# Patient Record
Sex: FEMALE | Race: WHITE | NOT HISPANIC OR LATINO | Employment: OTHER | URBAN - NONMETROPOLITAN AREA
[De-identification: names, ages, dates, MRNs, and addresses within clinical notes are randomized per-mention and may not be internally consistent; named-entity substitution may affect disease eponyms.]

---

## 2017-01-12 ENCOUNTER — OFFICE VISIT (OUTPATIENT)
Dept: CARDIOLOGY | Facility: CLINIC | Age: 77
End: 2017-01-12

## 2017-01-12 VITALS
WEIGHT: 137 LBS | HEIGHT: 66 IN | HEART RATE: 72 BPM | BODY MASS INDEX: 22.02 KG/M2 | SYSTOLIC BLOOD PRESSURE: 130 MMHG | DIASTOLIC BLOOD PRESSURE: 70 MMHG

## 2017-01-12 DIAGNOSIS — I25.10 ATHEROSCLEROSIS OF NATIVE CORONARY ARTERY OF NATIVE HEART WITHOUT ANGINA PECTORIS: ICD-10-CM

## 2017-01-12 DIAGNOSIS — K74.3 PRIMARY BILIARY CIRRHOSIS (HCC): ICD-10-CM

## 2017-01-12 DIAGNOSIS — I73.9 PERIPHERAL VASCULAR DISEASE, UNSPECIFIED (HCC): Primary | ICD-10-CM

## 2017-01-12 DIAGNOSIS — E78.2 MIXED HYPERLIPIDEMIA: ICD-10-CM

## 2017-01-12 DIAGNOSIS — I10 ESSENTIAL HYPERTENSION: ICD-10-CM

## 2017-01-12 PROCEDURE — 99213 OFFICE O/P EST LOW 20 MIN: CPT | Performed by: INTERNAL MEDICINE

## 2017-01-12 PROCEDURE — 93000 ELECTROCARDIOGRAM COMPLETE: CPT | Performed by: INTERNAL MEDICINE

## 2017-01-12 RX ORDER — TRAMADOL HYDROCHLORIDE 50 MG/1
50 TABLET ORAL EVERY 8 HOURS PRN
COMMUNITY

## 2017-01-12 NOTE — MR AVS SNAPSHOT
Rosaura Salgado   1/12/2017 3:00 PM   Office Visit    Dept Phone:  [phone]   Encounter #:  [id number]    Provider:  Eileen Canseco MD   Department:  [facility] CARDIOLOGY                Your Full Care Plan              Today's Medication Changes          These changes are accurate as of: 1/12/17  3:43 PM.  If you have any questions, ask your nurse or doctor.               Stop taking medication(s)listed here:     TYLENOL/CODEINE #4 300-60 MG per tablet   Generic drug:  acetaminophen-codeine   Stopped by:  Eileen Canseco MD                      Your Updated Medication List          This list is accurate as of: 1/12/17  3:43 PM.  Always use your most recent med list.                aspirin 81 MG EC tablet       clopidogrel 75 MG tablet   Commonly known as:  PLAVIX       famotidine 20 MG tablet   Commonly known as:  PEPCID       tiZANidine 4 MG tablet   Commonly known as:  ZANAFLEX       traMADol 50 MG tablet   Commonly known as:  ULTRAM               You Were Diagnosed With        Codes Comments    Peripheral vascular disease, unspecified    -  Primary ICD-10-CM: I73.9  ICD-9-CM: 443.9     Atherosclerosis of native coronary artery of native heart without angina pectoris     ICD-10-CM: I25.10  ICD-9-CM: 414.01     Mixed hyperlipidemia     ICD-10-CM: E78.2  ICD-9-CM: 272.2     Essential hypertension     ICD-10-CM: I10  ICD-9-CM: 401.9     Primary biliary cirrhosis     ICD-10-CM: K74.3  ICD-9-CM: 571.6       Instructions     None    Patient Instructions History      Upcoming Appointments     Visit Type Date Time Department    OFFICE VISIT 1/12/2017  3:00 PM [facility]    OFFICE VISIT 7/13/2017  2:15 PM [facility]     [facility] [employer] allows you to send messages to your doctor, view your test results, renew your prescriptions, schedule appointments, and more. To sign up, go to [employer] and  "click on the Sign Up Now link in the New User? box. Enter your [employer] Activation Code exactly as it appears below along with the last four digits of your Social Security Number and your Date of Birth () to complete the sign-up process. If you do not sign up before the expiration date, you must request a new code.    [employer] Activation Code: [email or web address]  Expires: 2017  3:43 PM    If you have questions, you can email [email or web address] or call [phone] to talk to our [employer] staff. Remember, [employer] is NOT to be used for urgent needs. For medical emergencies, dial 911.               Other Info from Your Visit           Your Appointments     2017  2:15 PM CDT   Office Visit with Eileen Canseco MD   [facility] (--)    [address]  [place] [address] [phone]           Arrive 15 minutes prior to appointment.              Allergies     Penicillins        Vital Signs     Blood Pressure Pulse Height Weight Body Mass Index Smoking Status    130/70 72 66\" (167.6 cm) 137 lb (62.1 kg) 22.11 kg/m2 Current Some Day Smoker      Problems and Diagnoses Noted     Heart disease due to blocked artery    High blood pressure    Mixed hyperlipidemia    Decreased circulation    Primary biliary cholangitis        "

## 2017-01-13 NOTE — PROGRESS NOTES
Rosaura Salgado  76 y.o. female    01/12/2017  1. Peripheral vascular disease, unspecified    2. Atherosclerosis of native coronary artery of native heart without angina pectoris    3. Mixed hyperlipidemia    4. Essential hypertension    5. Primary biliary cirrhosis        History of Present Illness    Mrs. Salgado is here for follow-up of her above stated problems.  From a cardiac standpoint is done well and denied any chest pain, shortness of breath, palpitation or dizziness.  I understand that she was diagnosed to have primary biliary cirrhosis and was recommended education for this by gastroenterology.  No abdominal pain melena or hematemesis was noted.  No signs of congestive heart failure was noted.  Blood pressure was the normal range.  Her peripheral vascular disease is stable.        SUBJECTIVE    Allergies   Allergen Reactions   • Penicillins          Past Medical History   Diagnosis Date   • Coronary atherosclerosis of native coronary artery    • Fibromyalgia    • GERD (gastroesophageal reflux disease)    • Hyperlipidemia    • Hypertension    • Peripheral vascular disease, unspecified    • Pneumonia    • Pulmonary embolus          History reviewed. No pertinent past surgical history.      No family history on file.      Social History     Social History   • Marital status:      Spouse name: N/A   • Number of children: N/A   • Years of education: N/A     Occupational History   • Not on file.     Social History Main Topics   • Smoking status: Current Some Day Smoker   • Smokeless tobacco: Never Used   • Alcohol use No   • Drug use: No   • Sexual activity: Defer     Other Topics Concern   • Not on file     Social History Narrative         Current Outpatient Prescriptions   Medication Sig Dispense Refill   • aspirin 81 MG EC tablet Take 81 mg by mouth Daily.     • clopidogrel (PLAVIX) 75 MG tablet Take 75 mg by mouth Daily.     • famotidine (PEPCID) 20 MG tablet Take 20 mg by mouth 2 (Two) Times a  "Day.     • tiZANidine (ZANAFLEX) 4 MG tablet Take 4 mg by mouth At Night As Needed for muscle spasms.     • traMADol (ULTRAM) 50 MG tablet Take 50 mg by mouth Every 8 (Eight) Hours As Needed for moderate pain (4-6).       No current facility-administered medications for this visit.          OBJECTIVE    Visit Vitals   • /70   • Pulse 72   • Ht 66\" (167.6 cm)   • Wt 137 lb (62.1 kg)   • BMI 22.11 kg/m2           Review of Systems     Constitutional:  Denies recent weight loss, weight gain, fever or chills, no change in exercise tolerance     HENT:  Denies any hearing loss, epistaxis, hoarseness, or difficulty speaking.     Eyes: Wears eyeglasses or contact lenses     Respiratory:  Denies dyspnea with exertion,no cough, wheezing, or hemoptysis.     Cardiovascular: Negative for palpations, chest pain, orthopnea, PND, peripheral edema, syncope, or claudication.     Gastrointestinal:  Denies change in bowel habits, dyspepsia, ulcer disease, hematochezia, or melena.     Endocrine: Negative for cold intolerance, heat intolerance, polydipsia, polyphagia and polyuria. Denies any history of weight change, heat/cold intolerance, polydipsia, polyuria     Genitourinary: Negative.      Musculoskeletal: Denies any history of arthritic symptoms or back problems     Skin:  Denies any change in hair or nails, rashes, or skin lesions.     Allergic/Immunologic: Negative.  Negative for environmental allergies, food allergies and immunocompromised state.     Neurological:  Denies any history of recurrent headaches, strokes, TIA, or seizure disorder.     Hematological: Denies any food allergies, seasonal allergies, bleeding disorders, or lymphadenopathy.     Psychiatric/Behavioral: Denies any history of depression, substance abuse, or change in cognitive function.         Physical Exam     Constitutional: Cooperative, alert and oriented, well-developed, well-nourished, in no acute distress.     HENT:   Head: Normocephalic, normal " hair patterns, no masses or tenderness.  Ears, Nose, and Throat: No gross abnormalities. No pallor or cyanosis. Dentition good.   Eyes: EOMS intact, PERRL, conjunctivae and lids unremarkable. Fundoscopic exam and visual fields not performed.   Neck: No palpable masses or adenopathy, no thyromegaly, no JVD, carotid pulses are full and equal bilaterally and without  Bruits.     Cardiovascular: Regular rhythm, S1 and S2 normal, no S3 or S4. Apical impulse not displaced. No murmurs, gallops, or rubs detected.     Pulmonary/Chest: Chest: normal symmetry, no tenderness to palpation, normal respiratory excursion, no intercostal retraction, no use of accessory muscles.            Pulmonary: Normal breath sounds. No rales or ronchi.    Abdominal: Abdomen soft, bowel sounds normoactive, no masses, no hepatosplenomegaly, non-tender, no bruits.     Musculoskeletal: No deformities, clubbing, cyanosis, erythema, or edema observed. There are no spinal abnormalities noted. Normal muscle strength and tone. Pulses full and equal in all extremities, no bruits auscultated.     Neurological: No gross motor or sensory deficits noted, affect appropriate, oriented to time, person, place.     Skin: Warm and dry to the touch, no apparent skin lesions or masses noted.     Psychiatric: She has a normal mood and affect. Her behavior is normal. Judgment and thought content normal.           ECG 12 Lead  Date/Time: 1/14/2017 11:26 AM  Performed by: VIVIAN LOPEZ  Authorized by: VIVIAN LOPEZ   Comparison: not compared with previous ECG   Rhythm: sinus rhythm  Rate: normal  QRS axis: normal  Comments: EKG showed sinus rhythm heart rate of 74 bpm.  Nonspecific T-wave changes were noted.  QRS duration was 72 ms and QTc interval 450 ms EKG was performed on 1/12/2017              No results found for: WBC, HGB, HCT, MCV, PLT  No results found for: GLUCOSE, BUN, CREATININE, EGFRIFNONA, EGFRIFAFRI, BCR, CO2, CALCIUM,  PROTENTOTREF, ALBUMIN, LABIL2, AST, ALT  No results found for: CHOL  No results found for: TRIG  No results found for: HDL  No results found for: LDLCALC  No results found for: LDL  No results found for: HDLLDLRATIO  No components found for: CHOLHDL  No results found for: HGBA1C  No results found for: TSH, O1KCJFG, F5MFVQM, THYROIDAB        ASSESSMENT AND PLAN  Mrs. Salgado is stable as regards to her heart with no evidence of angina, arrhythmia or congestive heart failure.  Antiplatelet therapy with aspirin and Plavix has been continued.  I've advised her to get in touch with her gastroenterologist since she is not taking medications for primary biliary cirrhosis.    Diagnoses and all orders for this visit:    Peripheral vascular disease, unspecified    Atherosclerosis of native coronary artery of native heart without angina pectoris    Mixed hyperlipidemia    Essential hypertension    Primary biliary cirrhosis        Eileen Canseco MD  1/13/2017  2:23 PM

## 2017-09-21 ENCOUNTER — OFFICE VISIT (OUTPATIENT)
Dept: CARDIOLOGY | Facility: CLINIC | Age: 77
End: 2017-09-21

## 2017-09-21 VITALS
HEART RATE: 70 BPM | HEIGHT: 66 IN | DIASTOLIC BLOOD PRESSURE: 70 MMHG | SYSTOLIC BLOOD PRESSURE: 126 MMHG | WEIGHT: 126 LBS | BODY MASS INDEX: 20.25 KG/M2

## 2017-09-21 DIAGNOSIS — I73.9 PERIPHERAL VASCULAR DISEASE, UNSPECIFIED (HCC): Primary | ICD-10-CM

## 2017-09-21 DIAGNOSIS — I10 ESSENTIAL HYPERTENSION: ICD-10-CM

## 2017-09-21 DIAGNOSIS — E78.2 MIXED HYPERLIPIDEMIA: ICD-10-CM

## 2017-09-21 DIAGNOSIS — I73.9 PERIPHERAL VASCULAR DISEASE, UNSPECIFIED (HCC): ICD-10-CM

## 2017-09-21 DIAGNOSIS — I25.10 ATHEROSCLEROSIS OF NATIVE CORONARY ARTERY OF NATIVE HEART WITHOUT ANGINA PECTORIS: Primary | ICD-10-CM

## 2017-09-21 PROCEDURE — 99213 OFFICE O/P EST LOW 20 MIN: CPT | Performed by: INTERNAL MEDICINE

## 2017-09-21 RX ORDER — URSODIOL 300 MG/1
300 CAPSULE ORAL DAILY
COMMUNITY
End: 2019-01-28

## 2017-09-21 NOTE — PROGRESS NOTES
Rosaura Salgado  76 y.o. female    09/21/2017  1. Atherosclerosis of native coronary artery of native heart without angina pectoris    2. Essential hypertension    3. Mixed hyperlipidemia    4. Peripheral vascular disease, unspecified        History of Present Illness    Ms. Salgado is here for follow-up of her above stated problems.  She denied any chest pain, shortness of breath, palpitation, dizziness or syncope.  She's been compliant with her medications.  Blood pressure was in the normal range.  She follows up with Dr. Kerr on a regular basis.  Note that she has not followed up with vascular surgery and I believe that she would benefit from this.  Arrangements will be made.        SUBJECTIVE    Allergies   Allergen Reactions   • Penicillins          Past Medical History:   Diagnosis Date   • Coronary atherosclerosis of native coronary artery    • Fibromyalgia    • GERD (gastroesophageal reflux disease)    • Hyperlipidemia    • Hypertension    • Peripheral vascular disease, unspecified    • Pneumonia    • Pulmonary embolus          History reviewed. No pertinent surgical history.      Family History   Problem Relation Age of Onset   • No Known Problems Mother    • No Known Problems Father          Social History     Social History   • Marital status:      Spouse name: N/A   • Number of children: N/A   • Years of education: N/A     Occupational History   • Not on file.     Social History Main Topics   • Smoking status: Current Some Day Smoker   • Smokeless tobacco: Never Used   • Alcohol use No   • Drug use: No   • Sexual activity: Defer     Other Topics Concern   • Not on file     Social History Narrative         Current Outpatient Prescriptions   Medication Sig Dispense Refill   • aspirin 81 MG EC tablet Take 81 mg by mouth Daily.     • clopidogrel (PLAVIX) 75 MG tablet Take 75 mg by mouth Daily.     • famotidine (PEPCID) 20 MG tablet Take 20 mg by mouth 2 (Two) Times a Day.     • tiZANidine  "(ZANAFLEX) 4 MG tablet Take 4 mg by mouth At Night As Needed for muscle spasms.     • traMADol (ULTRAM) 50 MG tablet Take 50 mg by mouth Every 8 (Eight) Hours As Needed for moderate pain (4-6).     • ursodiol (ACTIGALL) 300 MG capsule Take 300 mg by mouth Daily.       No current facility-administered medications for this visit.          OBJECTIVE    /70  Pulse 70  Ht 66\" (167.6 cm)  Wt 126 lb (57.2 kg)  BMI 20.34 kg/m2        Review of Systems     Constitutional:  Denies recent weight loss, weight gain, fever or chills     HENT:  Denies any hearing loss, epistaxis, hoarseness, or difficulty speaking.     Eyes: Wears eyeglasses or contact lenses     Respiratory:  Denies dyspnea with exertion,no cough, wheezing, or hemoptysis.     Cardiovascular: Negative for palpations, chest pain, orthopnea, PND    Gastrointestinal:  Denies change in bowel habits, dyspepsia, ulcer disease, hematochezia, or melena.     Endocrine: Negative for cold intolerance, heat intolerance, polydipsia, polyphagia and polyuria.    Genitourinary: Negative.      Musculoskeletal: Denies any history of arthritic symptoms or back problems     Skin:  Denies any change in hair or nails, rashes, or skin lesions.     Physical Exam     Constitutional: Cooperative, alert and oriented,  in no acute distress.     HENT:   Head: Normocephalic, normal hair patterns, no masses or tenderness.  Ears, Nose, and Throat: No gross abnormalities. No pallor or cyanosis.   Eyes: EOMS intact, PERRL, conjunctivae and lids unremarkable. Fundoscopic exam and visual fields not performed.   Neck: No palpable masses or adenopathy, no thyromegaly, no JVD, bilateral carotid endarterectomy scar.  Faint bruit noted on both sides.  Cardiovascular: Regular rhythm, S1 and S2 normal, no S3 or S4.No murmurs, gallops, or rubs detected.     Pulmonary/Chest: Chest: normal symmetry, no tenderness to palpation, normal respiratory excursion, no intercostal retraction, no use of " accessory muscles.            Pulmonary: Normal breath sounds. No rales or ronchi.    Abdominal: Abdomen soft, bowel sounds normoactive, no masses, no hepatosplenomegaly, non-tender, no bruits.     Musculoskeletal: No deformities, clubbing, cyanosis, erythema, or edema observed.     Neurological: No gross motor or sensory deficits noted, affect appropriate, oriented to time, person, place.     Skin: Warm and dry to the touch, no apparent skin lesions or masses noted.     Psychiatric: She has a normal mood and affect. Her behavior is normal. Judgment and thought content normal.         Procedures      No results found for: WBC, HGB, HCT, MCV, PLT  No results found for: GLUCOSE, BUN, CREATININE, EGFRIFNONA, EGFRIFAFRI, BCR, CO2, CALCIUM, PROTENTOTREF, ALBUMIN, LABIL2, AST, ALT  No results found for: CHOL  No results found for: TRIG  No results found for: HDL  No results found for: LDLCALC  No results found for: LDL  No results found for: HDLLDLRATIO  No components found for: CHOLHDL  No results found for: HGBA1C  No results found for: TSH, E0NIBVN, V7DDHAP, THYROIDAB        ASSESSMENT AND PLAN    Ms. Salgado is stable from the cardiac standpoint with no evidence of angina, arrhythmia or congestive heart failure.  Her present antiplatelet therapy with aspirin and Plavix, has been continued.  Last cardiac catheterization showed no significant coronary artery disease (2010).  Diagnoses and all orders for this visit:    Atherosclerosis of native coronary artery of native heart without angina pectoris    Essential hypertension    Mixed hyperlipidemia    Peripheral vascular disease, unspecified        Eileen Canseco MD  9/21/2017  2:11 PM

## 2017-12-14 ENCOUNTER — OFFICE VISIT (OUTPATIENT)
Dept: CARDIAC SURGERY | Facility: CLINIC | Age: 77
End: 2017-12-14

## 2017-12-14 VITALS
HEIGHT: 66 IN | BODY MASS INDEX: 19.77 KG/M2 | SYSTOLIC BLOOD PRESSURE: 122 MMHG | OXYGEN SATURATION: 98 % | WEIGHT: 123 LBS | TEMPERATURE: 97.6 F | HEART RATE: 87 BPM | DIASTOLIC BLOOD PRESSURE: 68 MMHG

## 2017-12-14 DIAGNOSIS — I65.23 CAROTID STENOSIS, ASYMPTOMATIC, BILATERAL: ICD-10-CM

## 2017-12-14 DIAGNOSIS — I25.10 ATHEROSCLEROSIS OF NATIVE CORONARY ARTERY OF NATIVE HEART WITHOUT ANGINA PECTORIS: ICD-10-CM

## 2017-12-14 DIAGNOSIS — I70.219 ATHEROSCLEROSIS OF ARTERY OF EXTREMITY WITH INTERMITTENT CLAUDICATION (HCC): Primary | ICD-10-CM

## 2017-12-14 DIAGNOSIS — E78.2 MIXED HYPERLIPIDEMIA: ICD-10-CM

## 2017-12-14 DIAGNOSIS — M79.7 FIBROMYALGIA: ICD-10-CM

## 2017-12-14 DIAGNOSIS — I10 ESSENTIAL HYPERTENSION: ICD-10-CM

## 2017-12-14 PROCEDURE — 99204 OFFICE O/P NEW MOD 45 MIN: CPT | Performed by: THORACIC SURGERY (CARDIOTHORACIC VASCULAR SURGERY)

## 2017-12-14 RX ORDER — HYDROCODONE BITARTRATE AND ACETAMINOPHEN 5; 325 MG/1; MG/1
1 TABLET ORAL EVERY 6 HOURS PRN
COMMUNITY
End: 2023-03-31 | Stop reason: HOSPADM

## 2017-12-16 LAB
BH CV XLRA MEAS LEFT DIST CCA EDV: 15 CM/SEC
BH CV XLRA MEAS LEFT DIST CCA PSV: 74 CM/SEC
BH CV XLRA MEAS LEFT DIST ICA EDV: 17 CM/SEC
BH CV XLRA MEAS LEFT DIST ICA PSV: 110 CM/SEC
BH CV XLRA MEAS LEFT MID ICA EDV: 28 CM/SEC
BH CV XLRA MEAS LEFT MID ICA PSV: 118 CM/SEC
BH CV XLRA MEAS LEFT PROX CCA EDV: 20 CM/SEC
BH CV XLRA MEAS LEFT PROX CCA PSV: 186 CM/SEC
BH CV XLRA MEAS LEFT PROX ECA EDV: 10 CM/SEC
BH CV XLRA MEAS LEFT PROX ECA PSV: 119 CM/SEC
BH CV XLRA MEAS LEFT PROX ICA EDV: 20 CM/SEC
BH CV XLRA MEAS LEFT PROX ICA PSV: 84 CM/SEC
BH CV XLRA MEAS LEFT VERTEBRAL A EDV: 13 CM/SEC
BH CV XLRA MEAS LEFT VERTEBRAL A PSV: 83 CM/SEC
BH CV XLRA MEAS RIGHT DIST CCA EDV: 9 CM/SEC
BH CV XLRA MEAS RIGHT DIST CCA PSV: 69 CM/SEC
BH CV XLRA MEAS RIGHT DIST ICA EDV: 25 CM/SEC
BH CV XLRA MEAS RIGHT DIST ICA PSV: 125 CM/SEC
BH CV XLRA MEAS RIGHT MID ICA EDV: 21 CM/SEC
BH CV XLRA MEAS RIGHT MID ICA PSV: 105 CM/SEC
BH CV XLRA MEAS RIGHT PROX CCA EDV: 15 CM/SEC
BH CV XLRA MEAS RIGHT PROX CCA PSV: 139 CM/SEC
BH CV XLRA MEAS RIGHT PROX ECA EDV: 8 CM/SEC
BH CV XLRA MEAS RIGHT PROX ECA PSV: 181 CM/SEC
BH CV XLRA MEAS RIGHT PROX ICA EDV: 11 CM/SEC
BH CV XLRA MEAS RIGHT PROX ICA PSV: 66 CM/SEC
BH CV XLRA MEAS RIGHT VERTEBRAL A EDV: 6 CM/SEC
BH CV XLRA MEAS RIGHT VERTEBRAL A PSV: 46 CM/SEC

## 2017-12-26 PROBLEM — I65.23 CAROTID STENOSIS, ASYMPTOMATIC, BILATERAL: Status: ACTIVE | Noted: 2017-12-26

## 2017-12-26 PROBLEM — I70.219 ATHEROSCLEROSIS OF ARTERY OF EXTREMITY WITH INTERMITTENT CLAUDICATION: Status: ACTIVE | Noted: 2017-12-26

## 2017-12-26 NOTE — PROGRESS NOTES
12/14/2017    Rosaura Salgado  1940    Chief Complaint:  LEFT leg pain    HPI:      PCP:  Vitaliy Kerr MD  Cardiology:  Dr Canseco  Orthopedic Surgery:  Dr Shea  Pain Management:  Dr Ellis    77 y.o. female with CAD(stable, increased risk cardiovascular events), hyperlipidemia(stable, increased risk cardiovascular events), HTN(stable, increased risk stroke), carotid stenosis(stable, increased risk stroke), PVD(new, increased risk cardiovascular events).  smokes 1 PPD.  Moderate LEFT leg pain, weakness x >6months.  MRI back scheduled 12/15/2017.  No swelling.  No TIA stroke amaurosis.  No MI claudication. No other associated signs, symptoms or modifying factors.    11/2015 Echocardiogram:  EF 60%, LV 37mm  11/2015 Carotid Duplex([facility]):  TARI 0-49% antegrade, ECA >70% (229cm/s).  LICA 0-49% antegrade vert.  12/2015 ROSE MARY([facility]):  RIGHT 1.0 LEFT 1.1.  Doppler and VPR waveforms normal.  1/2017 ECG:  NSR 74, QTc 450  12/14/2017 Carotid Duplex:  TARI 0-49% antegrade.  LICA 0-49% antegrade.    The following portions of the patient's history were reviewed and updated as appropriate: allergies, current medications, past family history, past medical history, past social history, past surgical history and problem list.  Recent images independently reviewed.  Available laboratory values reviewed.    PMH:  Past Medical History:   Diagnosis Date   • Allergic rhinitis    • Coronary atherosclerosis of native coronary artery    • Depression    • Fibromyalgia    • GERD (gastroesophageal reflux disease)    • Hyperlipidemia    • Hypertension    • On anticoagulant therapy    • Peptic ulcer    • Peripheral vascular disease, unspecified    • Pneumonia    • Pulmonary embolus        ALLERGIES:  Allergies   Allergen Reactions   • Penicillins          MEDICATIONS:    Current Outpatient Prescriptions:   •  aspirin 81 MG EC tablet, Take 81 mg by mouth Daily., Disp: , Rfl:   •  HYDROcodone-acetaminophen (NORCO) 5-325 MG per tablet, Take  1 tablet by mouth Every 6 (Six) Hours As Needed., Disp: , Rfl:   •  tiZANidine (ZANAFLEX) 4 MG tablet, Take 4 mg by mouth At Night As Needed for muscle spasms., Disp: , Rfl:   •  traMADol (ULTRAM) 50 MG tablet, Take 50 mg by mouth Every 8 (Eight) Hours As Needed for moderate pain (4-6)., Disp: , Rfl:   •  ursodiol (ACTIGALL) 300 MG capsule, Take 300 mg by mouth Daily., Disp: , Rfl:     Review of Systems   Review of Systems   Constitution: Positive for malaise/fatigue and weight loss. Negative for night sweats.   HENT: Negative for hearing loss, hoarse voice and stridor.    Eyes: Positive for blurred vision. Negative for vision loss in left eye, vision loss in right eye and visual disturbance.   Cardiovascular: Negative for chest pain, leg swelling and palpitations.   Respiratory: Positive for sleep disturbances due to breathing. Negative for cough, hemoptysis and shortness of breath.    Hematologic/Lymphatic: Negative for adenopathy and bleeding problem. Does not bruise/bleed easily.   Skin: Negative for color change, poor wound healing and rash.   Musculoskeletal: Positive for arthritis, back pain, muscle cramps, muscle weakness, myalgias and neck pain.   Gastrointestinal: Negative for abdominal pain, dysphagia and heartburn.   Neurological: Negative for dizziness, numbness and seizures.   Psychiatric/Behavioral: Negative for altered mental status, depression and memory loss. The patient is not nervous/anxious.        Physical Exam   Physical Exam   Constitutional: She is oriented to person, place, and time. She is active and cooperative. She does not appear ill. No distress.   HENT:   Head: Atraumatic.   Right Ear: Hearing normal.   Left Ear: Hearing normal.   Nose: No nasal deformity. No epistaxis.   Mouth/Throat: She does not have dentures. Normal dentition.   Eyes: Conjunctivae and lids are normal. Right pupil is round and reactive. Left pupil is round and reactive.   Neck: No JVD present. Carotid bruit is not  present. No tracheal deviation present. No thyroid mass and no thyromegaly present.   Cardiovascular: Normal rate and regular rhythm.    No murmur heard.  Pulses:       Carotid pulses are 2+ on the right side with bruit, and 2+ on the left side.       Radial pulses are 2+ on the right side, and 2+ on the left side.        Dorsalis pedis pulses are 2+ on the right side, and 2+ on the left side.        Posterior tibial pulses are 2+ on the right side, and 2+ on the left side.   Triphasic doppler DP PT   Pulmonary/Chest: Effort normal and breath sounds normal.   Abdominal: Soft. She exhibits no distension and no mass. There is no splenomegaly or hepatomegaly. There is no tenderness.   Musculoskeletal: She exhibits no deformity.   Gait normal.    Lymphadenopathy:     She has no cervical adenopathy.        Right: No supraclavicular adenopathy present.        Left: No supraclavicular adenopathy present.   Neurological: She is alert and oriented to person, place, and time. She has normal strength.   Skin: Skin is warm and dry. No cyanosis or erythema. No pallor.   No venous staining   Psychiatric: She has a normal mood and affect. Her speech is normal. Judgment and thought content normal.       ASSESSMENT:  Rosaura was seen today for peripheral vascular disease.    Diagnoses and all orders for this visit:    Atherosclerosis of artery of extremity with intermittent claudication    Carotid stenosis, asymptomatic, bilateral  -     Duplex Carotid Ultrasound CAR    Atherosclerosis of native coronary artery of native heart without angina pectoris    Essential hypertension    Mixed hyperlipidemia    Fibromyalgia    PLAN:  Detailed discussion with Rosaura Salgado regarding situation and options.  Normal arterial perfusion bilateral lower extremities.  LEFT lower extremity symptoms likely due to neuropathy, radiculopathy.  Multiple risk factors with severe comorbidities.  No intervention indicated at this time.  Will follow with  interval imaging.  Risks, benefits discussed.  Understands and wishes to proceed with plan.    Carotid duplex today    Return as needed.  Recommended regular physical activity, progressive walking program.  Continue current medications as directed.  Will Obtain relevant old records.    Thank you for the opportunity to participate in this patient's care.    Copy to primary care provider.    EMR Dragon/Transcription disclaimer:   Much of this encounter note is an electronic transcription/translation of spoken language to printed text. The electronic translation of spoken language may permit erroneous, or at times, nonsensical words or phrases to be inadvertently transcribed; Although I have reviewed the note for such errors, some may still exist.

## 2018-12-13 ENCOUNTER — HOSPITAL ENCOUNTER (EMERGENCY)
Facility: HOSPITAL | Age: 78
Discharge: HOME | End: 2018-12-13
Attending: FAMILY MEDICINE | Admitting: FAMILY MEDICINE

## 2018-12-13 ENCOUNTER — APPOINTMENT (OUTPATIENT)
Dept: CT IMAGING | Facility: HOSPITAL | Age: 78
End: 2018-12-13

## 2018-12-13 VITALS
HEART RATE: 84 BPM | WEIGHT: 111.5 LBS | OXYGEN SATURATION: 99 % | HEIGHT: 63 IN | TEMPERATURE: 99 F | DIASTOLIC BLOOD PRESSURE: 63 MMHG | BODY MASS INDEX: 19.76 KG/M2 | RESPIRATION RATE: 20 BRPM | SYSTOLIC BLOOD PRESSURE: 138 MMHG

## 2018-12-13 DIAGNOSIS — K80.21 CALCULUS OF GALLBLADDER WITH BILIARY OBSTRUCTION BUT WITHOUT CHOLECYSTITIS: ICD-10-CM

## 2018-12-13 DIAGNOSIS — K62.5 RECTAL BLEEDING: Primary | ICD-10-CM

## 2018-12-13 LAB
ALBUMIN SERPL-MCNC: 3.7 G/DL (ref 3.4–4.8)
ALBUMIN/GLOB SERPL: 0.9 G/DL (ref 1.1–1.8)
ALP SERPL-CCNC: 1062 U/L (ref 38–126)
ALT SERPL W P-5'-P-CCNC: 59 U/L (ref 9–52)
ANION GAP SERPL CALCULATED.3IONS-SCNC: 9 MMOL/L (ref 5–15)
APTT PPP: 29.7 SECONDS (ref 20–40.3)
AST SERPL-CCNC: 81 U/L (ref 14–36)
BASOPHILS # BLD AUTO: 0.02 10*3/MM3 (ref 0–0.2)
BASOPHILS NFR BLD AUTO: 0.2 % (ref 0–2)
BILIRUB SERPL-MCNC: 2.9 MG/DL (ref 0.2–1.3)
BILIRUB UR QL STRIP: ABNORMAL
BUN BLD-MCNC: 21 MG/DL (ref 7–21)
BUN/CREAT SERPL: 32.3 (ref 7–25)
CALCIUM SPEC-SCNC: 8.6 MG/DL (ref 8.4–10.2)
CHLORIDE SERPL-SCNC: 103 MMOL/L (ref 95–110)
CK SERPL-CCNC: 62 U/L (ref 30–135)
CLARITY UR: ABNORMAL
CO2 SERPL-SCNC: 25 MMOL/L (ref 22–31)
COLOR UR: ABNORMAL
CREAT BLD-MCNC: 0.65 MG/DL (ref 0.5–1)
CRP SERPL-MCNC: 3.1 MG/DL (ref 0–1)
D-LACTATE SERPL-SCNC: 1.8 MMOL/L (ref 0.5–2)
DEPRECATED RDW RBC AUTO: 55 FL (ref 36.4–46.3)
EOSINOPHIL # BLD AUTO: 0.18 10*3/MM3 (ref 0–0.7)
EOSINOPHIL NFR BLD AUTO: 2 % (ref 0–7)
ERYTHROCYTE [DISTWIDTH] IN BLOOD BY AUTOMATED COUNT: 16 % (ref 11.5–14.5)
GFR SERPL CREATININE-BSD FRML MDRD: 88 ML/MIN/1.73 (ref 39–90)
GLOBULIN UR ELPH-MCNC: 4.2 GM/DL (ref 2.3–3.5)
GLUCOSE BLD-MCNC: 103 MG/DL (ref 60–100)
GLUCOSE UR STRIP-MCNC: NEGATIVE MG/DL
HCT VFR BLD AUTO: 34.4 % (ref 35–45)
HGB BLD-MCNC: 11.7 G/DL (ref 12–15.5)
HGB UR QL STRIP.AUTO: NEGATIVE
HOLD SPECIMEN: NORMAL
IMM GRANULOCYTES # BLD: 0.03 10*3/MM3 (ref 0–0.02)
IMM GRANULOCYTES NFR BLD: 0.3 % (ref 0–0.5)
INR PPP: 0.99 (ref 0.8–1.2)
KETONES UR QL STRIP: NEGATIVE
LEUKOCYTE ESTERASE UR QL STRIP.AUTO: NEGATIVE
LIPASE SERPL-CCNC: 52 U/L (ref 23–300)
LYMPHOCYTES # BLD AUTO: 2.32 10*3/MM3 (ref 0.6–4.2)
LYMPHOCYTES NFR BLD AUTO: 25.1 % (ref 10–50)
MCH RBC QN AUTO: 31.9 PG (ref 26.5–34)
MCHC RBC AUTO-ENTMCNC: 34 G/DL (ref 31.4–36)
MCV RBC AUTO: 93.7 FL (ref 80–98)
MONOCYTES # BLD AUTO: 0.4 10*3/MM3 (ref 0–0.9)
MONOCYTES NFR BLD AUTO: 4.3 % (ref 0–12)
NEUTROPHILS # BLD AUTO: 6.28 10*3/MM3 (ref 2–8.6)
NEUTROPHILS NFR BLD AUTO: 68.1 % (ref 37–80)
NITRITE UR QL STRIP: NEGATIVE
PH UR STRIP.AUTO: 6 [PH] (ref 5–9)
PLATELET # BLD AUTO: 156 10*3/MM3 (ref 150–450)
PMV BLD AUTO: 10.2 FL (ref 8–12)
POTASSIUM BLD-SCNC: 3.5 MMOL/L (ref 3.5–5.1)
PROT SERPL-MCNC: 7.9 G/DL (ref 6.3–8.6)
PROT UR QL STRIP: NEGATIVE
PROTHROMBIN TIME: 12.9 SECONDS (ref 11.1–15.3)
RBC # BLD AUTO: 3.67 10*6/MM3 (ref 3.77–5.16)
SODIUM BLD-SCNC: 137 MMOL/L (ref 137–145)
SP GR UR STRIP: 1.02 (ref 1–1.03)
TROPONIN I SERPL-MCNC: <0.012 NG/ML
UROBILINOGEN UR QL STRIP: ABNORMAL
WBC NRBC COR # BLD: 9.23 10*3/MM3 (ref 3.2–9.8)
WHOLE BLOOD HOLD SPECIMEN: NORMAL

## 2018-12-13 PROCEDURE — 87040 BLOOD CULTURE FOR BACTERIA: CPT | Performed by: PHYSICIAN ASSISTANT

## 2018-12-13 PROCEDURE — 96366 THER/PROPH/DIAG IV INF ADDON: CPT

## 2018-12-13 PROCEDURE — 82550 ASSAY OF CK (CPK): CPT | Performed by: PHYSICIAN ASSISTANT

## 2018-12-13 PROCEDURE — 83605 ASSAY OF LACTIC ACID: CPT | Performed by: PHYSICIAN ASSISTANT

## 2018-12-13 PROCEDURE — 99284 EMERGENCY DEPT VISIT MOD MDM: CPT

## 2018-12-13 PROCEDURE — 25010000002 IOPAMIDOL 61 % SOLUTION: Performed by: FAMILY MEDICINE

## 2018-12-13 PROCEDURE — 80053 COMPREHEN METABOLIC PANEL: CPT | Performed by: PHYSICIAN ASSISTANT

## 2018-12-13 PROCEDURE — 36415 COLL VENOUS BLD VENIPUNCTURE: CPT | Performed by: PHYSICIAN ASSISTANT

## 2018-12-13 PROCEDURE — 85610 PROTHROMBIN TIME: CPT | Performed by: PHYSICIAN ASSISTANT

## 2018-12-13 PROCEDURE — 74177 CT ABD & PELVIS W/CONTRAST: CPT

## 2018-12-13 PROCEDURE — 96361 HYDRATE IV INFUSION ADD-ON: CPT

## 2018-12-13 PROCEDURE — 84484 ASSAY OF TROPONIN QUANT: CPT | Performed by: PHYSICIAN ASSISTANT

## 2018-12-13 PROCEDURE — 81003 URINALYSIS AUTO W/O SCOPE: CPT | Performed by: PHYSICIAN ASSISTANT

## 2018-12-13 PROCEDURE — 85025 COMPLETE CBC W/AUTO DIFF WBC: CPT | Performed by: PHYSICIAN ASSISTANT

## 2018-12-13 PROCEDURE — 93010 ELECTROCARDIOGRAM REPORT: CPT | Performed by: INTERNAL MEDICINE

## 2018-12-13 PROCEDURE — 85730 THROMBOPLASTIN TIME PARTIAL: CPT | Performed by: PHYSICIAN ASSISTANT

## 2018-12-13 PROCEDURE — 93005 ELECTROCARDIOGRAM TRACING: CPT | Performed by: PHYSICIAN ASSISTANT

## 2018-12-13 PROCEDURE — 96376 TX/PRO/DX INJ SAME DRUG ADON: CPT

## 2018-12-13 PROCEDURE — 83690 ASSAY OF LIPASE: CPT | Performed by: PHYSICIAN ASSISTANT

## 2018-12-13 PROCEDURE — 96365 THER/PROPH/DIAG IV INF INIT: CPT

## 2018-12-13 PROCEDURE — 86140 C-REACTIVE PROTEIN: CPT | Performed by: PHYSICIAN ASSISTANT

## 2018-12-13 RX ORDER — MAGNESIUM GLUCONATE 30 MG(550)
595 TABLET ORAL DAILY
COMMUNITY

## 2018-12-13 RX ORDER — SODIUM CHLORIDE 0.9 % (FLUSH) 0.9 %
10 SYRINGE (ML) INJECTION AS NEEDED
Status: DISCONTINUED | OUTPATIENT
Start: 2018-12-13 | End: 2018-12-14 | Stop reason: HOSPADM

## 2018-12-13 RX ORDER — PANTOPRAZOLE SODIUM 40 MG/10ML
80 INJECTION, POWDER, LYOPHILIZED, FOR SOLUTION INTRAVENOUS ONCE
Status: COMPLETED | OUTPATIENT
Start: 2018-12-13 | End: 2018-12-13

## 2018-12-13 RX ORDER — PANTOPRAZOLE SODIUM 40 MG/1
40 TABLET, DELAYED RELEASE ORAL DAILY
Qty: 30 TABLET | Refills: 0 | Status: SHIPPED | OUTPATIENT
Start: 2018-12-13

## 2018-12-13 RX ADMIN — IOPAMIDOL 70 ML: 612 INJECTION, SOLUTION INTRAVENOUS at 21:49

## 2018-12-13 RX ADMIN — SODIUM CHLORIDE 8 MG/HR: 900 INJECTION INTRAVENOUS at 19:33

## 2018-12-13 RX ADMIN — PANTOPRAZOLE SODIUM 80 MG: 40 INJECTION, POWDER, FOR SOLUTION INTRAVENOUS at 18:58

## 2018-12-13 RX ADMIN — SODIUM CHLORIDE 1000 ML: 9 INJECTION, SOLUTION INTRAVENOUS at 18:58

## 2018-12-14 NOTE — ED NOTES
Ok to discharge pt even though protonix IVPB was not done infusing, per Yamileth Smith, RN  12/13/18 [id number]       Yamileth Joseph, RN  12/13/18 1606

## 2018-12-14 NOTE — ED PROVIDER NOTES
Subjective   78-year-old female presents to ER with complaints of blacks stool.  Patient minute the symptoms started yesterday.  Patient denies being on any type of iron supplementation.  Patient is denying any abdominal pain.  Patient informed primary care provider of the black stools.  Primary care provider performed an occult stool on her which was positive.  Patient does have positive history of a peptic ulcer disease.  Patient also has positive history of cirrhosis.  Patient denies any nausea or vomiting.            Review of Systems   Constitutional: Negative.  Negative for fever.   HENT: Negative.    Respiratory: Negative.    Cardiovascular: Negative.  Negative for chest pain.   Gastrointestinal: Positive for blood in stool. Negative for abdominal pain.   Endocrine: Negative.    Genitourinary: Negative.  Negative for dysuria.   Skin: Negative.    Neurological: Negative.    Psychiatric/Behavioral: Negative.    All other systems reviewed and are negative.      Past Medical History:   Diagnosis Date   • Allergic rhinitis    • Coronary atherosclerosis of native coronary artery    • Depression    • Fibromyalgia    • GERD (gastroesophageal reflux disease)    • Hyperlipidemia    • Hypertension    • On anticoagulant therapy    • Peptic ulcer    • Peripheral vascular disease, unspecified (CMS/HCC)    • Pneumonia    • Pulmonary embolus (CMS/HCC)        Allergies   Allergen Reactions   • Penicillins        Past Surgical History:   Procedure Laterality Date   • KNEE SURGERY         Family History   Problem Relation Age of Onset   • COPD Mother    • Other Father         Dad  from gunshot wound.   • Cancer Other    • Thyroid disease Other        Social History     Socioeconomic History   • Marital status:      Spouse name: Not on file   • Number of children: Not on file   • Years of education: Not on file   • Highest education level: Not on file   Tobacco Use   • Smoking status: Former Smoker   • Smokeless  "tobacco: Never Used   Substance and Sexual Activity   • Alcohol use: No   • Drug use: No   • Sexual activity: Defer       /82   Pulse 74   Temp 99 °F (37.2 °C) (Oral)   Resp 18   Ht 160 cm (63\")   Wt 50.6 kg (111 lb 8 oz)   SpO2 99%   BMI 19.75 kg/m²     Objective   Physical Exam   Constitutional: She is oriented to person, place, and time. She appears well-developed and well-nourished. No distress.   HENT:   Head: Normocephalic and atraumatic.   Right Ear: External ear normal.   Left Ear: External ear normal.   Nose: Nose normal.   Eyes: Conjunctivae and EOM are normal. Pupils are equal, round, and reactive to light. Scleral icterus is present.   Neck: Normal range of motion. Neck supple. No JVD present. No tracheal deviation present.   Cardiovascular: Normal rate, regular rhythm and normal heart sounds.   No murmur heard.  Pulmonary/Chest: Effort normal and breath sounds normal. No respiratory distress. She has no wheezes.   Abdominal: Soft. Bowel sounds are normal. There is no tenderness.   Musculoskeletal: Normal range of motion. She exhibits no edema or deformity.   Neurological: She is alert and oriented to person, place, and time. No cranial nerve deficit.   Skin: Skin is warm and dry. No rash noted. She is not diaphoretic. No erythema. No pallor.   Psychiatric: She has a normal mood and affect. Her behavior is normal. Thought content normal.   Nursing note and vitals reviewed.      Procedures           ED Course  ED Course as of Dec 13 2305   Thu Dec 13, 2018   2108 EKG interpreted by Dr. Ho: Normal sinus rhythm.  [RB]      ED Course User Index  [RB] Yasmany Magallanes PA      Labs Reviewed   COMPREHENSIVE METABOLIC PANEL - Abnormal; Notable for the following components:       Result Value    Glucose 103 (*)     ALT (SGPT) 59 (*)     AST (SGOT) 81 (*)     Alkaline Phosphatase 1,062 (*)     Total Bilirubin 2.9 (*)     Globulin 4.2 (*)     A/G Ratio 0.9 (*)     BUN/Creatinine Ratio 32.3 (*)     All " other components within normal limits    Narrative:     The MDRD GFR formula is only valid for adults with stable renal function between ages 18 and 70.   URINALYSIS W/ CULTURE IF INDICATED - Abnormal; Notable for the following components:    Appearance, UA Cloudy (*)     Bilirubin, UA Small (1+) (*)     All other components within normal limits    Narrative:     Urine microscopic not indicated.   C-REACTIVE PROTEIN - Abnormal; Notable for the following components:    C-Reactive Protein 3.10 (*)     All other components within normal limits   CBC WITH AUTO DIFFERENTIAL - Abnormal; Notable for the following components:    RBC 3.67 (*)     Hemoglobin 11.7 (*)     Hematocrit 34.4 (*)     RDW 16.0 (*)     RDW-SD 55.0 (*)     Immature Grans, Absolute 0.03 (*)     All other components within normal limits   LIPASE - Normal   LACTIC ACID, PLASMA - Normal   CK - Normal   TROPONIN (IN-HOUSE) - Normal   PROTIME-INR - Normal    Narrative:     Therapeutic range for most indications is 2.0-3.0 INR,  or 2.5-3.5 for mechanical heart valves.   APTT - Normal    Narrative:     The recommended Heparin therapeutic range is 68-97 seconds.   BLOOD CULTURE   BLOOD CULTURE   RAINBOW DRAW    Narrative:     The following orders were created for panel order [place] Draw.  Procedure                               Abnormality         Status                     ---------                               -----------         ------                     Light Blue Top[[id number]]                                   Final result               Green Top (Gel)[[id number]]                                  Final result               Lavender Top[[id number]]                                     Final result               Gold Top - SST[[id number]]                                   Final result                 Please view results for these tests on the individual orders.   POCT OCCULT BLOOD STOOL   CBC AND DIFFERENTIAL    Narrative:     The following orders were  created for panel order CBC & Differential.  Procedure                               Abnormality         Status                     ---------                               -----------         ------                     CBC Auto Differential[[id number]]        Abnormal            Final result                 Please view results for these tests on the individual orders.   LIGHT BLUE TOP   GREEN TOP   LAVENDER TOP   GOLD TOP - SST   EXTRA TUBES    Narrative:     The following orders were created for panel order Extra Tubes.  Procedure                               Abnormality         Status                     ---------                               -----------         ------                     Light Blue Top[[id number]]                                   Final result               Lavender Top[[id number]]                                     Final result               Gold Top - SST[[id number]]                                   Final result                 Please view results for these tests on the individual orders.   LIGHT BLUE TOP   LAVENDER TOP   GOLD TOP - SST       CT Abdomen Pelvis With Contrast   Final Result   1. Splenomegaly.   2. Mild scarring versus atelectasis in left lower lobe.   3. Small hiatal hernia.   4. Small hepatic cyst.   5. Cholelithiasis.   6. Scattered diverticula in the colon without inflammation or   acute abnormalities.   7. Degenerative changes in lumbar spine with multiple compression   deformities involving T12, L1, L2, and L4 of uncertain age.         Electronically signed by:  Guanaco Wetzel MD  12/13/2018 10:06 PM   CST Workstation: DOT      spoke to patient to follow up with Dr. Carmona in 1 to 2 days. Come back if worse.            MDM      Final diagnoses:   Rectal bleeding   Calculus of gallbladder with biliary obstruction but without cholecystitis            Marvin Ramirez MD  12/13/18 [id number]

## 2018-12-18 LAB
BACTERIA SPEC AEROBE CULT: NORMAL
BACTERIA SPEC AEROBE CULT: NORMAL

## 2019-01-11 ENCOUNTER — LAB (OUTPATIENT)
Dept: LAB | Facility: HOSPITAL | Age: 79
End: 2019-01-11
Attending: INTERNAL MEDICINE

## 2019-01-11 ENCOUNTER — TRANSCRIBE ORDERS (OUTPATIENT)
Dept: LAB | Facility: HOSPITAL | Age: 79
End: 2019-01-11

## 2019-01-11 DIAGNOSIS — K80.20 CALCULUS OF CYSTIC DUCT: Primary | ICD-10-CM

## 2019-01-11 DIAGNOSIS — K80.20 CALCULUS OF CYSTIC DUCT: ICD-10-CM

## 2019-01-11 LAB
ALBUMIN SERPL-MCNC: 3.8 G/DL (ref 3.4–4.8)
ALBUMIN/GLOB SERPL: 0.8 G/DL (ref 1.1–1.8)
ALP SERPL-CCNC: 1245 U/L (ref 38–126)
ALT SERPL W P-5'-P-CCNC: 59 U/L (ref 9–52)
AMMONIA BLD-SCNC: <9 UMOL/L (ref 9–30)
ANION GAP SERPL CALCULATED.3IONS-SCNC: 11 MMOL/L (ref 5–15)
AST SERPL-CCNC: 124 U/L (ref 14–36)
BASOPHILS # BLD AUTO: 0.01 10*3/MM3 (ref 0–0.2)
BASOPHILS NFR BLD AUTO: 0.2 % (ref 0–2)
BILIRUB SERPL-MCNC: 3.5 MG/DL (ref 0.2–1.3)
BUN BLD-MCNC: 9 MG/DL (ref 7–21)
BUN/CREAT SERPL: 13.4 (ref 7–25)
CALCIUM SPEC-SCNC: 9 MG/DL (ref 8.4–10.2)
CHLORIDE SERPL-SCNC: 99 MMOL/L (ref 95–110)
CO2 SERPL-SCNC: 27 MMOL/L (ref 22–31)
CREAT BLD-MCNC: 0.67 MG/DL (ref 0.5–1)
DEPRECATED RDW RBC AUTO: 52.5 FL (ref 36.4–46.3)
EOSINOPHIL # BLD AUTO: 0.11 10*3/MM3 (ref 0–0.7)
EOSINOPHIL NFR BLD AUTO: 2 % (ref 0–7)
ERYTHROCYTE [DISTWIDTH] IN BLOOD BY AUTOMATED COUNT: 15.4 % (ref 11.5–14.5)
GFR SERPL CREATININE-BSD FRML MDRD: 85 ML/MIN/1.73 (ref 39–90)
GLOBULIN UR ELPH-MCNC: 4.7 GM/DL (ref 2.3–3.5)
GLUCOSE BLD-MCNC: 129 MG/DL (ref 60–100)
HCT VFR BLD AUTO: 34.5 % (ref 35–45)
HGB BLD-MCNC: 11.7 G/DL (ref 12–15.5)
IMM GRANULOCYTES # BLD AUTO: 0.01 10*3/MM3 (ref 0–0.02)
IMM GRANULOCYTES NFR BLD AUTO: 0.2 % (ref 0–0.5)
INR PPP: 0.93 (ref 0.8–1.2)
LYMPHOCYTES # BLD AUTO: 1.03 10*3/MM3 (ref 0.6–4.2)
LYMPHOCYTES NFR BLD AUTO: 18.4 % (ref 10–50)
MCH RBC QN AUTO: 31.6 PG (ref 26.5–34)
MCHC RBC AUTO-ENTMCNC: 33.9 G/DL (ref 31.4–36)
MCV RBC AUTO: 93.2 FL (ref 80–98)
MONOCYTES # BLD AUTO: 0.38 10*3/MM3 (ref 0–0.9)
MONOCYTES NFR BLD AUTO: 6.8 % (ref 0–12)
NEUTROPHILS # BLD AUTO: 4.06 10*3/MM3 (ref 2–8.6)
NEUTROPHILS NFR BLD AUTO: 72.4 % (ref 37–80)
PLATELET # BLD AUTO: 136 10*3/MM3 (ref 150–450)
PMV BLD AUTO: 11.5 FL (ref 8–12)
POTASSIUM BLD-SCNC: 4.2 MMOL/L (ref 3.5–5.1)
PROT SERPL-MCNC: 8.5 G/DL (ref 6.3–8.6)
PROTHROMBIN TIME: 12.3 SECONDS (ref 11.1–15.3)
RBC # BLD AUTO: 3.7 10*6/MM3 (ref 3.77–5.16)
SODIUM BLD-SCNC: 137 MMOL/L (ref 137–145)
WBC NRBC COR # BLD: 5.6 10*3/MM3 (ref 3.2–9.8)

## 2019-01-11 PROCEDURE — 82140 ASSAY OF AMMONIA: CPT

## 2019-01-11 PROCEDURE — 80053 COMPREHEN METABOLIC PANEL: CPT

## 2019-01-11 PROCEDURE — 85610 PROTHROMBIN TIME: CPT

## 2019-01-11 PROCEDURE — 85025 COMPLETE CBC W/AUTO DIFF WBC: CPT

## 2019-01-11 PROCEDURE — 82105 ALPHA-FETOPROTEIN SERUM: CPT

## 2019-01-11 PROCEDURE — 36415 COLL VENOUS BLD VENIPUNCTURE: CPT

## 2019-01-12 LAB — AFP-TM SERPL-MCNC: 5.4 NG/ML (ref 0–8.3)

## 2019-01-23 ENCOUNTER — HOSPITAL ENCOUNTER (OUTPATIENT)
Dept: MRI IMAGING | Facility: HOSPITAL | Age: 79
Discharge: HOME | End: 2019-01-23
Admitting: INTERNAL MEDICINE

## 2019-01-23 DIAGNOSIS — K74.3 PRIMARY BILIARY CIRRHOSIS (HCC): ICD-10-CM

## 2019-01-23 DIAGNOSIS — R74.8 ELEVATED LIVER ENZYMES: ICD-10-CM

## 2019-01-23 PROCEDURE — A9576 INJ PROHANCE MULTIPACK: HCPCS | Performed by: INTERNAL MEDICINE

## 2019-01-23 PROCEDURE — 25010000002 GADOTERIDOL PER 1 ML: Performed by: INTERNAL MEDICINE

## 2019-01-23 PROCEDURE — 74183 MRI ABD W/O CNTR FLWD CNTR: CPT

## 2019-01-23 RX ADMIN — GADOTERIDOL 11 ML: 279.3 INJECTION, SOLUTION INTRAVENOUS at 08:35

## 2019-02-01 ENCOUNTER — ANESTHESIA EVENT (OUTPATIENT)
Dept: GASTROENTEROLOGY | Facility: HOSPITAL | Age: 79
End: 2019-02-01

## 2019-02-01 ENCOUNTER — HOSPITAL ENCOUNTER (OUTPATIENT)
Facility: HOSPITAL | Age: 79
Setting detail: HOSPITAL OUTPATIENT SURGERY
Discharge: HOME | End: 2019-02-01
Attending: INTERNAL MEDICINE | Admitting: INTERNAL MEDICINE

## 2019-02-01 ENCOUNTER — ANESTHESIA (OUTPATIENT)
Dept: GASTROENTEROLOGY | Facility: HOSPITAL | Age: 79
End: 2019-02-01

## 2019-02-01 VITALS
OXYGEN SATURATION: 97 % | HEIGHT: 65 IN | BODY MASS INDEX: 18.29 KG/M2 | TEMPERATURE: 97.5 F | SYSTOLIC BLOOD PRESSURE: 140 MMHG | HEART RATE: 74 BPM | RESPIRATION RATE: 18 BRPM | WEIGHT: 109.79 LBS | DIASTOLIC BLOOD PRESSURE: 56 MMHG

## 2019-02-01 DIAGNOSIS — K92.2 UPPER GASTROINTESTINAL HEMORRHAGE: ICD-10-CM

## 2019-02-01 PROCEDURE — 25010000002 PROPOFOL 10 MG/ML EMULSION: Performed by: NURSE ANESTHETIST, CERTIFIED REGISTERED

## 2019-02-01 PROCEDURE — 25010000002 MIDAZOLAM PER 1 MG: Performed by: NURSE ANESTHETIST, CERTIFIED REGISTERED

## 2019-02-01 PROCEDURE — 88305 TISSUE EXAM BY PATHOLOGIST: CPT | Performed by: INTERNAL MEDICINE

## 2019-02-01 PROCEDURE — 25010000002 FENTANYL CITRATE (PF) 100 MCG/2ML SOLUTION: Performed by: NURSE ANESTHETIST, CERTIFIED REGISTERED

## 2019-02-01 PROCEDURE — 88305 TISSUE EXAM BY PATHOLOGIST: CPT | Performed by: PATHOLOGY

## 2019-02-01 RX ORDER — PROPOFOL 10 MG/ML
VIAL (ML) INTRAVENOUS AS NEEDED
Status: DISCONTINUED | OUTPATIENT
Start: 2019-02-01 | End: 2019-02-01 | Stop reason: SURG

## 2019-02-01 RX ORDER — DEXTROSE AND SODIUM CHLORIDE 5; .45 G/100ML; G/100ML
20 INJECTION, SOLUTION INTRAVENOUS CONTINUOUS
Status: DISCONTINUED | OUTPATIENT
Start: 2019-02-01 | End: 2019-02-01 | Stop reason: HOSPADM

## 2019-02-01 RX ORDER — MIDAZOLAM HYDROCHLORIDE 1 MG/ML
INJECTION INTRAMUSCULAR; INTRAVENOUS AS NEEDED
Status: DISCONTINUED | OUTPATIENT
Start: 2019-02-01 | End: 2019-02-01 | Stop reason: SURG

## 2019-02-01 RX ORDER — FENTANYL CITRATE 50 UG/ML
INJECTION, SOLUTION INTRAMUSCULAR; INTRAVENOUS AS NEEDED
Status: DISCONTINUED | OUTPATIENT
Start: 2019-02-01 | End: 2019-02-01 | Stop reason: SURG

## 2019-02-01 RX ORDER — LIDOCAINE HYDROCHLORIDE 10 MG/ML
INJECTION, SOLUTION INFILTRATION; PERINEURAL AS NEEDED
Status: DISCONTINUED | OUTPATIENT
Start: 2019-02-01 | End: 2019-02-01 | Stop reason: SURG

## 2019-02-01 RX ADMIN — MIDAZOLAM HYDROCHLORIDE 1 MG: 2 INJECTION, SOLUTION INTRAMUSCULAR; INTRAVENOUS at 14:25

## 2019-02-01 RX ADMIN — LIDOCAINE HYDROCHLORIDE 60 MG: 10 INJECTION, SOLUTION INFILTRATION; PERINEURAL at 14:24

## 2019-02-01 RX ADMIN — PROPOFOL 20 MG: 10 INJECTION, EMULSION INTRAVENOUS at 14:40

## 2019-02-01 RX ADMIN — PROPOFOL 20 MG: 10 INJECTION, EMULSION INTRAVENOUS at 14:48

## 2019-02-01 RX ADMIN — PROPOFOL 20 MG: 10 INJECTION, EMULSION INTRAVENOUS at 14:37

## 2019-02-01 RX ADMIN — PROPOFOL 20 MG: 10 INJECTION, EMULSION INTRAVENOUS at 14:31

## 2019-02-01 RX ADMIN — PROPOFOL 20 MG: 10 INJECTION, EMULSION INTRAVENOUS at 14:52

## 2019-02-01 RX ADMIN — PROPOFOL 60 MG: 10 INJECTION, EMULSION INTRAVENOUS at 14:25

## 2019-02-01 RX ADMIN — PROPOFOL 20 MG: 10 INJECTION, EMULSION INTRAVENOUS at 14:50

## 2019-02-01 RX ADMIN — FENTANYL CITRATE 25 MCG: 50 INJECTION, SOLUTION INTRAMUSCULAR; INTRAVENOUS at 14:30

## 2019-02-01 RX ADMIN — PROPOFOL 20 MG: 10 INJECTION, EMULSION INTRAVENOUS at 14:44

## 2019-02-01 RX ADMIN — PROPOFOL 20 MG: 10 INJECTION, EMULSION INTRAVENOUS at 14:28

## 2019-02-01 RX ADMIN — DEXTROSE AND SODIUM CHLORIDE 20 ML/HR: 5; 450 INJECTION, SOLUTION INTRAVENOUS at 14:15

## 2019-02-01 RX ADMIN — PROPOFOL 20 MG: 10 INJECTION, EMULSION INTRAVENOUS at 14:26

## 2019-02-01 RX ADMIN — PROPOFOL 20 MG: 10 INJECTION, EMULSION INTRAVENOUS at 14:55

## 2019-02-01 RX ADMIN — PROPOFOL 20 MG: 10 INJECTION, EMULSION INTRAVENOUS at 14:35

## 2019-02-01 NOTE — H&P
Daryn Hanna DO,[facility]  Gastroenterology  Hepatology  Endoscopy  Board Certified in Internal Medicine and gastroenterology  [address], [address]  [place]. [address]  - [phone]   F [phone]     GASTROENTEROLOGY HISTORY AND PHYSICAL  NOTE   DARYN HANNA DO.         SUBJECTIVE:   2019    Name: Rosaura Salgado  DOD: 1940        Chief Complaint:       Subjective : Gastrointestinal bleeding with a personal history of primary biliary cirrhosis    Patient is 78 y.o. female presents with desire for elective EGD and colonoscopy.      ROS/HISTORY/ CURRENT MEDICATIONS/OBJECTIVE/VS/PE:   Review of Systems:  All systems unremarkable unless specified below.  Constitutional   HENT  Eyes   Respiratory    Cardiovascular  Gastrointestinal   Endocrine  Genitourinary    Musculoskeletal   Skin  Allergic/Immunologic    Neurological    Hematological  Psychiatric/Behavioral    History:     Past Medical History:   Diagnosis Date   • Allergic rhinitis    • Coronary atherosclerosis of native coronary artery    • Depression    • Fibromyalgia    • GERD (gastroesophageal reflux disease)    • Hyperlipidemia    • Hypertension    • On anticoagulant therapy    • Peptic ulcer    • Peripheral vascular disease, unspecified (CMS/HCC)    • Pneumonia    • Pulmonary embolus (CMS/HCC)      Past Surgical History:   Procedure Laterality Date   • CAROTID ENDARTERECTOMY Left    • KNEE SURGERY       Family History   Problem Relation Age of Onset   • COPD Mother    • Other Father         Dad  from gunshot wound.   • Cancer Other    • Thyroid disease Other      Social History     Tobacco Use   • Smoking status: Former Smoker   • Smokeless tobacco: Never Used   Substance Use Topics   • Alcohol use: No   • Drug use: No     Medications Prior to Admission   Medication Sig Dispense Refill Last Dose   • Cholecalciferol (VITAMIN D PO) Take 1,000 Units by mouth Daily.   2019 at Unknown time   • Multiple  Vitamins-Minerals (MULTIVITAL PO) Take 1 tablet by mouth Daily.   1/31/2019 at Unknown time   • potassium gluconate 595 (99 K) MG tablet tablet Take 595 mg by mouth Daily.   1/31/2019 at Unknown time   • traMADol (ULTRAM) 50 MG tablet Take 50 mg by mouth Every 8 (Eight) Hours As Needed for moderate pain (4-6).   2/1/2019 at Unknown time   • aspirin 81 MG EC tablet Take 81 mg by mouth Daily.   1/29/2019   • HYDROcodone-acetaminophen (NORCO) 5-325 MG per tablet Take 1 tablet by mouth Every 6 (Six) Hours As Needed for Moderate Pain .   More than a month at Unknown time   • pantoprazole (PROTONIX) 40 MG EC tablet Take 1 tablet by mouth Daily. 30 tablet 0 More than a month at Unknown time   • tiZANidine (ZANAFLEX) 4 MG tablet Take 4 mg by mouth At Night As Needed for muscle spasms.   1/30/2019     Allergies:  Penicillins    I have reviewed the patients medical history, surgical history and family history in the available medical record system.     Current Medications:     No current facility-administered medications for this encounter.        Objective     Physical Exam:   Temp:  [97.5 °F (36.4 °C)] 97.5 °F (36.4 °C)  Heart Rate:  [85] 85  Resp:  [18] 18  BP: (149)/(68) 149/68    Physical Exam:  General Appearance:    Alert, cooperative, in no acute distress   Head:    Normocephalic, without obvious abnormality, atraumatic   Eyes:            Lids and lashes normal, conjunctivae and sclerae normal, no   icterus, no pallor, corneas clear, PERRLA   Ears:    Ears appear intact with no abnormalities noted   Throat:   No oral lesions, no thrush, oral mucosa moist   Neck:   No adenopathy, supple, trachea midline, no thyromegaly, no     carotid bruit, no JVD   Back:     No kyphosis present, no scoliosis present, no skin lesions,       erythema or scars, no tenderness to percussion or                   palpation,   range of motion normal   Lungs:     Clear to auscultation,respirations regular, even and                   unlabored     Heart:    Regular rhythm and normal rate, normal S1 and S2, no            murmur, no gallop, no rub, no click   Breast Exam:    Deferred   Abdomen:     Normal bowel sounds, no masses, no organomegaly, soft        non-tender, non-distended, no guarding, no rebound                 tenderness   Genitalia:    Deferred   Extremities:   Moves all extremities well, no edema, no cyanosis, no              redness   Pulses:   Pulses palpable and equal bilaterally   Skin:   No bleeding, bruising or rash   Lymph nodes:   No palpable adenopathy   Neurologic:   Cranial nerves 2 - 12 grossly intact, sensation intact, DTR        present and equal bilaterally      Results Review:     Lab Results   Component Value Date    WBC 5.60 01/11/2019    WBC 9.23 12/13/2018    HGB 11.7 (L) 01/11/2019    HGB 11.7 (L) 12/13/2018    HCT 34.5 (L) 01/11/2019    HCT 34.4 (L) 12/13/2018     (L) 01/11/2019     12/13/2018             No results found for: LIPASE  Lab Results   Component Value Date    INR 0.93 01/11/2019    INR 0.99 12/13/2018          Radiology Review:  Imaging Results (last 72 hours)     ** No results found for the last 72 hours. **           I reviewed the patient's new clinical results.  I reviewed the patient's new imaging results and agree with the interpretation.     ASSESSMENT/PLAN:   ASSESSMENT:   1.  Gastrointestinal bleeding  2.  Primary biliary cirrhosis, worsening    PLAN:   1.  Esophagogastroduodenoscopy  2.  Colonoscopy    Risk and benefits associated with the procedure are reviewed with the patient.  The patient wishes to proceed      Maurice Carmona DO  02/01/19  2:12 PM

## 2019-02-01 NOTE — ANESTHESIA POSTPROCEDURE EVALUATION
Patient: Rosaura Salgado    Procedure Summary     Date:  02/01/19 Room / Location:  [facility] ENDOSCOPY 2 / [facility] ENDOSCOPY    Anesthesia Start:  1422 Anesthesia Stop:      Procedures:       ESOPHAGOGASTRODUODENOSCOPY (N/A )      COLONOSCOPY (N/A ) Diagnosis:       Upper gastrointestinal hemorrhage      (Upper gastrointestinal hemorrhage [K92.2])    Surgeon:  Maurice Carmona DO Provider:  Tamiko Hong CRNA    Anesthesia Type:  MAC ASA Status:  3          Anesthesia Type: MAC  Last vitals  BP   149/68 (02/01/19 1402)   Temp   97.5 °F (36.4 °C) (02/01/19 1402)   Pulse   85 (02/01/19 1402)   Resp   18 (02/01/19 1402)     SpO2   97 % (02/01/19 1402)     Post Anesthesia Care and Evaluation    Patient location during evaluation: bedside  Patient participation: complete - patient participated  Level of consciousness: awake  Pain score: 0  Pain management: adequate  Airway patency: patent  Anesthetic complications: No anesthetic complications  PONV Status: none  Cardiovascular status: acceptable and hemodynamically stable  Respiratory status: acceptable and spontaneous ventilation  Hydration status: acceptable

## 2019-02-01 NOTE — ANESTHESIA PREPROCEDURE EVALUATION
Anesthesia Evaluation                  Airway   Mallampati: II  TM distance: >3 FB  Neck ROM: limited  No difficulty expected  Dental    (+) upper dentures    Pulmonary     breath sounds clear to auscultation  Cardiovascular   Exercise tolerance: good (4-7 METS)    Rhythm: regular  Rate: normal    (+) hypertension, CAD, PVD, hyperlipidemia,  carotid artery disease carotid bilateral      Neuro/Psych  (+) psychiatric history Anxiety,     GI/Hepatic/Renal/Endo    (+)  GERD well controlled, PUD,      Musculoskeletal     Abdominal    Substance History      OB/GYN          Other                      Anesthesia Plan    ASA 3     MAC     Anesthetic plan, all risks, benefits, and alternatives have been provided, discussed and informed consent has been obtained with: patient.    Plan discussed with CRNA.

## 2019-02-04 LAB
LAB AP CASE REPORT: NORMAL
PATH REPORT.FINAL DX SPEC: NORMAL
PATH REPORT.GROSS SPEC: NORMAL

## 2019-08-02 ENCOUNTER — LAB (OUTPATIENT)
Dept: LAB | Facility: HOSPITAL | Age: 79
End: 2019-08-02

## 2019-08-02 DIAGNOSIS — K80.20 CALCULUS OF CYSTIC DUCT: ICD-10-CM

## 2019-08-02 DIAGNOSIS — K74.3 PRIMARY BILIARY CIRRHOSIS (HCC): Primary | ICD-10-CM

## 2019-08-02 LAB
ALBUMIN SERPL-MCNC: 3.8 G/DL (ref 3.5–5.2)
ALBUMIN/GLOB SERPL: 0.9 G/DL
ALP SERPL-CCNC: 465 U/L (ref 39–117)
ALPHA-FETOPROTEIN: 4.73 NG/ML (ref 0–8.3)
ALT SERPL W P-5'-P-CCNC: 44 U/L (ref 1–33)
AMMONIA BLD-SCNC: 24 UMOL/L (ref 11–51)
ANION GAP SERPL CALCULATED.3IONS-SCNC: 12.3 MMOL/L (ref 5–15)
AST SERPL-CCNC: 49 U/L (ref 1–32)
BASOPHILS # BLD MANUAL: 0.04 10*3/MM3 (ref 0–0.2)
BASOPHILS NFR BLD AUTO: 1 % (ref 0–1.5)
BILIRUB SERPL-MCNC: 1 MG/DL (ref 0.2–1.2)
BUN BLD-MCNC: 11 MG/DL (ref 8–23)
BUN/CREAT SERPL: 12.5 (ref 7–25)
CALCIUM SPEC-SCNC: 9.2 MG/DL (ref 8.6–10.5)
CHLORIDE SERPL-SCNC: 100 MMOL/L (ref 98–107)
CO2 SERPL-SCNC: 24.7 MMOL/L (ref 22–29)
CREAT BLD-MCNC: 0.88 MG/DL (ref 0.57–1)
DEPRECATED RDW RBC AUTO: 52 FL (ref 37–54)
EOSINOPHIL # BLD MANUAL: 0.11 10*3/MM3 (ref 0–0.4)
EOSINOPHIL NFR BLD MANUAL: 3.1 % (ref 0.3–6.2)
ERYTHROCYTE [DISTWIDTH] IN BLOOD BY AUTOMATED COUNT: 14.7 % (ref 12.3–15.4)
GFR SERPL CREATININE-BSD FRML MDRD: 62 ML/MIN/1.73
GLOBULIN UR ELPH-MCNC: 4.2 GM/DL
GLUCOSE BLD-MCNC: 99 MG/DL (ref 65–99)
HCT VFR BLD AUTO: 37.7 % (ref 34–46.6)
HGB BLD-MCNC: 12.3 G/DL (ref 12–15.9)
INR PPP: 0.94 (ref 0.8–1.2)
LYMPHOCYTES # BLD MANUAL: 1.06 10*3/MM3 (ref 0.7–3.1)
LYMPHOCYTES NFR BLD MANUAL: 29.6 % (ref 19.6–45.3)
LYMPHOCYTES NFR BLD MANUAL: 8.2 % (ref 5–12)
MCH RBC QN AUTO: 30.8 PG (ref 26.6–33)
MCHC RBC AUTO-ENTMCNC: 32.6 G/DL (ref 31.5–35.7)
MCV RBC AUTO: 94.3 FL (ref 79–97)
MONOCYTES # BLD AUTO: 0.29 10*3/MM3 (ref 0.1–0.9)
NEUTROPHILS # BLD AUTO: 2.08 10*3/MM3 (ref 1.7–7)
NEUTROPHILS NFR BLD MANUAL: 58.2 % (ref 42.7–76)
PLAT MORPH BLD: NORMAL
PLATELET # BLD AUTO: 66 10*3/MM3 (ref 140–450)
PMV BLD AUTO: 12.2 FL (ref 6–12)
POTASSIUM BLD-SCNC: 3.8 MMOL/L (ref 3.5–5.2)
PROT SERPL-MCNC: 8 G/DL (ref 6–8.5)
PROTHROMBIN TIME: 12.4 SECONDS (ref 11.1–15.3)
RBC # BLD AUTO: 4 10*6/MM3 (ref 3.77–5.28)
RBC MORPH BLD: NORMAL
SODIUM BLD-SCNC: 137 MMOL/L (ref 136–145)
WBC MORPH BLD: NORMAL
WBC NRBC COR # BLD: 3.58 10*3/MM3 (ref 3.4–10.8)

## 2019-08-02 PROCEDURE — 85025 COMPLETE CBC W/AUTO DIFF WBC: CPT

## 2019-08-02 PROCEDURE — 82140 ASSAY OF AMMONIA: CPT

## 2019-08-02 PROCEDURE — 85007 BL SMEAR W/DIFF WBC COUNT: CPT

## 2019-08-02 PROCEDURE — 82105 ALPHA-FETOPROTEIN SERUM: CPT

## 2019-08-02 PROCEDURE — 85610 PROTHROMBIN TIME: CPT

## 2019-08-02 PROCEDURE — 80053 COMPREHEN METABOLIC PANEL: CPT

## 2019-08-02 PROCEDURE — 36415 COLL VENOUS BLD VENIPUNCTURE: CPT

## 2023-03-27 ENCOUNTER — HOSPITAL ENCOUNTER (INPATIENT)
Facility: HOSPITAL | Age: 83
LOS: 3 days | Discharge: HOME HEALTH | DRG: 522 | End: 2023-03-31
Attending: EMERGENCY MEDICINE | Admitting: FAMILY MEDICINE
Payer: MEDICARE

## 2023-03-27 ENCOUNTER — APPOINTMENT (OUTPATIENT)
Dept: GENERAL RADIOLOGY | Facility: HOSPITAL | Age: 83
DRG: 522 | End: 2023-03-27
Payer: MEDICARE

## 2023-03-27 ENCOUNTER — APPOINTMENT (OUTPATIENT)
Dept: CT IMAGING | Facility: HOSPITAL | Age: 83
DRG: 522 | End: 2023-03-27
Payer: MEDICARE

## 2023-03-27 DIAGNOSIS — S72.001A CLOSED FRACTURE OF NECK OF RIGHT FEMUR, INITIAL ENCOUNTER: Primary | ICD-10-CM

## 2023-03-27 DIAGNOSIS — Z74.09 IMPAIRED FUNCTIONAL MOBILITY, BALANCE, GAIT, AND ENDURANCE: ICD-10-CM

## 2023-03-27 DIAGNOSIS — S72.001A: ICD-10-CM

## 2023-03-27 DIAGNOSIS — R00.1 BRADYCARDIA: ICD-10-CM

## 2023-03-27 DIAGNOSIS — Z74.09 IMPAIRED MOBILITY AND ADLS: ICD-10-CM

## 2023-03-27 DIAGNOSIS — K74.3 PRIMARY BILIARY CHOLANGITIS: ICD-10-CM

## 2023-03-27 DIAGNOSIS — Z78.9 IMPAIRED MOBILITY AND ADLS: ICD-10-CM

## 2023-03-27 DIAGNOSIS — D69.6 THROMBOCYTOPENIA: ICD-10-CM

## 2023-03-27 DIAGNOSIS — D72.819 LEUKOPENIA, UNSPECIFIED TYPE: ICD-10-CM

## 2023-03-27 DIAGNOSIS — I10 ESSENTIAL HYPERTENSION: ICD-10-CM

## 2023-03-27 DIAGNOSIS — I65.23 CAROTID STENOSIS, ASYMPTOMATIC, BILATERAL: ICD-10-CM

## 2023-03-27 DIAGNOSIS — I25.10 ATHEROSCLEROSIS OF NATIVE CORONARY ARTERY OF NATIVE HEART WITHOUT ANGINA PECTORIS: ICD-10-CM

## 2023-03-27 DIAGNOSIS — E87.6 HYPOKALEMIA: ICD-10-CM

## 2023-03-27 LAB
ALBUMIN SERPL-MCNC: 2.9 G/DL (ref 3.5–5.2)
ALBUMIN/GLOB SERPL: 0.9 G/DL
ALP SERPL-CCNC: 385 U/L (ref 39–117)
ALT SERPL W P-5'-P-CCNC: 30 U/L (ref 1–33)
AMMONIA BLD-SCNC: 21 UMOL/L (ref 11–51)
ANION GAP SERPL CALCULATED.3IONS-SCNC: 9 MMOL/L (ref 5–15)
ANISOCYTOSIS BLD QL: NORMAL
APTT PPP: 32.3 SECONDS (ref 20–40.3)
AST SERPL-CCNC: 46 U/L (ref 1–32)
BASOPHILS # BLD AUTO: 0.01 10*3/MM3 (ref 0–0.2)
BASOPHILS NFR BLD AUTO: 0.4 % (ref 0–1.5)
BILIRUB SERPL-MCNC: 1 MG/DL (ref 0–1.2)
BUN SERPL-MCNC: 11 MG/DL (ref 8–23)
BUN/CREAT SERPL: 10 (ref 7–25)
CALCIUM SPEC-SCNC: 8 MG/DL (ref 8.6–10.5)
CHLORIDE SERPL-SCNC: 107 MMOL/L (ref 98–107)
CO2 SERPL-SCNC: 23 MMOL/L (ref 22–29)
CREAT SERPL-MCNC: 1.1 MG/DL (ref 0.57–1)
DEPRECATED RDW RBC AUTO: 56.9 FL (ref 37–54)
EGFRCR SERPLBLD CKD-EPI 2021: 50.6 ML/MIN/1.73
EOSINOPHIL # BLD AUTO: 0.07 10*3/MM3 (ref 0–0.4)
EOSINOPHIL NFR BLD AUTO: 2.9 % (ref 0.3–6.2)
ERYTHROCYTE [DISTWIDTH] IN BLOOD BY AUTOMATED COUNT: 17.2 % (ref 12.3–15.4)
GLOBULIN UR ELPH-MCNC: 3.1 GM/DL
GLUCOSE SERPL-MCNC: 126 MG/DL (ref 65–99)
HCT VFR BLD AUTO: 30.3 % (ref 34–46.6)
HGB BLD-MCNC: 10 G/DL (ref 12–15.9)
HOLD SPECIMEN: NORMAL
HYPOCHROMIA BLD QL: NORMAL
IMM GRANULOCYTES # BLD AUTO: 0 10*3/MM3 (ref 0–0.05)
IMM GRANULOCYTES NFR BLD AUTO: 0 % (ref 0–0.5)
INR PPP: 1.23 (ref 0.8–1.2)
LYMPHOCYTES # BLD AUTO: 0.84 10*3/MM3 (ref 0.7–3.1)
LYMPHOCYTES NFR BLD AUTO: 35 % (ref 19.6–45.3)
MAGNESIUM SERPL-MCNC: 1.7 MG/DL (ref 1.6–2.4)
MCH RBC QN AUTO: 29.9 PG (ref 26.6–33)
MCHC RBC AUTO-ENTMCNC: 33 G/DL (ref 31.5–35.7)
MCV RBC AUTO: 90.7 FL (ref 79–97)
MONOCYTES # BLD AUTO: 0.17 10*3/MM3 (ref 0.1–0.9)
MONOCYTES NFR BLD AUTO: 7.1 % (ref 5–12)
NEUTROPHILS NFR BLD AUTO: 1.31 10*3/MM3 (ref 1.7–7)
NEUTROPHILS NFR BLD AUTO: 54.6 % (ref 42.7–76)
NRBC BLD AUTO-RTO: 0 /100 WBC (ref 0–0.2)
NT-PROBNP SERPL-MCNC: 1793 PG/ML (ref 0–1800)
OVALOCYTES BLD QL SMEAR: NORMAL
PLATELET # BLD AUTO: 40 10*3/MM3 (ref 140–450)
PMV BLD AUTO: 12.4 FL (ref 6–12)
POTASSIUM SERPL-SCNC: 2.8 MMOL/L (ref 3.5–5.2)
POTASSIUM SERPL-SCNC: 3.4 MMOL/L (ref 3.5–5.2)
PROT SERPL-MCNC: 6 G/DL (ref 6–8.5)
PROTHROMBIN TIME: 15.4 SECONDS (ref 11.1–15.3)
QT INTERVAL: 500 MS
QTC INTERVAL: 452 MS
RBC # BLD AUTO: 3.34 10*6/MM3 (ref 3.77–5.28)
SMALL PLATELETS BLD QL SMEAR: NORMAL
SODIUM SERPL-SCNC: 139 MMOL/L (ref 136–145)
TROPONIN T SERPL HS-MCNC: 23 NG/L
WBC MORPH BLD: NORMAL
WBC NRBC COR # BLD: 2.4 10*3/MM3 (ref 3.4–10.8)
WHOLE BLOOD HOLD COAG: NORMAL
WHOLE BLOOD HOLD COAG: NORMAL
WHOLE BLOOD HOLD SPECIMEN: NORMAL

## 2023-03-27 PROCEDURE — 25010000002 ONDANSETRON PER 1 MG: Performed by: EMERGENCY MEDICINE

## 2023-03-27 PROCEDURE — 84132 ASSAY OF SERUM POTASSIUM: CPT | Performed by: FAMILY MEDICINE

## 2023-03-27 PROCEDURE — 82140 ASSAY OF AMMONIA: CPT | Performed by: EMERGENCY MEDICINE

## 2023-03-27 PROCEDURE — 99285 EMERGENCY DEPT VISIT HI MDM: CPT

## 2023-03-27 PROCEDURE — 25010000002 ONDANSETRON PER 1 MG: Performed by: FAMILY MEDICINE

## 2023-03-27 PROCEDURE — 73030 X-RAY EXAM OF SHOULDER: CPT

## 2023-03-27 PROCEDURE — 73552 X-RAY EXAM OF FEMUR 2/>: CPT

## 2023-03-27 PROCEDURE — 83880 ASSAY OF NATRIURETIC PEPTIDE: CPT | Performed by: EMERGENCY MEDICINE

## 2023-03-27 PROCEDURE — 83735 ASSAY OF MAGNESIUM: CPT | Performed by: EMERGENCY MEDICINE

## 2023-03-27 PROCEDURE — 25010000002 HYDRALAZINE PER 20 MG: Performed by: PHYSICIAN ASSISTANT

## 2023-03-27 PROCEDURE — 85730 THROMBOPLASTIN TIME PARTIAL: CPT | Performed by: EMERGENCY MEDICINE

## 2023-03-27 PROCEDURE — 85025 COMPLETE CBC W/AUTO DIFF WBC: CPT | Performed by: EMERGENCY MEDICINE

## 2023-03-27 PROCEDURE — 80053 COMPREHEN METABOLIC PANEL: CPT | Performed by: EMERGENCY MEDICINE

## 2023-03-27 PROCEDURE — 0 POTASSIUM CHLORIDE 10 MEQ/100ML SOLUTION: Performed by: EMERGENCY MEDICINE

## 2023-03-27 PROCEDURE — 25010000002 MORPHINE PER 10 MG: Performed by: FAMILY MEDICINE

## 2023-03-27 PROCEDURE — 99284 EMERGENCY DEPT VISIT MOD MDM: CPT

## 2023-03-27 PROCEDURE — 70450 CT HEAD/BRAIN W/O DYE: CPT

## 2023-03-27 PROCEDURE — 85007 BL SMEAR W/DIFF WBC COUNT: CPT | Performed by: EMERGENCY MEDICINE

## 2023-03-27 PROCEDURE — 73060 X-RAY EXAM OF HUMERUS: CPT

## 2023-03-27 PROCEDURE — 99223 1ST HOSP IP/OBS HIGH 75: CPT | Performed by: ORTHOPAEDIC SURGERY

## 2023-03-27 PROCEDURE — G0378 HOSPITAL OBSERVATION PER HR: HCPCS

## 2023-03-27 PROCEDURE — 73502 X-RAY EXAM HIP UNI 2-3 VIEWS: CPT

## 2023-03-27 PROCEDURE — 84484 ASSAY OF TROPONIN QUANT: CPT | Performed by: EMERGENCY MEDICINE

## 2023-03-27 PROCEDURE — 85610 PROTHROMBIN TIME: CPT | Performed by: EMERGENCY MEDICINE

## 2023-03-27 PROCEDURE — 86901 BLOOD TYPING SEROLOGIC RH(D): CPT

## 2023-03-27 PROCEDURE — 72125 CT NECK SPINE W/O DYE: CPT

## 2023-03-27 PROCEDURE — 86900 BLOOD TYPING SEROLOGIC ABO: CPT

## 2023-03-27 PROCEDURE — 25010000002 MORPHINE PER 10 MG: Performed by: EMERGENCY MEDICINE

## 2023-03-27 PROCEDURE — 93010 ELECTROCARDIOGRAM REPORT: CPT | Performed by: INTERNAL MEDICINE

## 2023-03-27 PROCEDURE — 93005 ELECTROCARDIOGRAM TRACING: CPT | Performed by: EMERGENCY MEDICINE

## 2023-03-27 RX ORDER — CALCIUM CARBONATE 200(500)MG
2 TABLET,CHEWABLE ORAL 2 TIMES DAILY PRN
Status: DISCONTINUED | OUTPATIENT
Start: 2023-03-27 | End: 2023-03-31 | Stop reason: HOSPADM

## 2023-03-27 RX ORDER — TIZANIDINE 4 MG/1
1 TABLET ORAL EVERY 8 HOURS PRN
COMMUNITY
Start: 2023-01-10

## 2023-03-27 RX ORDER — POTASSIUM CHLORIDE 750 MG/1
40 CAPSULE, EXTENDED RELEASE ORAL AS NEEDED
Status: DISCONTINUED | OUTPATIENT
Start: 2023-03-27 | End: 2023-03-28

## 2023-03-27 RX ORDER — ACETAMINOPHEN 160 MG/5ML
650 SOLUTION ORAL EVERY 4 HOURS PRN
Status: DISCONTINUED | OUTPATIENT
Start: 2023-03-27 | End: 2023-03-31 | Stop reason: HOSPADM

## 2023-03-27 RX ORDER — SODIUM CHLORIDE 0.9 % (FLUSH) 0.9 %
10 SYRINGE (ML) INJECTION AS NEEDED
Status: DISCONTINUED | OUTPATIENT
Start: 2023-03-27 | End: 2023-03-31 | Stop reason: HOSPADM

## 2023-03-27 RX ORDER — TIZANIDINE 4 MG/1
4 TABLET ORAL EVERY 8 HOURS PRN
Status: DISCONTINUED | OUTPATIENT
Start: 2023-03-27 | End: 2023-03-31 | Stop reason: HOSPADM

## 2023-03-27 RX ORDER — SODIUM CHLORIDE 0.9 % (FLUSH) 0.9 %
10 SYRINGE (ML) INJECTION EVERY 12 HOURS SCHEDULED
Status: DISCONTINUED | OUTPATIENT
Start: 2023-03-27 | End: 2023-03-31 | Stop reason: HOSPADM

## 2023-03-27 RX ORDER — AMOXICILLIN 250 MG
2 CAPSULE ORAL 2 TIMES DAILY
Status: DISCONTINUED | OUTPATIENT
Start: 2023-03-27 | End: 2023-03-31 | Stop reason: HOSPADM

## 2023-03-27 RX ORDER — MORPHINE SULFATE 2 MG/ML
2 INJECTION, SOLUTION INTRAMUSCULAR; INTRAVENOUS
Status: DISCONTINUED | OUTPATIENT
Start: 2023-03-27 | End: 2023-03-31 | Stop reason: HOSPADM

## 2023-03-27 RX ORDER — POTASSIUM CHLORIDE 1.5 G/1.77G
40 POWDER, FOR SOLUTION ORAL AS NEEDED
Status: DISCONTINUED | OUTPATIENT
Start: 2023-03-27 | End: 2023-03-28

## 2023-03-27 RX ORDER — POTASSIUM CHLORIDE 7.45 MG/ML
10 INJECTION INTRAVENOUS ONCE
Status: COMPLETED | OUTPATIENT
Start: 2023-03-27 | End: 2023-03-27

## 2023-03-27 RX ORDER — ONDANSETRON 2 MG/ML
4 INJECTION INTRAMUSCULAR; INTRAVENOUS ONCE
Status: COMPLETED | OUTPATIENT
Start: 2023-03-27 | End: 2023-03-27

## 2023-03-27 RX ORDER — MAGNESIUM SULFATE HEPTAHYDRATE 40 MG/ML
2 INJECTION, SOLUTION INTRAVENOUS AS NEEDED
Status: DISCONTINUED | OUTPATIENT
Start: 2023-03-27 | End: 2023-03-31 | Stop reason: HOSPADM

## 2023-03-27 RX ORDER — NALOXONE HCL 0.4 MG/ML
0.4 VIAL (ML) INJECTION
Status: DISCONTINUED | OUTPATIENT
Start: 2023-03-27 | End: 2023-03-31 | Stop reason: HOSPADM

## 2023-03-27 RX ORDER — CARVEDILOL 6.25 MG/1
6.25 TABLET ORAL 2 TIMES DAILY WITH MEALS
Status: DISCONTINUED | OUTPATIENT
Start: 2023-03-27 | End: 2023-03-30

## 2023-03-27 RX ORDER — ONDANSETRON 2 MG/ML
4 INJECTION INTRAMUSCULAR; INTRAVENOUS EVERY 6 HOURS PRN
Status: DISCONTINUED | OUTPATIENT
Start: 2023-03-27 | End: 2023-03-31 | Stop reason: HOSPADM

## 2023-03-27 RX ORDER — MELATONIN
1000 DAILY
Status: DISCONTINUED | OUTPATIENT
Start: 2023-03-28 | End: 2023-03-31 | Stop reason: HOSPADM

## 2023-03-27 RX ORDER — MAGNESIUM SULFATE HEPTAHYDRATE 40 MG/ML
4 INJECTION, SOLUTION INTRAVENOUS AS NEEDED
Status: DISCONTINUED | OUTPATIENT
Start: 2023-03-27 | End: 2023-03-31 | Stop reason: HOSPADM

## 2023-03-27 RX ORDER — ACETAMINOPHEN 325 MG/1
650 TABLET ORAL EVERY 4 HOURS PRN
Status: DISCONTINUED | OUTPATIENT
Start: 2023-03-27 | End: 2023-03-31 | Stop reason: HOSPADM

## 2023-03-27 RX ORDER — ACETAMINOPHEN 650 MG/1
650 SUPPOSITORY RECTAL EVERY 4 HOURS PRN
Status: DISCONTINUED | OUTPATIENT
Start: 2023-03-27 | End: 2023-03-31 | Stop reason: HOSPADM

## 2023-03-27 RX ORDER — BISACODYL 10 MG
10 SUPPOSITORY, RECTAL RECTAL DAILY PRN
Status: DISCONTINUED | OUTPATIENT
Start: 2023-03-27 | End: 2023-03-31 | Stop reason: HOSPADM

## 2023-03-27 RX ORDER — CARVEDILOL 6.25 MG/1
TABLET ORAL
COMMUNITY
Start: 2023-01-11

## 2023-03-27 RX ORDER — MULTIPLE VITAMINS W/ MINERALS TAB 9MG-400MCG
1 TAB ORAL DAILY
Status: DISCONTINUED | OUTPATIENT
Start: 2023-03-28 | End: 2023-03-31 | Stop reason: HOSPADM

## 2023-03-27 RX ORDER — HYDRALAZINE HYDROCHLORIDE 20 MG/ML
10 INJECTION INTRAMUSCULAR; INTRAVENOUS EVERY 6 HOURS PRN
Status: DISCONTINUED | OUTPATIENT
Start: 2023-03-27 | End: 2023-03-31 | Stop reason: HOSPADM

## 2023-03-27 RX ORDER — SODIUM CHLORIDE 9 MG/ML
40 INJECTION, SOLUTION INTRAVENOUS AS NEEDED
Status: DISCONTINUED | OUTPATIENT
Start: 2023-03-27 | End: 2023-03-31 | Stop reason: HOSPADM

## 2023-03-27 RX ORDER — BISACODYL 5 MG/1
5 TABLET, DELAYED RELEASE ORAL DAILY PRN
Status: DISCONTINUED | OUTPATIENT
Start: 2023-03-27 | End: 2023-03-31 | Stop reason: HOSPADM

## 2023-03-27 RX ORDER — SODIUM CHLORIDE 9 MG/ML
125 INJECTION, SOLUTION INTRAVENOUS CONTINUOUS
Status: DISCONTINUED | OUTPATIENT
Start: 2023-03-27 | End: 2023-03-31 | Stop reason: HOSPADM

## 2023-03-27 RX ORDER — HYDROCODONE BITARTRATE AND ACETAMINOPHEN 7.5; 325 MG/1; MG/1
1 TABLET ORAL EVERY 4 HOURS PRN
Status: DISCONTINUED | OUTPATIENT
Start: 2023-03-27 | End: 2023-03-31 | Stop reason: HOSPADM

## 2023-03-27 RX ORDER — DIAPER,BRIEF,INFANT-TODD,DISP
1 EACH MISCELLANEOUS ONCE
Status: COMPLETED | OUTPATIENT
Start: 2023-03-27 | End: 2023-03-27

## 2023-03-27 RX ORDER — POLYETHYLENE GLYCOL 3350 17 G/17G
17 POWDER, FOR SOLUTION ORAL DAILY PRN
Status: DISCONTINUED | OUTPATIENT
Start: 2023-03-27 | End: 2023-03-31 | Stop reason: HOSPADM

## 2023-03-27 RX ORDER — ONDANSETRON 4 MG/1
4 TABLET, FILM COATED ORAL EVERY 6 HOURS PRN
Status: DISCONTINUED | OUTPATIENT
Start: 2023-03-27 | End: 2023-03-31 | Stop reason: HOSPADM

## 2023-03-27 RX ORDER — TRAMADOL HYDROCHLORIDE 50 MG/1
50 TABLET ORAL EVERY 8 HOURS PRN
Status: DISCONTINUED | OUTPATIENT
Start: 2023-03-27 | End: 2023-03-31 | Stop reason: HOSPADM

## 2023-03-27 RX ADMIN — MORPHINE SULFATE 2 MG: 2 INJECTION, SOLUTION INTRAMUSCULAR; INTRAVENOUS at 20:16

## 2023-03-27 RX ADMIN — SODIUM CHLORIDE 125 ML/HR: 9 INJECTION, SOLUTION INTRAVENOUS at 23:03

## 2023-03-27 RX ADMIN — POTASSIUM CHLORIDE 10 MEQ: 7.46 INJECTION, SOLUTION INTRAVENOUS at 11:12

## 2023-03-27 RX ADMIN — POTASSIUM CHLORIDE 10 MEQ: 7.46 INJECTION, SOLUTION INTRAVENOUS at 11:51

## 2023-03-27 RX ADMIN — MORPHINE SULFATE 4 MG: 4 INJECTION INTRAVENOUS at 09:14

## 2023-03-27 RX ADMIN — POTASSIUM CHLORIDE 10 MEQ: 7.46 INJECTION, SOLUTION INTRAVENOUS at 16:35

## 2023-03-27 RX ADMIN — SODIUM CHLORIDE 500 ML: 9 INJECTION, SOLUTION INTRAVENOUS at 10:53

## 2023-03-27 RX ADMIN — ONDANSETRON 4 MG: 2 INJECTION INTRAMUSCULAR; INTRAVENOUS at 09:15

## 2023-03-27 RX ADMIN — ONDANSETRON 4 MG: 2 INJECTION INTRAMUSCULAR; INTRAVENOUS at 20:16

## 2023-03-27 RX ADMIN — DOCUSATE SODIUM 50 MG AND SENNOSIDES 8.6 MG 2 TABLET: 8.6; 5 TABLET, FILM COATED ORAL at 20:16

## 2023-03-27 RX ADMIN — POTASSIUM CHLORIDE 10 MEQ: 7.46 INJECTION, SOLUTION INTRAVENOUS at 16:47

## 2023-03-27 RX ADMIN — CARVEDILOL 6.25 MG: 6.25 TABLET, FILM COATED ORAL at 17:49

## 2023-03-27 RX ADMIN — SODIUM CHLORIDE 125 ML/HR: 9 INJECTION, SOLUTION INTRAVENOUS at 15:26

## 2023-03-27 RX ADMIN — POTASSIUM CHLORIDE 10 MEQ: 7.46 INJECTION, SOLUTION INTRAVENOUS at 15:36

## 2023-03-27 RX ADMIN — POTASSIUM CHLORIDE 10 MEQ: 7.46 INJECTION, SOLUTION INTRAVENOUS at 14:14

## 2023-03-27 RX ADMIN — HYDRALAZINE HYDROCHLORIDE 10 MG: 20 INJECTION INTRAMUSCULAR; INTRAVENOUS at 22:57

## 2023-03-27 RX ADMIN — MORPHINE SULFATE 2 MG: 2 INJECTION, SOLUTION INTRAMUSCULAR; INTRAVENOUS at 13:12

## 2023-03-27 RX ADMIN — BACITRACIN 0.9 G: 500 OINTMENT TOPICAL at 11:23

## 2023-03-27 RX ADMIN — Medication 10 ML: at 20:27

## 2023-03-27 RX ADMIN — SODIUM CHLORIDE 125 ML/HR: 9 INJECTION, SOLUTION INTRAVENOUS at 10:53

## 2023-03-27 NOTE — ED NOTES
Patient Back to CT Scan. Unable to recollect patient Specimens and clean patient wounds at this time. Will attempt when when patient returns.

## 2023-03-27 NOTE — ED NOTES
Pt presents to the ED by EMS with c/o fall this morning two hours prior to arrival. Pt states that she took a muscle relaxer and lost her balance while feeding her cats on the front porch. Pt reports hitting the back of her head and her right hip and then laid on the ground for about two hours before her daughter found her. Pt denies losing consciousness or being or blood thinners.

## 2023-03-27 NOTE — ED NOTES
"Nursing report ED to floor  Rosaura Salgado  82 y.o.  female    HPI:   Chief Complaint   Patient presents with    Fall    Head Injury    Shoulder Pain    Hip Pain       Admitting doctor:   Marty Gloria MD    Consulting provider(s):  Consults       Date and Time Order Name Status Description    3/27/2023 11:17 AM Orthopedics (on-call MD unless specified)               Admitting diagnosis:   The primary encounter diagnosis was Closed fracture of neck of right femur, initial encounter ([facility]). Diagnoses of Leukopenia, unspecified type, Thrombocytopenia ([facility]), Hypokalemia, and Bradycardia were also pertinent to this visit.    Code status:   Current Code Status       Date Active Code Status Order ID Comments User Context       Not on file            Allergies:   Penicillins    Intake and Output  No intake or output data in the 24 hours ending 03/27/23 1122    Weight:       03/27/23  0850   Weight: 49.4 kg (109 lb)       Most recent vitals:   Vitals:    03/27/23 0850 03/27/23 1000 03/27/23 1104 03/27/23 1107   BP: 145/59 163/70     BP Location: Right arm      Patient Position: Lying      Pulse: 50 50     Resp: 18      Temp:   97.6 °F (36.4 °C)    TempSrc:   Oral    SpO2:  92%  93%   Weight: 49.4 kg (109 lb)      Height: 165.1 cm (65\")        Oxygen Therapy: Room Air    Active LDAs/IV Access:   Lines, Drains & Airways       Active LDAs       Name Placement date Placement time Site Days    Peripheral IV 03/22/23 0915 Left Antecubital 03/22/23  0915  Antecubital  5    Peripheral IV 03/27/23 1112 Right Antecubital 03/27/23  1112  Antecubital  less than 1                    Labs (abnormal labs have a star):   Labs Reviewed   COMPREHENSIVE METABOLIC PANEL - Abnormal; Notable for the following components:       Result Value    Glucose 126 (*)     Creatinine 1.10 (*)     Potassium 2.8 (*)     Calcium 8.0 (*)     Albumin 2.9 (*)     AST (SGOT) 46 (*)     Alkaline Phosphatase 385 (*)     eGFR 50.6 (*)     All other components " within normal limits    Narrative:     GFR Normal >60  Chronic Kidney Disease <60  Kidney Failure <15    The GFR formula is only valid for adults with stable renal function between ages 18 and 70.   CBC WITH AUTO DIFFERENTIAL - Abnormal; Notable for the following components:    WBC 2.40 (*)     RBC 3.34 (*)     Hemoglobin 10.0 (*)     Hematocrit 30.3 (*)     RDW 17.2 (*)     RDW-SD 56.9 (*)     MPV 12.4 (*)     Platelets 40 (*)     Neutrophils, Absolute 1.31 (*)     All other components within normal limits   PROTIME-INR - Abnormal; Notable for the following components:    Protime 15.4 (*)     INR 1.23 (*)     All other components within normal limits    Narrative:     Therapeutic range for most indications is 2.0-3.0 INR,  or 2.5-3.5 for mechanical heart valves.   BNP (IN-HOUSE) - Normal    Narrative:     Among patients with dyspnea, NT-proBNP is highly sensitive for the detection of acute congestive heart failure. In addition NT-proBNP of <300 pg/ml effectively rules out acute congestive heart failure with 99% negative predictive value.     MAGNESIUM - Normal   APTT - Normal    Narrative:     The recommended Heparin therapeutic range is 68-97 seconds.   SCAN SLIDE   RAINBOW DRAW    Narrative:     The following orders were created for panel order [place] Draw.  Procedure                               Abnormality         Status                     ---------                               -----------         ------                     Green Top (Gel)[[id number]]                                                             Lavender Top[[id number]]                                     Final result               Gold Top - SST[[id number]]                                   Final result               Light Blue Top[[id number]]                                   Final result                 Please view results for these tests on the individual orders.   SINGLE HSTROPONIN T   AMMONIA   CBC AND DIFFERENTIAL    Narrative:     The  following orders were created for panel order CBC & Differential.  Procedure                               Abnormality         Status                     ---------                               -----------         ------                     CBC Auto Differential[[id number]]        Abnormal            Final result               Scan Slide[[id number]]                                       Final result                 Please view results for these tests on the individual orders.   LAVENDER TOP   GOLD TOP - SST   LIGHT BLUE TOP   GREEN TOP   EXTRA TUBES    Narrative:     The following orders were created for panel order Extra Tubes.  Procedure                               Abnormality         Status                     ---------                               -----------         ------                     [place] Top[[id number]]                                                                      Please view results for these tests on the individual orders.   GRAY TOP       Meds given in ED:   Medications   sodium chloride 0.9 % flush 10 mL (has no administration in time range)   bacitracin ointment 0.9 g (has no administration in time range)   sodium chloride 0.9 % bolus 500 mL (500 mL Intravenous New Bag 3/27/23 1053)   sodium chloride 0.9 % infusion (125 mL/hr Intravenous New Bag 3/27/23 1053)   potassium chloride 10 mEq in 100 mL IVPB (has no administration in time range)     And   potassium chloride 10 mEq in 100 mL IVPB (10 mEq Intravenous New Bag 3/27/23 1112)     And   potassium chloride 10 mEq in 100 mL IVPB (has no administration in time range)     And   potassium chloride 10 mEq in 100 mL IVPB (has no administration in time range)     And   potassium chloride 10 mEq in 100 mL IVPB (has no administration in time range)     And   potassium chloride 10 mEq in 100 mL IVPB (has no administration in time range)   morphine injection 4 mg (4 mg Intravenous Given 3/27/23 0914)   ondansetron (ZOFRAN) injection 4 mg (4 mg  Intravenous Given 3/27/23 0915)     sodium chloride, 125 mL/hr, Last Rate: 125 mL/hr (03/27/23 1053)         NIH Stroke Scale:       Isolation/Infection(s):  No active isolations   No active infections     COVID Testing  Collected No  Resulted N/A    Nursing report ED to floor:  Mental status: AOx4  Ambulatory status: Bedbound r/t injury  Precautions: Falls    ED nurse phone extentsion- [phone]

## 2023-03-27 NOTE — NURSING NOTE
Clarissa from ED just informed this RN that she just brought up the patient's wallet.  She stated it was left in the ED.  She stated it is in the patient's posesssion in the bed with her.

## 2023-03-27 NOTE — H&P
[facility] Medicine Admission      Date of Admission: 3/27/2023      Primary Care Physician: Vitaliy Kerr MD      Chief Complaint: Right hip pain    HPI: Patient is a 82-year-old female with past medical history notable for CAD, primary biliary cirrhosis, thrombocytopenia, hypertension, and PVD who presented to the emergency department after a ground-level fall at home.  Patient states that she was bending over to pick something off the ground when she bumped her head against her dog house outside and she subsequently fell causing pain to her right hip.  Evaluation in the emergency department was notable for fracture of the right femur neck.  Admission was requested for ongoing care and management.    Concurrent Medical History:  has a past medical history of Allergic rhinitis, Coronary atherosclerosis of native coronary artery, Depression, Fibromyalgia, GERD (gastroesophageal reflux disease), Hyperlipidemia, Hypertension, On anticoagulant therapy, Peptic ulcer, Peripheral vascular disease, unspecified (HCC), Pneumonia, and Pulmonary embolus (HCC).    Past Surgical History:  has a past surgical history that includes Knee surgery; Carotid endarterectomy (Left); Esophagogastroduodenoscopy (N/A, 2/1/2019); and Colonoscopy (N/A, 2/1/2019).    Family History: family history includes COPD in her mother; Cancer in an other family member; Other in her father; Thyroid disease in an other family member.  None    Social History:  reports that she has quit smoking. She has never used smokeless tobacco. She reports that she does not drink alcohol and does not use drugs.    Allergies: No Known Allergies    Medications:   Prior to Admission medications    Medication Sig Start Date End Date Taking? Authorizing Provider   carvedilol (COREG) 6.25 MG tablet  1/11/23  Yes Provider, MD Shweta   Cholecalciferol (VITAMIN D PO) Take 1,000 Units by mouth Daily.   Yes Provider,  MD Shweta   Multiple Vitamins-Minerals (MULTIVITAL PO) Take 1 tablet by mouth Daily.   Yes Shweta Marr MD   tiZANidine (ZANAFLEX) 4 MG tablet Take 1 tablet by mouth Every 8 (Eight) Hours As Needed. 1/10/23  Yes Shweta Marr MD   traMADol (ULTRAM) 50 MG tablet Take 1 tablet by mouth Every 8 (Eight) Hours As Needed for Moderate Pain.   Yes Shweta Marr MD   aspirin 81 MG EC tablet Take 1 tablet by mouth Daily.    Shweta Marr MD   HYDROcodone-acetaminophen (NORCO) 5-325 MG per tablet Take 1 tablet by mouth Every 6 (Six) Hours As Needed for Moderate Pain.    Shweta Marr MD   pantoprazole (PROTONIX) 40 MG EC tablet Take 1 tablet by mouth Daily. 12/13/18   Marvin Ramirez MD   potassium gluconate 595 (99 K) MG tablet tablet Take 595 mg by mouth Daily.    Shweta Marr MD   tiZANidine (ZANAFLEX) 4 MG tablet Take 1 tablet by mouth At Night As Needed for Muscle Spasms.    Shweta Marr MD       Review of Systems:  Review of Systems   Otherwise complete ROS is negative except as mentioned above.    Physical Exam:   Temp:  [97.6 °F (36.4 °C)-98.6 °F (37 °C)] 98.3 °F (36.8 °C)  Heart Rate:  [50-73] 72  Resp:  [16-18] 16  BP: (145-209)/(59-85) 198/84  Physical Exam  Constitutional:       General: She is not in acute distress.     Appearance: She is not toxic-appearing.   HENT:      Head: Normocephalic and atraumatic.      Right Ear: External ear normal.      Left Ear: External ear normal.      Nose: Nose normal.      Mouth/Throat:      Mouth: Mucous membranes are moist.      Pharynx: Oropharynx is clear.   Eyes:      Conjunctiva/sclera: Conjunctivae normal.   Cardiovascular:      Rate and Rhythm: Normal rate and regular rhythm.      Heart sounds: Normal heart sounds.   Pulmonary:      Effort: Pulmonary effort is normal. No respiratory distress.      Breath sounds: Normal breath sounds.   Abdominal:      General: Bowel sounds are normal.      Palpations:  Abdomen is soft.      Tenderness: There is no abdominal tenderness.   Musculoskeletal:         General: No deformity.   Skin:     General: Skin is warm and dry.      Capillary Refill: Capillary refill takes less than 2 seconds.   Neurological:      General: No focal deficit present.      Mental Status: She is alert and oriented to person, place, and time. Mental status is at baseline.   Psychiatric:         Behavior: Behavior normal.           Results Reviewed:  I have personally reviewed current lab, radiology, and data and agree with results.  Lab Results (last 24 hours)     Procedure Component Value Units Date/Time    Extra Tubes [[id number]] Collected: 03/27/23 1137    Specimen: Blood, Venous Line Updated: 03/27/23 1245    Narrative:      The following orders were created for panel order Extra Tubes.  Procedure                               Abnormality         Status                     ---------                               -----------         ------                     Gold Top - SST[[id number]]                                   Final result               Light Blue Top[[id number]]                                   Final result                 Please view results for these tests on the individual orders.    Gold Top - SST [[id number]] Collected: 03/27/23 1137    Specimen: Blood Updated: 03/27/23 1245     Extra Tube Hold for add-ons.     Comment: Auto resulted.       Light Blue Top [[id number]] Collected: 03/27/23 1137    Specimen: Blood Updated: 03/27/23 1245     Extra Tube Hold for add-ons.     Comment: Auto resulted       Marvin Draw [[id number]] Collected: 03/27/23 0919    Specimen: Blood Updated: 03/27/23 1230    Narrative:      The following orders were created for panel order Marvin Draw.  Procedure                               Abnormality         Status                     ---------                               -----------         ------                     Green Top (Gel)[[id number]]                                   Final result               Lavender Top[[id number]]                                     Final result               Gold Top - SST[[id number]]                                   Final result               Light Blue Top[[id number]]                                   Final result                 Please view results for these tests on the individual orders.    Green Top (Gel) [[id number]] Collected: 03/27/23 1125    Specimen: Blood Updated: 03/27/23 1230     Extra Tube Hold for add-ons.     Comment: Auto resulted.       Ammonia [[id number]]  (Normal) Collected: 03/27/23 1125    Specimen: Blood Updated: 03/27/23 1206     Ammonia 21 umol/L     Single High Sensitivity Troponin T [[id number]]  (Abnormal) Collected: 03/27/23 1125    Specimen: Blood Updated: 03/27/23 1203     HS Troponin T 23 ng/L     Narrative:      High Sensitive Troponin T Reference Range:  <10.0 ng/L- Negative Female for AMI  <15.0 ng/L- Negative Male for AMI  >=10 - Abnormal Female indicating possible myocardial injury.  >=15 - Abnormal Male indicating possible myocardial injury.   Clinicians would have to utilize clinical acumen, EKG, Troponin, and serial changes to determine if it is an Acute Myocardial Infarction or myocardial injury due to an underlying chronic condition.         Magnesium [[id number]]  (Normal) Collected: 03/27/23 0919    Specimen: Blood Updated: 03/27/23 1034     Magnesium 1.7 mg/dL     Lavender Top [[id number]] Collected: 03/27/23 0919    Specimen: Blood Updated: 03/27/23 1030     Extra Tube hold for add-on     Comment: Auto resulted       Gold Top - SST [[id number]] Collected: 03/27/23 0919    Specimen: Blood Updated: 03/27/23 1030     Extra Tube Hold for add-ons.     Comment: Auto resulted.       Light Blue Top [[id number]] Collected: 03/27/23 0919    Specimen: Blood Updated: 03/27/23 1030     Extra Tube Hold for add-ons.     Comment: Auto resulted       aPTT [[id number]]  (Normal) Collected: 03/27/23 0919    Specimen: Blood  Updated: 03/27/23 1028     PTT 32.3 seconds     Narrative:      The recommended Heparin therapeutic range is 68-97 seconds.    Protime-INR [[id number]]  (Abnormal) Collected: 03/27/23 0919    Specimen: Blood Updated: 03/27/23 1028     Protime 15.4 Seconds      INR 1.23    Narrative:      Therapeutic range for most indications is 2.0-3.0 INR,  or 2.5-3.5 for mechanical heart valves.    CBC & Differential [[id number]]  (Abnormal) Collected: 03/27/23 0919    Specimen: Blood Updated: 03/27/23 1026    Narrative:      The following orders were created for panel order CBC & Differential.  Procedure                               Abnormality         Status                     ---------                               -----------         ------                     CBC Auto Differential[[id number]]        Abnormal            Final result               Scan Slide[[id number]]                                       Final result                 Please view results for these tests on the individual orders.    Scan Slide [[id number]] Collected: 03/27/23 0919    Specimen: Blood Updated: 03/27/23 1026     Anisocytosis Slight/1+     Hypochromia Slight/1+     Ovalocytes Slight/1+     WBC Morphology Normal     Platelet Estimate Decreased    Comprehensive Metabolic Panel [[id number]]  (Abnormal) Collected: 03/27/23 0919    Specimen: Blood Updated: 03/27/23 0947     Glucose 126 mg/dL      BUN 11 mg/dL      Creatinine 1.10 mg/dL      Sodium 139 mmol/L      Potassium 2.8 mmol/L      Chloride 107 mmol/L      CO2 23.0 mmol/L      Calcium 8.0 mg/dL      Total Protein 6.0 g/dL      Albumin 2.9 g/dL      ALT (SGPT) 30 U/L      AST (SGOT) 46 U/L      Alkaline Phosphatase 385 U/L      Total Bilirubin 1.0 mg/dL      Globulin 3.1 gm/dL      A/G Ratio 0.9 g/dL      BUN/Creatinine Ratio 10.0     Anion Gap 9.0 mmol/L      eGFR 50.6 mL/min/1.73     Narrative:      GFR Normal >60  Chronic Kidney Disease <60  Kidney Failure <15    The GFR formula is only valid for  adults with stable renal function between ages 18 and 70.    BNP [[id number]]  (Normal) Collected: 03/27/23 0919    Specimen: Blood Updated: 03/27/23 0944     proBNP 1,793.0 pg/mL     Narrative:      Among patients with dyspnea, NT-proBNP is highly sensitive for the detection of acute congestive heart failure. In addition NT-proBNP of <300 pg/ml effectively rules out acute congestive heart failure with 99% negative predictive value.      CBC Auto Differential [[id number]]  (Abnormal) Collected: 03/27/23 0919    Specimen: Blood Updated: 03/27/23 0937     WBC 2.40 10*3/mm3      RBC 3.34 10*6/mm3      Hemoglobin 10.0 g/dL      Hematocrit 30.3 %      MCV 90.7 fL      MCH 29.9 pg      MCHC 33.0 g/dL      RDW 17.2 %      RDW-SD 56.9 fl      MPV 12.4 fL      Platelets 40 10*3/mm3      Neutrophil % 54.6 %      Lymphocyte % 35.0 %      Monocyte % 7.1 %      Eosinophil % 2.9 %      Basophil % 0.4 %      Immature Grans % 0.0 %      Neutrophils, Absolute 1.31 10*3/mm3      Lymphocytes, Absolute 0.84 10*3/mm3      Monocytes, Absolute 0.17 10*3/mm3      Eosinophils, Absolute 0.07 10*3/mm3      Basophils, Absolute 0.01 10*3/mm3      Immature Grans, Absolute 0.00 10*3/mm3      nRBC 0.0 /100 WBC         Imaging Results (Last 24 Hours)     Procedure Component Value Units Date/Time    CT Cervical Spine Without Contrast [[id number]] Collected: 03/27/23 1016     Updated: 03/27/23 1124    Narrative:      EXAM: CT cervical spine without contrast  CLINICAL INDICATION: Trauma, injury    COMPARISON: No relevant priors available    TECHNIQUE:    2 mm axial slice thickness images cervical spine  No intravenous contrast was utilized for exam    All CT scans at this facility use dose modulation, iterative  reconstruction, and/or weight based dosing when appropriate to  reduce radiation dose to as low as reasonably achievable.    FINDINGS:    The visualized brain parenchyma is normal.    There is subperiosteal mucosal thickening of the  sphenoid  sinuses. The bilateral mastoid air cells well pneumatized.    The neck soft tissues are symmetric without hematoma, laceration  or subcutaneous gas. There are surgical clips in the region of  left upper neck. There are paraseptal emphysematous changes and  areas of biapical intralobular septal thickening. There is  calcific pleural/parenchymal scarring . No pleural effusion  within the field-of-view.    There is normal cervical lordosis without obvious fracture,  subluxation or aggressive/destructive change. There is no  evidence of traumatic distraction injury. There is multilevel  spondylitic change, endplate sclerosis and multilevel disc space  narrowing. Moderate to severe narrowing at C6-C7.    There is multilevel facet and uncovertebral joint hypertrophy.    There is no prevertebral soft tissue swelling/edema.      Impression:      CONCLUSION:    1. Cervical degenerative changes without fracture.    2. Chronic biapical edema along thickening which may represent  areas of pulmonary edema or atelectasis.    Electronically signed by:  Robert Sow DO  3/27/2023 11:22 AM  CDT Workstation: [id number]    CT Head Without Contrast [[id number]] Collected: 03/27/23 1016     Updated: 03/27/23 1116    Narrative:      EXAM: Head CT without contrast.   CLINICAL INDICATION: Head trauma trauma     COMPARISON: No relevant priors available    TECHNIQUE:      5 mm slice thickness images (vertex to skull base)   No intravenous contrast administered.   Coronal/sagittal reformations were employed.     All CT scans at this facility use dose modulation, iterative  reconstruction, and/or weight based dosing when appropriate to  reduce radiation dose to as low as reasonably achievable.     FINDINGS:      There is mild global parenchymal volume loss.    There is no intraparenchymal or intraventricular hemorrhage.  There is no intra-axial mass or extra-axial fluid collection.   There is no obvious midline shift or mass effect.       There is patchy periventricular hypoattenuation.    The pituitary gland appears normal. There is evidence of cataract  surgery. There is subperiosteal mucosal thickening of the  sphenoid sinuses. Bilateral mastoid air cells our clear.    There is no calvarial or scalp soft tissue abnormality.      Impression:      CONCLUSION:     1. No acute intracranial abnormality identified.  2. Age-related atrophy and microvascular changes.    Electronically signed by:  Robert Sow DO  3/27/2023 11:14 AM  CDT Workstation: [id number]    XR Femur 2 View Right [[id number]] Collected: 03/27/23 0923     Updated: 03/27/23 1015    Narrative:      PROCEDURE: Right femur x-rays with 2 views.    INDICATION: Trauma. Fall.    COMPARISON: Right hip x-ray same date.    FINDINGS:    Impacted subcapital fracture right hip with cephalad displacement  and varus angulation.    Femur distal to this down to the knee is intact with no  associated fractures distally.      Impression:      Impacted subcapital fracture right hip detailed  above.    Remainder right femur intact.      [id number]    Electronically signed by:  Jareth Rodríguez MD  3/27/2023 10:13  AM CDT Workstation: [id number]    XR Hip With or Without Pelvis 2 - 3 View Right [[id number]] Collected: 03/27/23 0923     Updated: 03/27/23 1013    Narrative:      PROCEDURE: Pelvis and right hip x-rays with 3 views.    INDICATION: Trauma. Fall.    COMPARISON: None.    FINDINGS:    There is a subcapital fracture of the right hip with impaction  and cephalad displacement as well as varus angulation.  Femoral head normally aligned with the acetabulum.    No other fractures evident in the bony pelvis or left hip.  Degenerative arthritic changes in the left hip.    Incidental finding of a 2.9-1.2 cm laminated gallbladder calculi  right mid abdomen.      Impression:      Subcapital right hip fracture with impaction and mild cephalad  displacement with varus angulation.    Couple laminated gallbladder  calculi detailed above present on  previous CT abdomen 2018.    [id number]      [id number]    Electronically signed by:  Jareth Rodríguez MD  3/27/2023 10:11  AM CDT Workstation: [id number]    XR Humerus Left [[id number]] Collected: 03/27/23 0923     Updated: 03/27/23 1009    Narrative:      PROCEDURE: Left humerus x-rays with 2 views.    INDICATION: Trauma. Fall.    COMPARISON: None.    FINDINGS:  No fracture or acute osseous abnormality in the left humerus.  No soft tissue abnormality.  The left shoulder and left elbow also appear to be intact.      Impression:      Negative left humerus.      [id number]    Electronically signed by:  Jarteh Rodríguez MD  3/27/2023 10:07  AM CDT Workstation: [id number]    XR Shoulder 2+ View Left [[id number]] Collected: 03/27/23 0922     Updated: 03/27/23 1006    Narrative:      EXAM: XR SHOULDER 2 OR MORE VIEWS LEFT    HISTORY: Left shoulder pain.    COMPARISON: None    FINDINGS:    Mineralization: Osseous demineralization    Bones: No visualized fracture. Degenerative changes at the  acromioclavicular and glenohumeral joint.    Other: Nodular density left midlung      Impression:        No visualized fracture.    Nodular density left midlung. Correlate with dedicated chest  x-ray    Electronically signed by:  Mo Patton DO  3/27/2023 10:04 AM  CDT Workstation: [id number]            Assessment:    Active Hospital Problems    Diagnosis    • **Traumatic closed displaced fracture of neck of right femur, initial encounter ([facility])    • Thrombocytopenia ([facility])    • Carotid stenosis, asymptomatic, bilateral    • Essential hypertension    • Primary biliary cirrhosis    • Coronary atherosclerosis of native coronary artery              Plan:  - Admit for ongoing care  - Orthopedic surgery already consulted  - Surgical correction fracture planned for tomorrow  - Review of her records show that she has had gradually worsening thrombocytopenia over the last several months.  I did discuss risk versus benefits of  blood product transfusion with the patient today and she is in agreement if needed.  Suspect that she may need a sixpack of platelets for surgery.  - I suspect that her underlying cirrhosis is the cause of her thrombocytopenia present.  - She can eat today but will be n.p.o. after midnight  - Monitor for any signs of active bleeding  - Continue home medications as appropriate  - She we will need PT and OT after surgery and suspect that she may need placement  - DVT prophylaxis with CDs given the severity of her thrombocytopenia  - CODE STATUS: Full        Medical Decision Making  Number and Complexity of problems: 4 high complexity    Conditions and Status:        Condition is unchanged.     MDM Data  External documents reviewed: Previous records  My EKG interpretation: Sinus bradycardia with no obvious signs of ACS  My plain film interpretation: Agree with radiology interpretation  Tests considered but not ordered: None     Decision rules/scores evaluated (example [id number], [place], etc): None     Discussed with: Patient     Treatment Plan  As above    Care Planning  Shared decision making: Patient  Code status and discussions: Full    Disposition  Social Determinants of Health that impact treatment or disposition: None  I expect the patient to be discharged to home with home health versus rehab in 2-3 days.     I have utilized all available immediate resources to obtain, update, or review the patient's current medications (including all prescriptions, over-the-counter products, herbals, cannabis/cannabidiol products, and vitamin/mineral/dietary (nutritional) supplements).   I confirmed that the patient's Advance Care Plan is present, code status is documented, or surrogate decision maker is listed in the patient's medical record.    I discussed the patient's findings and my recommendations with: Patient and nursing    Marty Gloria MD

## 2023-03-27 NOTE — ED PROVIDER NOTES
Subjective   History of Present Illness  Patient presents to the emergency department after a fall this morning.  Patient unsure of the circumstances is very quickly.  Patient was bent over this morning and either became dizzy or tripped.  Patient fell forward landing on her left shoulder and injuring her left shoulder and right hip.  Patient also had a wound to the back of her head in the occiput with a skin tear on the right elbow that is nonpainful at this time.  Been no nausea vomiting since the incident.  Patient is not on anticoagulation at this time.  EMS did note heart rates in the high 40s on arrival.  Patient was nonambulatory the daughter had called EMS as she was too painful for her to attempt to get off the ground.    History provided by:  Patient      Review of Systems   Constitutional: Positive for activity change. Negative for appetite change, chills and fever.   HENT: Negative.  Negative for congestion.    Eyes: Negative.  Negative for photophobia and visual disturbance.   Respiratory: Negative.  Negative for cough, chest tightness and shortness of breath.    Cardiovascular: Negative.  Negative for chest pain and palpitations.   Gastrointestinal: Negative.  Negative for abdominal pain, constipation, diarrhea, nausea and vomiting.   Endocrine: Negative.    Genitourinary: Negative.  Negative for decreased urine volume, dysuria, flank pain and hematuria.   Musculoskeletal: Positive for arthralgias and gait problem. Negative for back pain, myalgias, neck pain and neck stiffness.   Skin: Negative.  Negative for pallor.   Neurological: Positive for dizziness, weakness and light-headedness. Negative for syncope, numbness and headaches.   Psychiatric/Behavioral: Negative.  Negative for confusion and suicidal ideas. The patient is not nervous/anxious.        Past Medical History:   Diagnosis Date   • Allergic rhinitis    • Coronary atherosclerosis of native coronary artery    • Depression    • Fibromyalgia     • GERD (gastroesophageal reflux disease)    • Hyperlipidemia    • Hypertension    • On anticoagulant therapy    • Peptic ulcer    • Peripheral vascular disease, unspecified (HCC)    • Pneumonia    • Pulmonary embolus (HCC)        No Known Allergies    Past Surgical History:   Procedure Laterality Date   • CAROTID ENDARTERECTOMY Left    • COLONOSCOPY N/A 2019    Procedure: COLONOSCOPY;  Surgeon: Maurice Carmona DO;  Location: [facility] ENDOSCOPY;  Service: Gastroenterology   • ENDOSCOPY N/A 2019    Procedure: ESOPHAGOGASTRODUODENOSCOPY;  Surgeon: Maurice Carmona DO;  Location: [facility] ENDOSCOPY;  Service: Gastroenterology   • KNEE SURGERY         Family History   Problem Relation Age of Onset   • COPD Mother    • Other Father         Dad  from gunshot wound.   • Cancer Other    • Thyroid disease Other        Social History     Socioeconomic History   • Marital status: Single   Tobacco Use   • Smoking status: Former   • Smokeless tobacco: Never   Vaping Use   • Vaping Use: Never used   Substance and Sexual Activity   • Alcohol use: No   • Drug use: No   • Sexual activity: Defer           Objective   Physical Exam  Vitals and nursing note reviewed.   Constitutional:       General: She is in acute distress.      Appearance: Normal appearance. She is well-developed. She is not ill-appearing or diaphoretic.   HENT:      Head:      Comments: There is a 5 mm laceration to the occipital area, well approximated.  No bleeding at this time.  Soft tissue swelling around this area.     Nose: Nose normal. No congestion or rhinorrhea.      Mouth/Throat:      Mouth: Mucous membranes are moist.   Eyes:      General: No scleral icterus.     Extraocular Movements: Extraocular movements intact.      Conjunctiva/sclera: Conjunctivae normal.   Neck:      Vascular: No JVD.   Cardiovascular:      Rate and Rhythm: Normal rate and regular rhythm.      Heart sounds: Normal heart sounds. No murmur heard.    No friction rub. No  gallop.   Pulmonary:      Effort: Pulmonary effort is normal. No respiratory distress.      Breath sounds: No wheezing or rales.   Chest:      Chest wall: No tenderness.   Abdominal:      General: There is no distension.      Palpations: Abdomen is soft. There is no mass.      Tenderness: There is no abdominal tenderness. There is no guarding or rebound.   Musculoskeletal:         General: Swelling, tenderness and signs of injury present. No deformity. Normal range of motion.      Cervical back: Normal range of motion and neck supple.      Comments: Patient with a tenderness with range of motion of the left upper extremity.  There does appear to be some crepitus with flexion extension of the extremity.  Patient's right lower extremity in the hip is resistant to any range of motion secondary to pain.  Patient notes this in the trochanteric region.  2+ distal pulses in all extremities, symmetric.  Normal sensation.   Lymphadenopathy:      Cervical: No cervical adenopathy.   Skin:     General: Skin is warm and dry.      Capillary Refill: Capillary refill takes less than 2 seconds.      Coloration: Skin is not pale.      Findings: No erythema or rash.   Neurological:      General: No focal deficit present.      Mental Status: She is alert and oriented to person, place, and time.      Cranial Nerves: No cranial nerve deficit.      Motor: No abnormal muscle tone.   Psychiatric:         Behavior: Behavior normal.         Thought Content: Thought content normal.         Judgment: Judgment normal.         Procedures           ED Course                                   [facility] reviewed by Marty Gloria MD, Gilberto Perez MD      Labs Reviewed   COMPREHENSIVE METABOLIC PANEL - Abnormal; Notable for the following components:       Result Value    Glucose 126 (*)     Creatinine 1.10 (*)     Potassium 2.8 (*)     Calcium 8.0 (*)     Albumin 2.9 (*)     AST (SGOT) 46 (*)     Alkaline Phosphatase 385 (*)     eGFR 50.6 (*)      All other components within normal limits    Narrative:     GFR Normal >60  Chronic Kidney Disease <60  Kidney Failure <15    The GFR formula is only valid for adults with stable renal function between ages 18 and 70.   SINGLE HSTROPONIN T - Abnormal; Notable for the following components:    HS Troponin T 23 (*)     All other components within normal limits    Narrative:     High Sensitive Troponin T Reference Range:  <10.0 ng/L- Negative Female for AMI  <15.0 ng/L- Negative Male for AMI  >=10 - Abnormal Female indicating possible myocardial injury.  >=15 - Abnormal Male indicating possible myocardial injury.   Clinicians would have to utilize clinical acumen, EKG, Troponin, and serial changes to determine if it is an Acute Myocardial Infarction or myocardial injury due to an underlying chronic condition.        CBC WITH AUTO DIFFERENTIAL - Abnormal; Notable for the following components:    WBC 2.40 (*)     RBC 3.34 (*)     Hemoglobin 10.0 (*)     Hematocrit 30.3 (*)     RDW 17.2 (*)     RDW-SD 56.9 (*)     MPV 12.4 (*)     Platelets 40 (*)     Neutrophils, Absolute 1.31 (*)     All other components within normal limits   PROTIME-INR - Abnormal; Notable for the following components:    Protime 15.4 (*)     INR 1.23 (*)     All other components within normal limits    Narrative:     Therapeutic range for most indications is 2.0-3.0 INR,  or 2.5-3.5 for mechanical heart valves.   BNP (IN-HOUSE) - Normal    Narrative:     Among patients with dyspnea, NT-proBNP is highly sensitive for the detection of acute congestive heart failure. In addition NT-proBNP of <300 pg/ml effectively rules out acute congestive heart failure with 99% negative predictive value.     MAGNESIUM - Normal   APTT - Normal    Narrative:     The recommended Heparin therapeutic range is 68-97 seconds.   AMMONIA - Normal   RAINBOW DRAW    Narrative:     The following orders were created for panel order [place] Draw.  Procedure                                Abnormality         Status                     ---------                               -----------         ------                     Green Top (Gel)[[id number]]                                  Final result               Lavender Top[[id number]]                                     Final result               Gold Top - SST[[id number]]                                   Final result               Light Blue Top[[id number]]                                   Final result                 Please view results for these tests on the individual orders.   SCAN SLIDE   CBC AND DIFFERENTIAL    Narrative:     The following orders were created for panel order CBC & Differential.  Procedure                               Abnormality         Status                     ---------                               -----------         ------                     CBC Auto Differential[[id number]]        Abnormal            Final result               Scan Slide[[id number]]                                       Final result                 Please view results for these tests on the individual orders.   GREEN TOP   LAVENDER TOP   GOLD TOP - SST   LIGHT BLUE TOP   EXTRA TUBES    Narrative:     The following orders were created for panel order Extra Tubes.  Procedure                               Abnormality         Status                     ---------                               -----------         ------                     Gold Top - SST[[id number]]                                   Final result               Light Blue Top[[id number]]                                   Final result                 Please view results for these tests on the individual orders.   GOLD TOP - SST   LIGHT BLUE TOP   EXTRA TUBES    Narrative:     The following orders were created for panel order Extra Tubes.  Procedure                               Abnormality         Status                     ---------                               -----------         ------                      [place] Top[[id number]]                                                                      Please view results for these tests on the individual orders.   GRAY TOP       CT Head Without Contrast   Final Result   CONCLUSION:       1. No acute intracranial abnormality identified.   2. Age-related atrophy and microvascular changes.      Electronically signed by:  Robert Sow DO  3/27/2023 11:14 AM   CDT Workstation: [id number]      CT Cervical Spine Without Contrast   Final Result   CONCLUSION:      1. Cervical degenerative changes without fracture.      2. Chronic biapical edema along thickening which may represent   areas of pulmonary edema or atelectasis.      Electronically signed by:  Robert Sow DO  3/27/2023 11:22 AM   CDT Workstation: [id number]      XR Femur 2 View Right   Final Result   Impacted subcapital fracture right hip detailed   above.      Remainder right femur intact.         [id number]      Electronically signed by:  Jareth Rodríguez MD  3/27/2023 10:13   AM CDT Workstation: [id number]      XR Hip With or Without Pelvis 2 - 3 View Right   Final Result   Subcapital right hip fracture with impaction and mild cephalad   displacement with varus angulation.      Couple laminated gallbladder calculi detailed above present on   previous CT abdomen 2018.      [id number]         [id number]      Electronically signed by:  Jareth Rodríguez MD  3/27/2023 10:11   AM CDT Workstation: [id number]      XR Shoulder 2+ View Left   Final Result      No visualized fracture.      Nodular density left midlung. Correlate with dedicated chest   x-ray      Electronically signed by:  Mo Patton DO  3/27/2023 10:04 AM   CDT Workstation: [id number]      XR Humerus Left   Final Result   Negative left humerus.         [id number]      Electronically signed by:  Jareth Rodríguez MD  3/27/2023 10:07   AM CDT Workstation: [id number]            Medical Decision Making  Patient with fall of possible concern for near syncope.  Patient with right  femoral neck fracture leukopenia thrombocytopenia hyperkalemia and some bradycardia noted.  Unknown chronicity.  Patient will be admitted for further medical work-up as well as orthopedic consultation for the femoral neck fracture.    Bradycardia: acute illness or injury  Closed fracture of neck of right femur, initial encounter (HCC): chronic illness or injury  Hypokalemia: acute illness or injury  Leukopenia, unspecified type: acute illness or injury  Thrombocytopenia (HCC): acute illness or injury  Amount and/or Complexity of Data Reviewed  Labs: ordered.  Radiology: ordered.  ECG/medicine tests: ordered.      Risk  OTC drugs.  Prescription drug management.  Decision regarding hospitalization.          Final diagnoses:   Closed fracture of neck of right femur, initial encounter (HCC)   Leukopenia, unspecified type   Thrombocytopenia (HCC)   Hypokalemia   Bradycardia       ED Disposition  ED Disposition     ED Disposition   Decision to Admit    Condition   --    Comment   Level of Care: Telemetry [5]   Diagnosis: Closed fracture of neck of right femur, initial encounter ([facility]) [[id number]]   Admitting Physician: DANYA TENORIO [[id number]]   Attending Physician: DANYA TENORIO [[id number]]               No follow-up provider specified.       Medication List      No changes were made to your prescriptions during this visit.          Gilberto Perez MD  03/27/23 [id number]

## 2023-03-28 ENCOUNTER — ANESTHESIA (OUTPATIENT)
Dept: PERIOP | Facility: HOSPITAL | Age: 83
DRG: 522 | End: 2023-03-28
Payer: MEDICARE

## 2023-03-28 ENCOUNTER — ANESTHESIA EVENT (OUTPATIENT)
Dept: PERIOP | Facility: HOSPITAL | Age: 83
DRG: 522 | End: 2023-03-28
Payer: MEDICARE

## 2023-03-28 ENCOUNTER — APPOINTMENT (OUTPATIENT)
Dept: GENERAL RADIOLOGY | Facility: HOSPITAL | Age: 83
DRG: 522 | End: 2023-03-28
Payer: MEDICARE

## 2023-03-28 PROBLEM — S72.001A CLOSED FRACTURE OF NECK OF RIGHT FEMUR, INITIAL ENCOUNTER: Status: ACTIVE | Noted: 2023-03-28

## 2023-03-28 LAB
ABO GROUP BLD: NORMAL
ALBUMIN SERPL-MCNC: 2.9 G/DL (ref 3.5–5.2)
ALBUMIN/GLOB SERPL: 0.7 G/DL
ALP SERPL-CCNC: 395 U/L (ref 39–117)
ALT SERPL W P-5'-P-CCNC: 30 U/L (ref 1–33)
ANION GAP SERPL CALCULATED.3IONS-SCNC: 13 MMOL/L (ref 5–15)
AST SERPL-CCNC: 47 U/L (ref 1–32)
BASOPHILS # BLD AUTO: 0.03 10*3/MM3 (ref 0–0.2)
BASOPHILS NFR BLD AUTO: 0.3 % (ref 0–1.5)
BILIRUB SERPL-MCNC: 2.3 MG/DL (ref 0–1.2)
BLD GP AB SCN SERPL QL: NEGATIVE
BUN SERPL-MCNC: 12 MG/DL (ref 8–23)
BUN/CREAT SERPL: 12.2 (ref 7–25)
CALCIUM SPEC-SCNC: 8.4 MG/DL (ref 8.6–10.5)
CHLORIDE SERPL-SCNC: 107 MMOL/L (ref 98–107)
CO2 SERPL-SCNC: 15 MMOL/L (ref 22–29)
CREAT SERPL-MCNC: 0.98 MG/DL (ref 0.57–1)
DEPRECATED RDW RBC AUTO: 58.4 FL (ref 37–54)
EGFRCR SERPLBLD CKD-EPI 2021: 58.1 ML/MIN/1.73
EOSINOPHIL # BLD AUTO: 0.07 10*3/MM3 (ref 0–0.4)
EOSINOPHIL NFR BLD AUTO: 0.7 % (ref 0.3–6.2)
ERYTHROCYTE [DISTWIDTH] IN BLOOD BY AUTOMATED COUNT: 17.4 % (ref 12.3–15.4)
GLOBULIN UR ELPH-MCNC: 3.9 GM/DL
GLUCOSE SERPL-MCNC: 126 MG/DL (ref 65–99)
HCT VFR BLD AUTO: 39.4 % (ref 34–46.6)
HGB BLD-MCNC: 13 G/DL (ref 12–15.9)
IMM GRANULOCYTES # BLD AUTO: 0.07 10*3/MM3 (ref 0–0.05)
IMM GRANULOCYTES NFR BLD AUTO: 0.7 % (ref 0–0.5)
LYMPHOCYTES # BLD AUTO: 1.51 10*3/MM3 (ref 0.7–3.1)
LYMPHOCYTES NFR BLD AUTO: 14.3 % (ref 19.6–45.3)
Lab: NORMAL
MAGNESIUM SERPL-MCNC: 1.4 MG/DL (ref 1.6–2.4)
MCH RBC QN AUTO: 30.1 PG (ref 26.6–33)
MCHC RBC AUTO-ENTMCNC: 33 G/DL (ref 31.5–35.7)
MCV RBC AUTO: 91.2 FL (ref 79–97)
MONOCYTES # BLD AUTO: 0.8 10*3/MM3 (ref 0.1–0.9)
MONOCYTES NFR BLD AUTO: 7.6 % (ref 5–12)
NEUTROPHILS NFR BLD AUTO: 76.4 % (ref 42.7–76)
NEUTROPHILS NFR BLD AUTO: 8.06 10*3/MM3 (ref 1.7–7)
NRBC BLD AUTO-RTO: 0 /100 WBC (ref 0–0.2)
PLATELET # BLD AUTO: 85 10*3/MM3 (ref 140–450)
PMV BLD AUTO: 12.5 FL (ref 6–12)
POTASSIUM SERPL-SCNC: 4.1 MMOL/L (ref 3.5–5.2)
PROT SERPL-MCNC: 6.8 G/DL (ref 6–8.5)
RBC # BLD AUTO: 4.32 10*6/MM3 (ref 3.77–5.28)
RH BLD: POSITIVE
SODIUM SERPL-SCNC: 135 MMOL/L (ref 136–145)
T&S EXPIRATION DATE: NORMAL
WBC NRBC COR # BLD: 10.54 10*3/MM3 (ref 3.4–10.8)

## 2023-03-28 PROCEDURE — 27236 TREAT THIGH FRACTURE: CPT | Performed by: ORTHOPAEDIC SURGERY

## 2023-03-28 PROCEDURE — 25010000002 PROPOFOL 10 MG/ML EMULSION: Performed by: ANESTHESIOLOGY

## 2023-03-28 PROCEDURE — 25010000002 MAGNESIUM SULFATE 2 GM/50ML SOLUTION: Performed by: FAMILY MEDICINE

## 2023-03-28 PROCEDURE — 97162 PT EVAL MOD COMPLEX 30 MIN: CPT

## 2023-03-28 PROCEDURE — 83735 ASSAY OF MAGNESIUM: CPT | Performed by: FAMILY MEDICINE

## 2023-03-28 PROCEDURE — 0SRR0JA REPLACEMENT OF RIGHT HIP JOINT, FEMORAL SURFACE WITH SYNTHETIC SUBSTITUTE, UNCEMENTED, OPEN APPROACH: ICD-10-PCS | Performed by: ORTHOPAEDIC SURGERY

## 2023-03-28 PROCEDURE — 25010000002 CEFAZOLIN PER 500 MG: Performed by: ORTHOPAEDIC SURGERY

## 2023-03-28 PROCEDURE — 85025 COMPLETE CBC W/AUTO DIFF WBC: CPT | Performed by: FAMILY MEDICINE

## 2023-03-28 PROCEDURE — 25010000002 BUPIVACAINE-MELOXICAM ER 200-6 MG/7ML SOLUTION: Performed by: ORTHOPAEDIC SURGERY

## 2023-03-28 PROCEDURE — 25010000002 ONDANSETRON PER 1 MG: Performed by: NURSE ANESTHETIST, CERTIFIED REGISTERED

## 2023-03-28 PROCEDURE — 25010000002 HYDROMORPHONE 1 MG/ML SOLUTION: Performed by: ANESTHESIOLOGY

## 2023-03-28 PROCEDURE — 86850 RBC ANTIBODY SCREEN: CPT | Performed by: ORTHOPAEDIC SURGERY

## 2023-03-28 PROCEDURE — 86901 BLOOD TYPING SEROLOGIC RH(D): CPT | Performed by: ORTHOPAEDIC SURGERY

## 2023-03-28 PROCEDURE — 25010000002 MIDAZOLAM PER 1 MG: Performed by: ANESTHESIOLOGY

## 2023-03-28 PROCEDURE — 88311 DECALCIFY TISSUE: CPT

## 2023-03-28 PROCEDURE — 94799 UNLISTED PULMONARY SVC/PX: CPT

## 2023-03-28 PROCEDURE — C9088 BUPIVACAINE-MELOXICAM ER 200-6 MG/7ML SOLUTION: HCPCS | Performed by: ORTHOPAEDIC SURGERY

## 2023-03-28 PROCEDURE — 36430 TRANSFUSION BLD/BLD COMPNT: CPT

## 2023-03-28 PROCEDURE — 94640 AIRWAY INHALATION TREATMENT: CPT

## 2023-03-28 PROCEDURE — 25010000002 FENTANYL CITRATE (PF) 50 MCG/ML SOLUTION: Performed by: ANESTHESIOLOGY

## 2023-03-28 PROCEDURE — P9100 PATHOGEN TEST FOR PLATELETS: HCPCS

## 2023-03-28 PROCEDURE — 97165 OT EVAL LOW COMPLEX 30 MIN: CPT

## 2023-03-28 PROCEDURE — 88305 TISSUE EXAM BY PATHOLOGIST: CPT

## 2023-03-28 PROCEDURE — C1776 JOINT DEVICE (IMPLANTABLE): HCPCS | Performed by: ORTHOPAEDIC SURGERY

## 2023-03-28 PROCEDURE — P9035 PLATELET PHERES LEUKOREDUCED: HCPCS

## 2023-03-28 PROCEDURE — 80053 COMPREHEN METABOLIC PANEL: CPT | Performed by: FAMILY MEDICINE

## 2023-03-28 PROCEDURE — 0 POTASSIUM CHLORIDE 10 MEQ/100ML SOLUTION

## 2023-03-28 PROCEDURE — S0260 H&P FOR SURGERY: HCPCS | Performed by: ORTHOPAEDIC SURGERY

## 2023-03-28 PROCEDURE — 25010000002 HYDRALAZINE PER 20 MG: Performed by: PHYSICIAN ASSISTANT

## 2023-03-28 PROCEDURE — 76000 FLUOROSCOPY <1 HR PHYS/QHP: CPT

## 2023-03-28 PROCEDURE — 86900 BLOOD TYPING SEROLOGIC ABO: CPT | Performed by: ORTHOPAEDIC SURGERY

## 2023-03-28 PROCEDURE — 25010000002 MORPHINE PER 10 MG: Performed by: FAMILY MEDICINE

## 2023-03-28 DEVICE — DEV CONTRL TISS STRATAFIX SPIRAL PDO DBLARM 0 24X24CM: Type: IMPLANTABLE DEVICE | Site: HIP | Status: FUNCTIONAL

## 2023-03-28 DEVICE — DEV CONTRL TISS STRATAFIXSPIRALMNCRYL PLSPS2 REV4/0 30CM: Type: IMPLANTABLE DEVICE | Site: HIP | Status: FUNCTIONAL

## 2023-03-28 DEVICE — DEV CONTRL TISS STRATAFIXSPIRAL PDO BIDIR MH 2/0 24X24CM: Type: IMPLANTABLE DEVICE | Site: HIP | Status: FUNCTIONAL

## 2023-03-28 DEVICE — CAP BIPOL HIP CORAIL ACTIS: Type: IMPLANTABLE DEVICE | Site: HIP | Status: FUNCTIONAL

## 2023-03-28 DEVICE — CORAIL HIP SYSTEM CEMENTLESS FEMORAL STEM 12/14 AMT 135 DEGREES KA SIZE 14 HA COATED STANDARD COLLAR
Type: IMPLANTABLE DEVICE | Site: HIP | Status: FUNCTIONAL
Brand: CORAIL

## 2023-03-28 DEVICE — ARTICUL/EZE FEMORAL HEAD DIAMETER 28MM +5 12/14 TAPER
Type: IMPLANTABLE DEVICE | Site: HIP | Status: FUNCTIONAL
Brand: ARTICUL/EZE

## 2023-03-28 DEVICE — SELF CENTERING BI-POLAR HEAD 28MM ID 47MM OD
Type: IMPLANTABLE DEVICE | Site: HIP | Status: FUNCTIONAL
Brand: SELF CENTERING

## 2023-03-28 RX ORDER — FENTANYL CITRATE 50 UG/ML
INJECTION, SOLUTION INTRAMUSCULAR; INTRAVENOUS AS NEEDED
Status: DISCONTINUED | OUTPATIENT
Start: 2023-03-28 | End: 2023-03-28 | Stop reason: SURG

## 2023-03-28 RX ORDER — EPHEDRINE SULFATE 50 MG/ML
5 INJECTION, SOLUTION INTRAVENOUS ONCE AS NEEDED
Status: DISCONTINUED | OUTPATIENT
Start: 2023-03-28 | End: 2023-03-28 | Stop reason: HOSPADM

## 2023-03-28 RX ORDER — ONDANSETRON 2 MG/ML
4 INJECTION INTRAMUSCULAR; INTRAVENOUS ONCE AS NEEDED
Status: DISCONTINUED | OUTPATIENT
Start: 2023-03-28 | End: 2023-03-28 | Stop reason: HOSPADM

## 2023-03-28 RX ORDER — POTASSIUM CHLORIDE 7.45 MG/ML
10 INJECTION INTRAVENOUS
Status: DISCONTINUED | OUTPATIENT
Start: 2023-03-28 | End: 2023-03-31 | Stop reason: HOSPADM

## 2023-03-28 RX ORDER — NALOXONE HCL 0.4 MG/ML
0.4 VIAL (ML) INJECTION AS NEEDED
Status: DISCONTINUED | OUTPATIENT
Start: 2023-03-28 | End: 2023-03-28 | Stop reason: HOSPADM

## 2023-03-28 RX ORDER — FLUMAZENIL 0.1 MG/ML
0.2 INJECTION INTRAVENOUS AS NEEDED
Status: DISCONTINUED | OUTPATIENT
Start: 2023-03-28 | End: 2023-03-28 | Stop reason: HOSPADM

## 2023-03-28 RX ORDER — POTASSIUM CHLORIDE 1.5 G/1.77G
40 POWDER, FOR SOLUTION ORAL AS NEEDED
Status: DISCONTINUED | OUTPATIENT
Start: 2023-03-28 | End: 2023-03-31 | Stop reason: HOSPADM

## 2023-03-28 RX ORDER — PROPOFOL 10 MG/ML
VIAL (ML) INTRAVENOUS AS NEEDED
Status: DISCONTINUED | OUTPATIENT
Start: 2023-03-28 | End: 2023-03-28 | Stop reason: SURG

## 2023-03-28 RX ORDER — ONDANSETRON 2 MG/ML
INJECTION INTRAMUSCULAR; INTRAVENOUS AS NEEDED
Status: DISCONTINUED | OUTPATIENT
Start: 2023-03-28 | End: 2023-03-28 | Stop reason: SURG

## 2023-03-28 RX ORDER — SODIUM CHLORIDE 9 MG/ML
INJECTION, SOLUTION INTRAVENOUS CONTINUOUS PRN
Status: DISCONTINUED | OUTPATIENT
Start: 2023-03-28 | End: 2023-03-28 | Stop reason: SURG

## 2023-03-28 RX ORDER — ALBUTEROL SULFATE 2.5 MG/3ML
2.5 SOLUTION RESPIRATORY (INHALATION) ONCE
Status: COMPLETED | OUTPATIENT
Start: 2023-03-28 | End: 2023-03-28

## 2023-03-28 RX ORDER — LABETALOL HYDROCHLORIDE 5 MG/ML
10 INJECTION, SOLUTION INTRAVENOUS EVERY 4 HOURS PRN
Status: DISCONTINUED | OUTPATIENT
Start: 2023-03-28 | End: 2023-03-31 | Stop reason: HOSPADM

## 2023-03-28 RX ORDER — POTASSIUM CHLORIDE 750 MG/1
40 CAPSULE, EXTENDED RELEASE ORAL AS NEEDED
Status: DISCONTINUED | OUTPATIENT
Start: 2023-03-28 | End: 2023-03-31 | Stop reason: HOSPADM

## 2023-03-28 RX ORDER — PROMETHAZINE HYDROCHLORIDE 25 MG/1
25 SUPPOSITORY RECTAL ONCE AS NEEDED
Status: DISCONTINUED | OUTPATIENT
Start: 2023-03-28 | End: 2023-03-28 | Stop reason: HOSPADM

## 2023-03-28 RX ORDER — ACETAMINOPHEN 325 MG/1
650 TABLET ORAL ONCE AS NEEDED
Status: DISCONTINUED | OUTPATIENT
Start: 2023-03-28 | End: 2023-03-28 | Stop reason: HOSPADM

## 2023-03-28 RX ORDER — LIDOCAINE HYDROCHLORIDE 10 MG/ML
INJECTION, SOLUTION EPIDURAL; INFILTRATION; INTRACAUDAL; PERINEURAL AS NEEDED
Status: DISCONTINUED | OUTPATIENT
Start: 2023-03-28 | End: 2023-03-28 | Stop reason: SURG

## 2023-03-28 RX ORDER — DIPHENHYDRAMINE HYDROCHLORIDE 50 MG/ML
12.5 INJECTION INTRAMUSCULAR; INTRAVENOUS
Status: DISCONTINUED | OUTPATIENT
Start: 2023-03-28 | End: 2023-03-28 | Stop reason: HOSPADM

## 2023-03-28 RX ORDER — PROMETHAZINE HYDROCHLORIDE 25 MG/1
25 TABLET ORAL ONCE AS NEEDED
Status: DISCONTINUED | OUTPATIENT
Start: 2023-03-28 | End: 2023-03-28 | Stop reason: HOSPADM

## 2023-03-28 RX ORDER — ACETAMINOPHEN 650 MG/1
650 SUPPOSITORY RECTAL ONCE AS NEEDED
Status: DISCONTINUED | OUTPATIENT
Start: 2023-03-28 | End: 2023-03-28 | Stop reason: HOSPADM

## 2023-03-28 RX ORDER — BUPIVACAINE HCL/0.9 % NACL/PF 0.1 %
2 PLASTIC BAG, INJECTION (ML) EPIDURAL ONCE
Status: COMPLETED | OUTPATIENT
Start: 2023-03-28 | End: 2023-03-28

## 2023-03-28 RX ORDER — MIDAZOLAM HYDROCHLORIDE 1 MG/ML
INJECTION INTRAMUSCULAR; INTRAVENOUS AS NEEDED
Status: DISCONTINUED | OUTPATIENT
Start: 2023-03-28 | End: 2023-03-28 | Stop reason: SURG

## 2023-03-28 RX ORDER — BUPIVACAINE HCL/0.9 % NACL/PF 0.1 %
2 PLASTIC BAG, INJECTION (ML) EPIDURAL EVERY 8 HOURS
Status: COMPLETED | OUTPATIENT
Start: 2023-03-28 | End: 2023-03-29

## 2023-03-28 RX ADMIN — CEFAZOLIN 2 G: 10 INJECTION, POWDER, FOR SOLUTION INTRAVENOUS at 20:19

## 2023-03-28 RX ADMIN — PROPOFOL 90 MG: 10 INJECTION, EMULSION INTRAVENOUS at 12:00

## 2023-03-28 RX ADMIN — POTASSIUM CHLORIDE 10 MEQ: 7.46 INJECTION, SOLUTION INTRAVENOUS at 04:51

## 2023-03-28 RX ADMIN — HYDROMORPHONE HYDROCHLORIDE 1 MG: 1 INJECTION, SOLUTION INTRAMUSCULAR; INTRAVENOUS; SUBCUTANEOUS at 12:48

## 2023-03-28 RX ADMIN — FENTANYL CITRATE 25 MCG: 50 INJECTION, SOLUTION INTRAMUSCULAR; INTRAVENOUS at 12:29

## 2023-03-28 RX ADMIN — POTASSIUM CHLORIDE 40 MEQ: 1.5 POWDER, FOR SOLUTION ORAL at 00:45

## 2023-03-28 RX ADMIN — ALBUTEROL SULFATE 2.5 MG: 2.5 SOLUTION RESPIRATORY (INHALATION) at 11:14

## 2023-03-28 RX ADMIN — ACETAMINOPHEN 650 MG: 325 TABLET ORAL at 20:19

## 2023-03-28 RX ADMIN — CARVEDILOL 6.25 MG: 6.25 TABLET, FILM COATED ORAL at 17:53

## 2023-03-28 RX ADMIN — SODIUM CHLORIDE: 9 INJECTION, SOLUTION INTRAVENOUS at 11:49

## 2023-03-28 RX ADMIN — MIDAZOLAM HYDROCHLORIDE 1 MG: 1 INJECTION, SOLUTION INTRAMUSCULAR; INTRAVENOUS at 11:49

## 2023-03-28 RX ADMIN — MORPHINE SULFATE 2 MG: 2 INJECTION, SOLUTION INTRAMUSCULAR; INTRAVENOUS at 09:57

## 2023-03-28 RX ADMIN — ONDANSETRON 4 MG: 2 INJECTION INTRAMUSCULAR; INTRAVENOUS at 13:30

## 2023-03-28 RX ADMIN — TRANEXAMIC ACID 1000 MG: 1 INJECTION, SOLUTION INTRAVENOUS at 11:13

## 2023-03-28 RX ADMIN — FENTANYL CITRATE 25 MCG: 50 INJECTION, SOLUTION INTRAMUSCULAR; INTRAVENOUS at 12:24

## 2023-03-28 RX ADMIN — TRANEXAMIC ACID 1000 MG: 100 INJECTION, SOLUTION INTRAVENOUS at 13:15

## 2023-03-28 RX ADMIN — MAGNESIUM SULFATE HEPTAHYDRATE 2 G: 40 INJECTION, SOLUTION INTRAVENOUS at 06:31

## 2023-03-28 RX ADMIN — CARVEDILOL 6.25 MG: 6.25 TABLET, FILM COATED ORAL at 10:40

## 2023-03-28 RX ADMIN — MAGNESIUM SULFATE HEPTAHYDRATE 2 G: 40 INJECTION, SOLUTION INTRAVENOUS at 02:13

## 2023-03-28 RX ADMIN — POTASSIUM CHLORIDE 10 MEQ: 7.46 INJECTION, SOLUTION INTRAVENOUS at 09:56

## 2023-03-28 RX ADMIN — SODIUM CHLORIDE 125 ML/HR: 9 INJECTION, SOLUTION INTRAVENOUS at 23:43

## 2023-03-28 RX ADMIN — MIDAZOLAM HYDROCHLORIDE 1 MG: 1 INJECTION, SOLUTION INTRAMUSCULAR; INTRAVENOUS at 12:34

## 2023-03-28 RX ADMIN — LIDOCAINE HYDROCHLORIDE 40 MG: 10 INJECTION, SOLUTION EPIDURAL; INFILTRATION; INTRACAUDAL; PERINEURAL at 12:00

## 2023-03-28 RX ADMIN — MAGNESIUM SULFATE HEPTAHYDRATE 2 G: 40 INJECTION, SOLUTION INTRAVENOUS at 04:03

## 2023-03-28 RX ADMIN — Medication 2 G: at 11:59

## 2023-03-28 RX ADMIN — POTASSIUM CHLORIDE 10 MEQ: 7.46 INJECTION, SOLUTION INTRAVENOUS at 06:58

## 2023-03-28 RX ADMIN — TRANEXAMIC ACID 1000 MG: 100 INJECTION, SOLUTION INTRAVENOUS at 12:01

## 2023-03-28 RX ADMIN — POTASSIUM CHLORIDE 10 MEQ: 7.46 INJECTION, SOLUTION INTRAVENOUS at 03:58

## 2023-03-28 RX ADMIN — HYDRALAZINE HYDROCHLORIDE 10 MG: 20 INJECTION INTRAMUSCULAR; INTRAVENOUS at 08:12

## 2023-03-28 RX ADMIN — DOCUSATE SODIUM 50 MG AND SENNOSIDES 8.6 MG 2 TABLET: 8.6; 5 TABLET, FILM COATED ORAL at 20:19

## 2023-03-28 RX ADMIN — FENTANYL CITRATE 50 MCG: 50 INJECTION, SOLUTION INTRAMUSCULAR; INTRAVENOUS at 12:33

## 2023-03-28 RX ADMIN — SODIUM CHLORIDE 125 ML/HR: 9 INJECTION, SOLUTION INTRAVENOUS at 11:20

## 2023-03-28 RX ADMIN — MORPHINE SULFATE 2 MG: 2 INJECTION, SOLUTION INTRAMUSCULAR; INTRAVENOUS at 02:10

## 2023-03-28 NOTE — ADDENDUM NOTE
Addendum  created 03/28/23 1405 by Silvino Caballero, CRNA    Order list changed, Pharmacy for encounter modified

## 2023-03-28 NOTE — PROGRESS NOTES
Patient seen and examined in conjunction with the medical student, please refer to my note for any pertinent details.    Marty Gloria MD             [facility] Medicine Services  INPATIENT PROGRESS NOTE      Length of Stay: 0  Date of Admission: 3/27/2023  Primary Care Physician: Vitaliy Kerr MD    Subjective   Chief Complaint: Right Hip Pain  HPI: Patient is an 82-year-old female with past medical history notable for CAD, primary biliary cirrhosis, thrombocytopenia, hypertension, and PVD.  Pt arrived at ED on 3/27 after a fall that morning. On questioning Pt  states that she was bent over and bumped her head. This resulted in a forward fall and the pt landed on her left shoulder and right hip. Pt also presented with a wound on the back of her head and skin tear on right elbow. Pt was unable to get off of the ground and EMS was called. Heart rate was in the 40s on arrival.    Right femur neck fracture was found on ED evaluation.    Review of Systems   All pertinent negatives and positives are as above. All other systems have been reviewed and are negative unless otherwise stated.     Objective    As of today 03/28/23  Temp:  [97.6 °F (36.4 °C)-99.7 °F (37.6 °C)] 97.8 °F (36.6 °C)  Heart Rate:  [63-88] 78  Resp:  [16-18] 18  BP: (178-232)/(62-95) 178/78    Physical Exam      Results Review:  I have reviewed the labs, radiology results, and diagnostic studies.    Laboratory Data:   Results from last 7 days   Lab Units 03/28/23  0548 03/27/23  2113 03/27/23  0919   SODIUM mmol/L 135*  --  139   POTASSIUM mmol/L 4.1 3.4* 2.8*   CHLORIDE mmol/L 107  --  107   CO2 mmol/L 15.0*  --  23.0   BUN mg/dL 12  --  11   CREATININE mg/dL 0.98  --  1.10*   GLUCOSE mg/dL 126*  --  126*   CALCIUM mg/dL 8.4*  --  8.0*   BILIRUBIN mg/dL 2.3*  --  1.0   ALK PHOS U/L 395*  --  385*   ALT (SGPT) U/L 30  --  30   AST (SGOT) U/L 47*  --  46*   ANION GAP mmol/L 13.0  --  9.0     Estimated  Creatinine Clearance: 36.1 mL/min (by C-G formula based on SCr of 0.98 mg/dL).  Results from last 7 days   Lab Units 03/28/23  0110 03/27/23  0919   MAGNESIUM mg/dL 1.4* 1.7         Results from last 7 days   Lab Units 03/28/23  0548 03/27/23  0919   WBC 10*3/mm3 10.54 2.40*   HEMOGLOBIN g/dL 13.0 10.0*   HEMATOCRIT % 39.4 30.3*   PLATELETS 10*3/mm3 85* 40*     Results from last 7 days   Lab Units 03/27/23  0919   INR  1.23*       Culture Data:   No results found for: BLOODCX  No results found for: URINECX  No results found for: RESPCX  No results found for: WOUNDCX  No results found for: STOOLCX  No components found for: BODYFLD    Radiology Data:   Imaging Results (Last 24 Hours)     Procedure Component Value Units Date/Time    CT Cervical Spine Without Contrast [[id number]] Collected: 03/27/23 1016     Updated: 03/27/23 1124    Narrative:      EXAM: CT cervical spine without contrast  CLINICAL INDICATION: Trauma, injury    COMPARISON: No relevant priors available    TECHNIQUE:    2 mm axial slice thickness images cervical spine  No intravenous contrast was utilized for exam    All CT scans at this facility use dose modulation, iterative  reconstruction, and/or weight based dosing when appropriate to  reduce radiation dose to as low as reasonably achievable.    FINDINGS:    The visualized brain parenchyma is normal.    There is subperiosteal mucosal thickening of the sphenoid  sinuses. The bilateral mastoid air cells well pneumatized.    The neck soft tissues are symmetric without hematoma, laceration  or subcutaneous gas. There are surgical clips in the region of  left upper neck. There are paraseptal emphysematous changes and  areas of biapical intralobular septal thickening. There is  calcific pleural/parenchymal scarring . No pleural effusion  within the field-of-view.    There is normal cervical lordosis without obvious fracture,  subluxation or aggressive/destructive change. There is no  evidence of traumatic  distraction injury. There is multilevel  spondylitic change, endplate sclerosis and multilevel disc space  narrowing. Moderate to severe narrowing at C6-C7.    There is multilevel facet and uncovertebral joint hypertrophy.    There is no prevertebral soft tissue swelling/edema.      Impression:      CONCLUSION:    1. Cervical degenerative changes without fracture.    2. Chronic biapical edema along thickening which may represent  areas of pulmonary edema or atelectasis.    Electronically signed by:  Robert Sow DO  3/27/2023 11:22 AM  AdTonikT Workstation: [id number]    CT Head Without Contrast [[id number]] Collected: 03/27/23 1016     Updated: 03/27/23 1116    Narrative:      EXAM: Head CT without contrast.   CLINICAL INDICATION: Head trauma trauma     COMPARISON: No relevant priors available    TECHNIQUE:      5 mm slice thickness images (vertex to skull base)   No intravenous contrast administered.   Coronal/sagittal reformations were employed.     All CT scans at this facility use dose modulation, iterative  reconstruction, and/or weight based dosing when appropriate to  reduce radiation dose to as low as reasonably achievable.     FINDINGS:      There is mild global parenchymal volume loss.    There is no intraparenchymal or intraventricular hemorrhage.  There is no intra-axial mass or extra-axial fluid collection.   There is no obvious midline shift or mass effect.      There is patchy periventricular hypoattenuation.    The pituitary gland appears normal. There is evidence of cataract  surgery. There is subperiosteal mucosal thickening of the  sphenoid sinuses. Bilateral mastoid air cells our clear.    There is no calvarial or scalp soft tissue abnormality.      Impression:      CONCLUSION:     1. No acute intracranial abnormality identified.  2. Age-related atrophy and microvascular changes.    Electronically signed by:  Robert Sow DO  3/27/2023 11:14 AM  CDT Workstation: [id number]          I have reviewed the  patient's current medications.     Assessment/Plan     Principal Problem:    Traumatic closed displaced fracture of neck of right femur, initial encounter ([facility])  Active Problems:    Coronary atherosclerosis of native coronary artery    Essential hypertension    Primary biliary cirrhosis    Carotid stenosis, asymptomatic, bilateral    Thrombocytopenia (HCC)        Treatment Plan  Fracture of the right femur  Pt will undergoing surgical intervention today. Platelets will be on hand during surgery due to history of thrombocytopenia.    DVT prophylaxis  SCDs is indicated.  Pt is at increased bleeding risk due to age thrombocytopenia so anticoagulation is contraindicated. Use of     Thrombocytopenia  likely due to liver cirrhosis and decreased production of thrombopoietin. Pt given platelets prior to surgery. Continue to monitor platelets.       Chapo Ibrahim, Medical Student

## 2023-03-28 NOTE — PLAN OF CARE
Goal Outcome Evaluation:  Plan of Care Reviewed With: (P) patient            Pt admitted w/ fracture of right femur. Noted poor dentition but pt reports this does not affect her ability to eat. Pt states she has always been thin. # w/ BMI 18.64. Pt states she generally is 'not a big eater and does not eat much'. Pt meets ASPEN criteria for moderate chronic malnutrition - see MSA. Pt currently NPO in preparation of surgical procedure for hip fracture. Pt has increased needs. Agrees to whole milk once diet is advanced. Will update accordingly. RDN staff to monitor PO intake, wt trend, and POC.

## 2023-03-28 NOTE — THERAPY EVALUATION
Patient Name: Rosaura Salgado  : 1940    MRN: [id number]                              Today's Date: 3/28/2023       Admit Date: 3/27/2023    Visit Dx:     ICD-10-CM ICD-9-CM   1. Closed fracture of neck of right femur, initial encounter ([facility])  S72.001A 820.8   2. Leukopenia, unspecified type  D72.819 288.50   3. Thrombocytopenia ([facility])  D69.6 287.5   4. Hypokalemia  E87.6 276.8   5. Bradycardia  R00.1 427.89   6. Traumatic closed displaced fracture of neck of right femur, initial encounter ([facility])  S72.001A 820.8   7. Carotid stenosis, asymptomatic, bilateral  I65.23 433.10     433.30   8. Primary biliary cirrhosis  K74.3 571.6   9. Essential hypertension  I10 401.9   10. Atherosclerosis of native coronary artery of native heart without angina pectoris  I25.10 414.01   11. 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)   • Traumatic closed displaced fracture of neck of right femur, initial encounter ([facility])   • Thrombocytopenia ([facility])   • Closed fracture of neck of right femur, initial encounter ([facility])     Past Medical History:   Diagnosis Date   • Allergic rhinitis    • Coronary atherosclerosis of native coronary artery    • Depression    • Fibromyalgia    • GERD (gastroesophageal reflux disease)    • Hyperlipidemia    • Hypertension    • On anticoagulant therapy    • Peptic ulcer    • Peripheral vascular disease, unspecified ([facility])    • Pneumonia    • Pulmonary embolus (HCC)      Past Surgical History:   Procedure Laterality Date   • CAROTID ENDARTERECTOMY Left    • COLONOSCOPY N/A 2019     Procedure: COLONOSCOPY;  Surgeon: Maurice Carmona DO;  Location: [facility] ENDOSCOPY;  Service: Gastroenterology   • ENDOSCOPY N/A 2/1/2019    Procedure: ESOPHAGOGASTRODUODENOSCOPY;  Surgeon: Maurice Carmona DO;  Location: [facility] ENDOSCOPY;  Service: Gastroenterology   • KNEE SURGERY        General Information     Row Name 03/28/23 1620          OT Time and Intention    Document Type evaluation  -     Mode of Treatment occupational therapy  -     Row Name 03/28/23 1620          General Information    Patient Profile Reviewed yes  -     Prior Level of Function independent:;gait;transfer;bed mobility;ADL's;feeding;grooming;dressing;bathing;home management;cleaning;cooking;driving;shopping;using stairs  -     Existing Precautions/Restrictions fall  -     Row Name 03/28/23 1620          Living Environment    People in Home child(jazmin), adult  -     Row Name 03/28/23 1620          Home Main Entrance    Number of Stairs, Main Entrance five  -     Stair Railings, Main Entrance railings on both sides of stairs  -     Row Name 03/28/23 1620          Stairs Within Home, Primary    Stairs, Within Home, Primary Tub/shower, does have a grab bar. Hx of fall out of tub x 1 year ago. Has a rollator but, does not use. Also has a straight cane. Patient reports she has a hard time getting up stairs because of height of step prior to surgery. Always does stairs with daughters help.  -     Row Name 03/28/23 1620          Cognition    Orientation Status (Cognition) oriented x 4  -     Row Name 03/28/23 1620          Safety Issues, Functional Mobility    Safety Issues Affecting Function (Mobility) safety precaution awareness  -     Impairments Affecting Function (Mobility) balance;endurance/activity tolerance  -           User Key  (r) = Recorded By, (t) = Taken By, (c) = Cosigned By    Initials Name Provider Type     Triny Mcdonald OT Occupational Therapist                 Mobility/ADL's     Row Name  03/28/23 1620          Bed Mobility    Bed Mobility supine-sit;sit-supine  -     Supine-Sit [place] (Bed Mobility) minimum assist (75% patient effort)  -     Sit-Supine [place] (Bed Mobility) minimum assist (75% patient effort)  -     Bed Mobility, Safety Issues decreased use of legs for bridging/pushing  -     Assistive Device (Bed Mobility) bed rails;head of bed elevated  -[facility] Name 03/28/23 1620          Transfers    Transfers sit-stand transfer;toilet transfer  -[facility] 03/28/23 1620          Sit-Stand Transfer    Sit-Stand [place] (Transfers) minimum assist (75% patient effort)  -     Assistive Device (Sit-Stand Transfers) walker, front-wheeled  -SJ     Row Name 03/28/23 1620          Toilet Transfer    Type (Toilet Transfer) sit-stand;stand-sit  -     [place] Level (Toilet Transfer) minimum assist (75% patient effort)  -     Assistive Device (Toilet Transfer) walker, front-wheeled;commode chair  -[facility] Name 03/28/23 1620          Activities of Daily Living    BADL Assessment/Intervention lower body dressing;toileting  -SJ     Row Name 03/28/23 1620          Mobility    Extremity Weight-bearing Status right lower extremity  -     Right Lower Extremity (Weight-bearing Status) weight-bearing as tolerated (WBAT)  -SJ     Row Name 03/28/23 1620          Lower Body Dressing Assessment/Training    [place] Level (Lower Body Dressing) doff;don;socks;dependent (less than 25% patient effort)  -[facility] Name 03/28/23 1620          Toileting Assessment/Training    [place] Level (Toileting) toileting skills;moderate assist (50% patient effort)  -           User Key  (r) = Recorded By, (t) = Taken By, (c) = Cosigned By    Initials Name Provider Type     Triny Mcdonald OT Occupational Therapist               Obj/Interventions     Row Name 03/28/23 1620          Sensory Assessment (Somatosensory)    Sensory Assessment (Somatosensory) UE sensation intact   -     Row Name 03/28/23 1620          Range of Motion Comprehensive    General Range of Motion bilateral upper extremity ROM WFL  -     Row Name 03/28/23 1620          Strength Comprehensive (MMT)    General Manual Muscle Testing (MMT) Assessment other (see comments)  -     Comment, General Manual Muscle Testing (MMT) Assessment BUE 4-/5 grossly  -SJ           User Key  (r) = Recorded By, (t) = Taken By, (c) = Cosigned By    Initials Name Provider Type     Triny Mcdonald, OT Occupational Therapist               Goals/Plan     Row Name 03/28/23 1620          Transfer Goal 1 (OT)    Activity/Assistive Device (Transfer Goal 1, OT) toilet  -SJ     [place] Level/Cues Needed (Transfer Goal 1, OT) modified independence  -SJ     Time Frame (Transfer Goal 1, OT) long term goal (LTG);by discharge  -SJ     Progress/Outcome (Transfer Goal 1, OT) goal not met  -[facility] Name 03/28/23 1620          Bathing Goal 1 (OT)    Activity/Device (Bathing Goal 1, OT) lower body bathing  -SJ     [place] Level/Cues Needed (Bathing Goal 1, OT) supervision required  -SJ     Time Frame (Bathing Goal 1, OT) long term goal (LTG);by discharge  -SJ     Progress/Outcomes (Bathing Goal 1, OT) goal not met  -[facility] Name 03/28/23 1620          Dressing Goal 1 (OT)    Activity/Device (Dressing Goal 1, OT) lower body dressing  -SJ     [place]/Cues Needed (Dressing Goal 1, OT) supervision required  -SJ     Time Frame (Dressing Goal 1, OT) long term goal (LTG);by discharge  -SJ     Progress/Outcome (Dressing Goal 1, OT) goal not met  -[facility] Name 03/28/23 1620          Toileting Goal 1 (OT)    Activity/Device (Toileting Goal 1, OT) toileting skills, all  -SJ     [place] Level/Cues Needed (Toileting Goal 1, OT) supervision required  -SJ     Time Frame (Toileting Goal 1, OT) long term goal (LTG);by discharge  -     Progress/Outcome (Toileting Goal 1, OT) goal not met  -[facility] Name 03/28/23 1620          Therapy  Assessment/Plan (OT)    Planned Therapy Interventions (OT) activity tolerance training;adaptive equipment training;BADL retraining;cognitive/visual perception retraining;edema control/reduction;manual therapy/joint mobilization;IADL retraining;occupation/activity based interventions;functional balance retraining;passive ROM/stretching;neuromuscular control/coordination retraining;patient/caregiver education/training;transfer/mobility retraining;ROM/therapeutic exercise;strengthening exercise  -           User Key  (r) = Recorded By, (t) = Taken By, (c) = Cosigned By    Initials Name Provider Type     Triny Mcdonald, OT Occupational Therapist               Clinical Impression     Row Name 23 162          Pain Assessment    Pretreatment Pain Rating 0/10 - no pain  -     Posttreatment Pain Rating 2/10  -     Pain Location - Side/Orientation Right  -     Pain Location - hip  -     Pain Intervention(s) Repositioned;Ambulation/increased activity  -     Row Name 23 162          Plan of Care Review    Plan of Care Reviewed With patient  -     Outcome Evaluation OT eval complete, supine in bed, alert x 4, very plesant and agreeable for evaluation. Supine<>sit with min A. Sit to stand, BSC t/f with min A and RW. LE dressing dependent (socks), toileting with mod A, grooming set up. Returned to bed. Minimal c/o of pain, BP start of session 135/74, end of session post activity 197/81. Patient tolerated well. Patient with decreased balance,  UE strength, decreased safety in transfers, and decreased independence in ADLs. Cont inpatient OT. Patient would benefit from ARU prior to d/c home.  -     Row Name 23 7746          Therapy Assessment/Plan (OT)    Patient/Family Therapy Goal Statement (OT) return home  -     Rehab Potential (OT) good, to achieve stated therapy goals  -     Criteria for Skilled Therapeutic Interventions Met (OT) yes;skilled treatment is necessary  -      Therapy Frequency (OT) daily  -SJ     Predicted Duration of Therapy Intervention (OT) until d/c or all goals met  -     Row Name 03/28/23 1620          Therapy Plan Review/Discharge Plan (OT)    Equipment Needs Upon Discharge (OT) tub bench;commode chair  -     Row Name 03/28/23 1620          Vital Signs    Pre Systolic BP Rehab 135  -SJ     Pre Treatment Diastolic BP 74  -SJ     Post Systolic BP Rehab 197  -SJ     Post Treatment Diastolic BP 81  -SJ     Pretreatment Heart Rate (beats/min) 75  -SJ     Posttreatment Heart Rate (beats/min) 69  -SJ     Pre SpO2 (%) 94  -SJ     O2 Delivery Pre Treatment nasal cannula  2 lpm  -SJ     Post SpO2 (%) 96  -SJ     O2 Delivery Post Treatment nasal cannula  -SJ     Pre Patient Position Supine  -SJ     Post Patient Position Supine  -     Row Name 03/28/23 1620          Positioning and Restraints    Pre-Treatment Position in bed  -SJ     Post Treatment Position bed  -SJ     In Bed notified nsg;supine;call light within reach;encouraged to call for assist;exit alarm on;side rails up x2  -SJ           User Key  (r) = Recorded By, (t) = Taken By, (c) = Cosigned By    Initials Name Provider Type     Triny Mcdonald, OT Occupational Therapist               Outcome Measures     Row Name 03/28/23 1620          How much help from another is currently needed...    Putting on and taking off regular lower body clothing? 1  -SJ     Bathing (including washing, rinsing, and drying) 2  -SJ     Toileting (which includes using toilet bed pan or urinal) 2  -SJ     Putting on and taking off regular upper body clothing 3  -SJ     Taking care of personal grooming (such as brushing teeth) 3  -SJ     Eating meals 4  -SJ     AM-PAC 6 Clicks Score (OT) 15  -SJ     Row Name 03/28/23 1535          How much help from another person do you currently need...    Turning from your back to your side while in flat bed without using bedrails? 2  -SK     Moving from lying on back to sitting on the side of a  flat bed without bedrails? 2  -SK     Moving to and from a bed to a chair (including a wheelchair)? 2  -SK     Standing up from a chair using your arms (e.g., wheelchair, bedside chair)? 2  -SK     Climbing 3-5 steps with a railing? 2  -SK     To walk in hospital room? 2  -SK     AM-PAC 6 Clicks Score (PT) 12  -SK     Highest level of mobility 4 --> Transferred to chair/commode  -SK     Row Name 03/28/23 1620          Functional Assessment    Outcome Measure Options AM-PAC 6 Clicks Daily Activity (OT)  -           User Key  (r) = Recorded By, (t) = Taken By, (c) = Cosigned By    Initials Name Provider Type     Triny Mcdonald OT Occupational Therapist    Fiona Woo RN Registered Nurse                Occupational Therapy Education     Title: PT OT SLP Therapies (In Progress)     Topic: Occupational Therapy (In Progress)     Point: ADL training (Done)     Description:   Instruct learner(s) on proper safety adaptation and remediation techniques during self care or transfers.   Instruct in proper use of assistive devices.              Learning Progress Summary           Patient Acceptance, E,TB, VU,NR by  at 3/28/2023 170    Comment: POC, role of OT, transfer training                   Point: Home exercise program (Not Started)     Description:   Instruct learner(s) on appropriate technique for monitoring, assisting and/or progressing therapeutic exercises/activities.              Learner Progress:  Not documented in this visit.          Point: Precautions (Not Started)     Description:   Instruct learner(s) on prescribed precautions during self-care and functional transfers.              Learner Progress:  Not documented in this visit.          Point: Body mechanics (Not Started)     Description:   Instruct learner(s) on proper positioning and spine alignment during self-care, functional mobility activities and/or exercises.              Learner Progress:  Not documented in this visit.                       User Key     Initials Effective Dates Name Provider Type Discipline     21 -  Triny Mcdonald OT Occupational Therapist OT              OT Recommendation and Plan  Planned Therapy Interventions (OT): activity tolerance training, adaptive equipment training, BADL retraining, cognitive/visual perception retraining, edema control/reduction, manual therapy/joint mobilization, IADL retraining, occupation/activity based interventions, functional balance retraining, passive ROM/stretching, neuromuscular control/coordination retraining, patient/caregiver education/training, transfer/mobility retraining, ROM/therapeutic exercise, strengthening exercise  Therapy Frequency (OT): daily  Plan of Care Review  Plan of Care Reviewed With: patient  Outcome Evaluation: OT eval complete, supine in bed, alert x 4, very plesant and agreeable for evaluation. Supine<>sit with min A. Sit to stand, BSC t/f with min A and RW. LE dressing dependent (socks), toileting with mod A, grooming set up. Returned to bed. Minimal c/o of pain, BP start of session 135/74, end of session post activity 197/81. Patient tolerated well. Patient with decreased balance,  UE strength, decreased safety in transfers, and decreased independence in ADLs. Cont inpatient OT. Patient would benefit from ARU prior to d/c home.     Time Calculation:    Time Calculation- OT     Row Name 23 1705             Time Calculation- OT    OT Start Time 1620  -SJ      OT Stop Time 1700  -SJ      OT Time Calculation (min) 40 min  -SJ      OT Received On 23  -      OT Goal Re-Cert Due Date 04/10/23  -         Untimed Charges    OT Eval/Re-eval Minutes 40  -SJ         Total Minutes    Untimed Charges Total Minutes 40  -SJ       Total Minutes 40  -SJ            User Key  (r) = Recorded By, (t) = Taken By, (c) = Cosigned By    Initials Name Provider Type     Triny Mcdonald OT Occupational Therapist              Therapy Charges for Today      Code Description Service Date Service Provider Modifiers Qty    [id number] HC OT EVAL LOW COMPLEXITY 3 3/28/2023 Tirny Mcdonald, OT GO 1               Triny Mcdonald OT  3/28/2023

## 2023-03-28 NOTE — THERAPY EVALUATION
Patient Name: Rosaura Salgado  : 1940    MRN: [id number]                              Today's Date: 3/28/2023       Admit Date: 3/27/2023    Visit Dx:     ICD-10-CM ICD-9-CM   1. Closed fracture of neck of right femur, initial encounter ([facility])  S72.001A 820.8   2. Leukopenia, unspecified type  D72.819 288.50   3. Thrombocytopenia ([facility])  D69.6 287.5   4. Hypokalemia  E87.6 276.8   5. Bradycardia  R00.1 427.89   6. Traumatic closed displaced fracture of neck of right femur, initial encounter ([facility])  S72.001A 820.8   7. Carotid stenosis, asymptomatic, bilateral  I65.23 433.10     433.30   8. Primary biliary cirrhosis  K74.3 571.6   9. Essential hypertension  I10 401.9   10. Atherosclerosis of native coronary artery of native heart without angina pectoris  I25.10 414.01   11. 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 Impaired mobility and ADLs  Z74.09 V49.89    Z78.9    13. Impaired functional mobility, balance, gait, and endurance  Z74.09 V49.89     Patient Active Problem List   Diagnosis   • Coronary atherosclerosis of native coronary artery   • Pulmonary embolus (HCC)   • Pneumonia   • Fibromyalgia   • Mixed hyperlipidemia   • Essential hypertension   • Primary biliary cirrhosis   • Carotid stenosis, asymptomatic, bilateral   • Atherosclerosis of artery of extremity with intermittent claudication ([facility])   • Traumatic closed displaced fracture of neck of right femur, initial encounter ([facility])   • Thrombocytopenia ([facility])   • Closed fracture of neck of right femur, initial encounter ([facility])     Past Medical History:   Diagnosis Date   • Allergic rhinitis    • Coronary atherosclerosis of native coronary artery    • Depression    • Fibromyalgia    • GERD (gastroesophageal reflux disease)    • Hyperlipidemia    • Hypertension    • On anticoagulant therapy    • Peptic ulcer    • Peripheral vascular disease, unspecified (HCC)    • Pneumonia    • Pulmonary embolus (HCC)      Past Surgical History:   Procedure  Laterality Date   • CAROTID ENDARTERECTOMY Left    • COLONOSCOPY N/A 2/1/2019    Procedure: COLONOSCOPY;  Surgeon: Maurice Carmona DO;  Location: [facility] ENDOSCOPY;  Service: Gastroenterology   • ENDOSCOPY N/A 2/1/2019    Procedure: ESOPHAGOGASTRODUODENOSCOPY;  Surgeon: Maurice Carmona DO;  Location: [facility] ENDOSCOPY;  Service: Gastroenterology   • KNEE SURGERY        General Information     Row Name 03/28/23 1710          Physical Therapy Time and Intention    Document Type evaluation  -     Mode of Treatment individual therapy;physical therapy  -     Row Name 03/28/23 1710          General Information    Patient Profile Reviewed yes  -JALEN     Prior Level of Function independent:;gait;transfer;ADL's;bed mobility;home management;cooking;cleaning;driving;shopping  -     Existing Precautions/Restrictions fall  -     Row Name 03/28/23 1710          Living Environment    People in Home child(jazmin), adult  dtr;dtr is epileptic and does not drive  -     Row Name 03/28/23 1710          Home Main Entrance    Number of Stairs, Main Entrance five  -     Stair Railings, Main Entrance railings on both sides of stairs  -     Row Name 03/28/23 1710          Stairs Within Home, Primary    Number of Stairs, Within Home, Primary five  -JALEN     Stair Railings, Within Home, Primary railings on both sides of stairs  -     Row Name 03/28/23 1710          Cognition    Orientation Status (Cognition) oriented x 4  -     Row Name 03/28/23 1710          Safety Issues, Functional Mobility    Impairments Affecting Function (Mobility) balance;endurance/activity tolerance;range of motion (ROM);strength  -           User Key  (r) = Recorded By, (t) = Taken By, (c) = Cosigned By    Initials Name Provider Type     Lisseth Browning PT Physical Therapist               Mobility     Row Name 03/28/23 8958          Bed Mobility    Bed Mobility supine-sit;sit-supine  -     Supine-Sit [place] (Bed Mobility) minimum assist  (75% patient effort)  -     Sit-Supine [place] (Bed Mobility) minimum assist (75% patient effort)  -     Assistive Device (Bed Mobility) bed rails;head of bed elevated  -[facility] Name 03/28/23 1647          Sit-Stand Transfer    Sit-Stand [place] (Transfers) minimum assist (75% patient effort)  -     Assistive Device (Sit-Stand Transfers) walker, front-wheeled  -     Row Name 03/28/23 1647          Gait/Stairs (Locomotion)    [place] Level (Gait) minimum assist (75% patient effort);nonverbal cues (demo/gesture);verbal cues  -     Assistive Device (Gait) walker, front-wheeled  -     Distance in Feet (Gait) 5ft  -[facility] Name 03/28/23 1647          Mobility    Extremity Weight-bearing Status right lower extremity  -     Right Lower Extremity (Weight-bearing Status) weight-bearing as tolerated (WBAT)  -           User Key  (r) = Recorded By, (t) = Taken By, (c) = Cosigned By    Initials Name Provider Type     Lisseth Browning PT Physical Therapist               Obj/Interventions     [facility] Name 03/28/23 1647          Range of Motion Comprehensive    Comment, General Range of Motion BLE: RLE: AROM WFL ankle/foot; AAROM WFL knee, hip; LLE: AROM WFL  -[facility] Name 03/28/23 1647          Strength Comprehensive (MMT)    Comment, General Manual Muscle Testing (MMT) Assessment BLE: RLE: 3+/5 ankle/foot, 2/5 knee, hip  -[facility] Name 03/28/23 1647          Balance    Balance Assessment sitting static balance;sitting dynamic balance;sit to stand dynamic balance;standing static balance;standing dynamic balance  -     Static Sitting Balance independent  -     Dynamic Sitting Balance standby assist  -     Position, Sitting Balance sitting edge of bed  -     Sit to Stand Dynamic Balance minimal assist  -     Static Standing Balance contact guard  -     Dynamic Standing Balance minimal assist  -     Position/Device Used, Standing Balance walker, front-wheeled  -[facility] Name  03/28/23 1647          Sensory Assessment (Somatosensory)    Sensory Assessment (Somatosensory) LE sensation intact  -           User Key  (r) = Recorded By, (t) = Taken By, (c) = Cosigned By    Initials Name Provider Type    Lisseth Strong, PT Physical Therapist               Goals/Plan     Row Name 03/28/23 1647          Bed Mobility Goal 1 (PT)    Activity/Assistive Device (Bed Mobility Goal 1, PT) bed mobility activities, all  -JALEN     [place] Level/Cues Needed (Bed Mobility Goal 1, PT) independent  -JALEN     Time Frame (Bed Mobility Goal 1, PT) by discharge  -JALEN     Progress/Outcomes (Bed Mobility Goal 1, PT) goal not met  -JALEN     Row Name 03/28/23 1647          Transfer Goal 1 (PT)    Activity/Assistive Device (Transfer Goal 1, PT) sit-to-stand/stand-to-sit;bed-to-chair/chair-to-bed  -JALEN     [place] Level/Cues Needed (Transfer Goal 1, PT) modified independence  -JALEN     Time Frame (Transfer Goal 1, PT) by discharge  -JALEN     Progress/Outcome (Transfer Goal 1, PT) goal not met  -JALEN     Row Name 03/28/23 1647          Gait Training Goal 1 (PT)    Activity/Assistive Device (Gait Training Goal 1, PT) gait (walking locomotion);decrease fall risk;increase endurance/gait distance  -JALEN     [place] Level (Gait Training Goal 1, PT) modified independence;standby assist  -JALEN     Distance (Gait Training Goal 1, PT) 150ft or more each trip  -JALEN     Time Frame (Gait Training Goal 1, PT) by discharge  -JALEN     Progress/Outcome (Gait Training Goal 1, PT) goal not met  -JALEN     Row Name 03/28/23 1647          ROM Goal 1 (PT)    ROM Goal 1 (PT) Pt will improve LE range so she is able to raise/lower BLE on/off bed for supine to sit to supine without assistance  -JALEN     Time Frame (ROM Goal 1, PT) by discharge  -JALEN     Progress/Outcome (ROM Goal 1, PT) goal not met  -     Row Name 03/28/23 1647          Stairs Goal 1 (PT)    Activity/Assistive Device (Stairs Goal 1, PT) ascending stairs;descending stairs;using  handrail, left;using handrail, right  -     [place] Level/Cues Needed (Stairs Goal 1, PT) modified independence  -JALEN     Number of Stairs (Stairs Goal 1, PT) 5  -JALEN     Time Frame (Stairs Goal 1, PT) 1 week  -JALEN     Progress/Outcome (Stairs Goal 1, PT) goal not met  -     Row Name 03/28/23 3921          Therapy Assessment/Plan (PT)    Planned Therapy Interventions (PT) balance training;bed mobility training;gait training;home exercise program;patient/family education;ROM (range of motion);stair training;strengthening;transfer training  -           User Key  (r) = Recorded By, (t) = Taken By, (c) = Cosigned By    Initials Name Provider Type    Lisseth Strong, PT Physical Therapist               Clinical Impression     Row Name 03/28/23 9533          Pain    Pretreatment Pain Rating 0/10 - no pain  -JALEN     Posttreatment Pain Rating 0/10 - no pain  -JALEN     Additional Documentation Pain Scale: Numbers Pre/Post-Treatment (Group)  -     Row Name 03/28/23 4142          Plan of Care Review    Plan of Care Reviewed With patient  -     Outcome Evaluation PT evaluation completed. Pt awake, alert, and oriented x4, and agreeable to therapy. Pt transferred supine to sit to supine with min assistance and sit to stand to sit with FWW with min assistance. Patient ambulated with FWW 5ft to HOB with min assistance. Patient sat EOB prior to ambulation 15 mintues with SBA. After ambulation patient reporting some lightheadedness which resolved once patient returned to supine. O2 sats remained in 90's on 2lpm. Function limited by decreased strength, range, balance, and tolerance for functional mobility and activities s/p R fractured hip surgery. Pt will benefit from PT to gradually regain lost function. Anticipate patient would benefit from additional rehab prior to return home. Patient would benefit from ARU.  -     Row Name 03/28/23 5406          Therapy Assessment/Plan (PT)    Patient/Family Therapy Goals Statement  (PT) return to [facility]  -     Rehab Potential (PT) good, to achieve stated therapy goals  -     Criteria for Skilled Interventions Met (PT) yes;meets criteria;skilled treatment is necessary  -     Therapy Frequency (PT) daily  x2  -JALEN     Predicted Duration of Therapy Intervention (PT) until discharge or goals met  -     Row Name 03/28/23 1647          Vital Signs    Pre Systolic BP Rehab 170  -JALEN     Pre Treatment Diastolic BP 78  -JALEN     Intra Systolic BP Rehab 199  -JALEN     Intra Treatment Diastolic BP 81   dynamap  -JALEN     Post Systolic BP Rehab 136  -JALEN     Post Treatment Diastolic BP 60   manual  -JALEN     Pretreatment Heart Rate (beats/min) 69  -JALEN     Pre SpO2 (%) 96  -JALEN     O2 Delivery Pre Treatment nasal cannula  2lpm  -JALEN     Post SpO2 (%) 97  -JALEN     O2 Delivery Post Treatment nasal cannula  -JAELN     Pre Patient Position Supine  -JALEN     Intra Patient Position Sitting  -JALEN     Post Patient Position Supine  -JALEN     Row Name 03/28/23 1647          Positioning and Restraints    Pre-Treatment Position in bed  -JALEN     Post Treatment Position bed  -JALEN     In Bed fowlers;call light within reach;encouraged to call for assist;exit alarm on  -           User Key  (r) = Recorded By, (t) = Taken By, (c) = Cosigned By    Initials Name Provider Type    Lisseth Strong, PT Physical Therapist               Outcome Measures     Row Name 03/28/23 1647 03/28/23 1535       How much help from another person do you currently need...    Turning from your back to your side while in flat bed without using bedrails? 3  -JALEN 2  -SK    Moving from lying on back to sitting on the side of a flat bed without bedrails? 3  -JALEN 2  -SK    Moving to and from a bed to a chair (including a wheelchair)? 3  -JALEN 2  -SK    Standing up from a chair using your arms (e.g., wheelchair, bedside chair)? 3  -JALEN 2  -SK    Climbing 3-5 steps with a railing? 2  -JALEN 2  -SK    To walk in hospital room? 3  -JALEN 2  -SK    AM-PAC 6 Clicks Score (PT) 17  -JALEN  12  -SK    Highest level of mobility 5 --> Static standing  - 4 --> Transferred to chair/commode  -SK    Row Name 03/28/23 1647 03/28/23 1620       Functional Assessment    Outcome Measure Options AM-PAC 6 Clicks Basic Mobility (PT)  -JALEN AM-PAC 6 Clicks Daily Activity (OT)  -          User Key  (r) = Recorded By, (t) = Taken By, (c) = Cosigned By    Initials Name Provider Type    JALEN Lisseth Browning, PT Physical Therapist    Triny Yancey, OT Occupational Therapist    Fiona Woo RN Registered Nurse                             Physical Therapy Education     Title: PT OT SLP Therapies (In Progress)     Topic: Physical Therapy (In Progress)     Point: Mobility training (In Progress)     Learning Progress Summary           Patient Acceptance, E, NR by  at 3/28/2023 1829                   Point: Home exercise program (Not Started)     Learner Progress:  Not documented in this visit.          Point: Body mechanics (In Progress)     Learning Progress Summary           Patient Acceptance, E, NR by  at 3/28/2023 1829                   Point: Precautions (In Progress)     Learning Progress Summary           Patient Acceptance, E, NR by  at 3/28/2023 1829                               User Key     Initials Effective Dates Name Provider Type Discipline     06/16/21 -  Lisseth Browning, PT Physical Therapist PT              PT Recommendation and Plan  Planned Therapy Interventions (PT): balance training, bed mobility training, gait training, home exercise program, patient/family education, ROM (range of motion), stair training, strengthening, transfer training  Plan of Care Reviewed With: patient  Outcome Evaluation: PT evaluation completed. Pt awake, alert, and oriented x4, and agreeable to therapy. Pt transferred supine to sit to supine with min assistance and sit to stand to sit with FWW with min assistance. Patient ambulated with FWW 5ft to HOB with min assistance. Patient sat EOB prior to ambulation  15 mintues with SBA. After ambulation patient reporting some lightheadedness which resolved once patient returned to supine. O2 sats remained in 90's on 2lpm. Function limited by decreased strength, range, balance, and tolerance for functional mobility and activities s/p R fractured hip surgery. Pt will benefit from PT to gradually regain lost function. Anticipate patient would benefit from additional rehab prior to return home. Patient would benefit from ARU.     Time Calculation:    PT Charges     Row Name 03/28/23 1829             Time Calculation    Start Time 1647  -JALEN      Stop Time 1741  -JALEN      Time Calculation (min) 54 min  -JALEN      PT Received On 03/28/23  -      PT Goal Re-Cert Due Date 04/10/23  -         Time Calculation- PT    Total Timed Code Minutes- PT 0 minute(s)  -JALEN         Untimed Charges    PT Eval/Re-eval Minutes 54  -JALEN         Total Minutes    Untimed Charges Total Minutes 54  -JALEN       Total Minutes 54  -JALEN            User Key  (r) = Recorded By, (t) = Taken By, (c) = Cosigned By    Initials Name Provider Type     Lisseth Browning, PT Physical Therapist              Therapy Charges for Today     Code Description Service Date Service Provider Modifiers Qty    89963677900 HC PT EVAL MOD COMPLEXITY 4 3/28/2023 Lisseth Browning, PT GP 1          PT G-Codes  Outcome Measure Options: AM-PAC 6 Clicks Basic Mobility (PT)  AM-PAC 6 Clicks Score (PT): 17  AM-PAC 6 Clicks Score (OT): 15  PT Discharge Summary  Anticipated Discharge Disposition (PT): inpatient rehabilitation facility    Lisseth Browning PT  3/28/2023

## 2023-03-28 NOTE — ANESTHESIA PROCEDURE NOTES
Airway  Urgency: elective    Date/Time: 3/28/2023 12:02 PM  End Time:3/28/2023 12:02 PM  Airway not difficult    General Information and Staff    Patient location during procedure: OR  Anesthesiologist: Marv Wick MD  SRNA: Nneka Morgan SRNA  Indications and Patient Condition  Indications for airway management: airway protection    Preoxygenated: yes  MILS maintained throughout  Mask difficulty assessment: 0 - not attempted    Final Airway Details  Final airway type: supraglottic airway      Successful airway: I-gel  Size 4     Number of attempts at approach: 1  Assessment: lips, teeth, and gum same as pre-op

## 2023-03-28 NOTE — CONSULTS
[facility]   Orthopedic Consult Note    Patient Name: Rosaura Salgado  : 1940  MRN: [id number]  Primary Care Physician: Vitaliy Kerr MD  Referring Physician: Gilberto Perez MD  Date of admission: 3/27/2023    Orthopedics (on-call MD unless specified)  Consult performed by: Juan Carlos Jesus MD  Consult ordered by: Gilberto Perez MD  Reason for consult: right hip pain        Subjective   Subjective     Reason for Consult/ Chief Complaint: Right hip pain    History of Present Illness  Patient is an 82-year-old female who states that she was outside her house and bent over to pick something up.  She lost her balance and fell landing on her right side.  She had immediate pain and was unable to stand and unable to bear weight.  She was eventually able to get the attention of a family member who is in the house and was then able to get attention.  She was brought to the emergency department where she was found to have a displaced femoral neck fracture.  Consult was obtained for evaluation management of her injury.  She has known history of thrombocytopenia, hypertension, PVD, primary biliary cirrhosis, and coronary artery disease.  She denies any loss of consciousness and denies any other bony injury.    Review of Systems   Constitutional: Negative for chills and fever.   Respiratory: Negative.    Cardiovascular: Negative.    Gastrointestinal: Negative.    Musculoskeletal:        Right hip pain   All other systems reviewed and are negative.       Personal History     Past Medical History:   Diagnosis Date   • Allergic rhinitis    • Coronary atherosclerosis of native coronary artery    • Depression    • Fibromyalgia    • GERD (gastroesophageal reflux disease)    • Hyperlipidemia    • Hypertension    • On anticoagulant therapy    • Peptic ulcer    • Peripheral vascular disease, unspecified (HCC)    • Pneumonia    • Pulmonary embolus (HCC)        Past Surgical History:   Procedure Laterality  Date   • CAROTID ENDARTERECTOMY Left    • COLONOSCOPY N/A 2/1/2019    Procedure: COLONOSCOPY;  Surgeon: Maurice Carmona DO;  Location: [facility] ENDOSCOPY;  Service: Gastroenterology   • ENDOSCOPY N/A 2/1/2019    Procedure: ESOPHAGOGASTRODUODENOSCOPY;  Surgeon: Maurice Carmona DO;  Location: [facility] ENDOSCOPY;  Service: Gastroenterology   • KNEE SURGERY         Family History: family history includes COPD in her mother; Cancer in an other family member; Other in her father; Thyroid disease in an other family member. Otherwise pertinent FHx was reviewed and not pertinent to current issue.    Social History:  reports that she has quit smoking. She has never used smokeless tobacco. She reports that she does not drink alcohol and does not use drugs.    Home Medications:  HYDROcodone-acetaminophen, Vitamin D, aspirin, carvedilol, multivitamin with minerals, pantoprazole, potassium gluconate, tiZANidine, and traMADol      Allergies:  No Known Allergies    Objective    Objective   Vitals:  Temp:  [97.6 °F (36.4 °C)-99.7 °F (37.6 °C)] 99.7 °F (37.6 °C)  Heart Rate:  [50-88] 79  Resp:  [16-18] 18  BP: (145-232)/(59-95) 200/82    Physical Exam  Vitals reviewed.   Constitutional:       General: She is not in acute distress.     Appearance: Normal appearance. She is well-developed.   Eyes:      Conjunctiva/sclera: Conjunctivae normal.      Pupils: Pupils are equal, round, and reactive to light.   Neck:      Trachea: No tracheal deviation.   Cardiovascular:      Rate and Rhythm: Normal rate and regular rhythm.      Heart sounds: Normal heart sounds.   Pulmonary:      Effort: Pulmonary effort is normal.      Breath sounds: Normal breath sounds.   Chest:      Chest wall: No tenderness.   Abdominal:      General: Bowel sounds are normal. There is no distension.      Palpations: Abdomen is soft. There is no mass.      Tenderness: There is no abdominal tenderness.   Musculoskeletal:      Cervical back: Neck supple. No muscular  tenderness.      Comments: Right lower extremity:  The right leg is shortened and externally rotated.  She has good distal pulses.  She has pain with any attempted motion of the hip.  She is able to actively plantarflex and dorsiflex her right ankle.  Range of motion of the hip, strength, and stability is deferred due to known fracture.   Lymphadenopathy:      Cervical: No cervical adenopathy.   Skin:     General: Skin is warm.      Findings: No rash.   Neurological:      Mental Status: She is alert and oriented to person, place, and time.   Psychiatric:         Behavior: Behavior normal.         Thought Content: Thought content normal.         Judgment: Judgment normal.         Result Review    Result Review:  I have personally reviewed the results from the time of this admission to 3/27/2023 23:17 CDT and agree with these findings:  [x]  Laboratory  []  Microbiology  [x]  Radiology  []  EKG/Telemetry   []  Cardiology/Vascular   []  Pathology  [x]  Old records  []  Other:  Most notable findings include:   XR Shoulder 2+ View Left    Result Date: 3/27/2023  Narrative: EXAM: XR SHOULDER 2 OR MORE VIEWS LEFT HISTORY: Left shoulder pain. COMPARISON: None FINDINGS: Mineralization: Osseous demineralization Bones: No visualized fracture. Degenerative changes at the acromioclavicular and glenohumeral joint. Other: Nodular density left midlung     Impression: No visualized fracture. Nodular density left midlung. Correlate with dedicated chest x-ray Electronically signed by:  Mo Patton DO  3/27/2023 10:04 AM CDT Workstation: [id number]    XR Humerus Left    Result Date: 3/27/2023  Narrative: PROCEDURE: Left humerus x-rays with 2 views. INDICATION: Trauma. Fall. COMPARISON: None. FINDINGS: No fracture or acute osseous abnormality in the left humerus. No soft tissue abnormality. The left shoulder and left elbow also appear to be intact.     Impression: Negative left humerus. [id number] Electronically signed by:  Jareth  Anne CASTRO  3/27/2023 10:07 AM CDT Workstation: [id number]    XR Femur 2 View Right    Result Date: 3/27/2023  Narrative: PROCEDURE: Right femur x-rays with 2 views. INDICATION: Trauma. Fall. COMPARISON: Right hip x-ray same date. FINDINGS: Impacted subcapital fracture right hip with cephalad displacement and varus angulation. Femur distal to this down to the knee is intact with no associated fractures distally.     Impression: Impacted subcapital fracture right hip detailed above. Remainder right femur intact. [id number] Electronically signed by:  Jareth Rodríguez MD  3/27/2023 10:13 AM CDT Workstation: [id number]    CT Head Without Contrast    Result Date: 3/27/2023  Narrative: EXAM: Head CT without contrast. CLINICAL INDICATION: Head trauma trauma COMPARISON: No relevant priors available TECHNIQUE:  5 mm slice thickness images (vertex to skull base) No intravenous contrast administered. Coronal/sagittal reformations were employed. All CT scans at this facility use dose modulation, iterative reconstruction, and/or weight based dosing when appropriate to reduce radiation dose to as low as reasonably achievable. FINDINGS:  There is mild global parenchymal volume loss. There is no intraparenchymal or intraventricular hemorrhage. There is no intra-axial mass or extra-axial fluid collection. There is no obvious midline shift or mass effect.  There is patchy periventricular hypoattenuation. The pituitary gland appears normal. There is evidence of cataract surgery. There is subperiosteal mucosal thickening of the sphenoid sinuses. Bilateral mastoid air cells our clear. There is no calvarial or scalp soft tissue abnormality.     Impression: CONCLUSION: 1. No acute intracranial abnormality identified. 2. Age-related atrophy and microvascular changes. Electronically signed by:  Robert Sow DO  3/27/2023 11:14 AM CDT Workstation: [id number]    CT Cervical Spine Without Contrast    Result Date: 3/27/2023  Narrative: EXAM: CT  cervical spine without contrast CLINICAL INDICATION: Trauma, injury COMPARISON: No relevant priors available TECHNIQUE: 2 mm axial slice thickness images cervical spine No intravenous contrast was utilized for exam All CT scans at this facility use dose modulation, iterative reconstruction, and/or weight based dosing when appropriate to reduce radiation dose to as low as reasonably achievable. FINDINGS: The visualized brain parenchyma is normal. There is subperiosteal mucosal thickening of the sphenoid sinuses. The bilateral mastoid air cells well pneumatized. The neck soft tissues are symmetric without hematoma, laceration or subcutaneous gas. There are surgical clips in the region of left upper neck. There are paraseptal emphysematous changes and areas of biapical intralobular septal thickening. There is calcific pleural/parenchymal scarring . No pleural effusion within the field-of-view. There is normal cervical lordosis without obvious fracture, subluxation or aggressive/destructive change. There is no evidence of traumatic distraction injury. There is multilevel spondylitic change, endplate sclerosis and multilevel disc space narrowing. Moderate to severe narrowing at C6-C7. There is multilevel facet and uncovertebral joint hypertrophy. There is no prevertebral soft tissue swelling/edema.     Impression: CONCLUSION: 1. Cervical degenerative changes without fracture. 2. Chronic biapical edema along thickening which may represent areas of pulmonary edema or atelectasis. Electronically signed by:  Robert Sow DO  3/27/2023 11:22 AM CDT Workstation: [id number]    US Liver    Result Date: 3/1/2023  Narrative: Cirrhosis Ultrasound liver: The aorta has a diameter of 1.4 cm . No abnormality is seen in the pancreatic head or neck . There are gallstones . The common duct measures 9 mm which is unchanged from June 2021 The liver is small and has a mildly coarsened echo pattern that is homogeneous, no focal abnormality .  The echo pattern is unchanged from 2 years ago The spleen measures 16.5 cm, no ascites; the spleen measured 12 cm June 2021    Impression: 1. The mildly coarsened homogeneous echo pattern of the liver is unchanged from the prior study and no focal abnormality 2. The spleen measured 12 cm in June 2021 and now measures 16 cm . No ascites [id number]    XR Hip With or Without Pelvis 2 - 3 View Right    Result Date: 3/27/2023  Narrative: PROCEDURE: Pelvis and right hip x-rays with 3 views. INDICATION: Trauma. Fall. COMPARISON: None. FINDINGS: There is a subcapital fracture of the right hip with impaction and cephalad displacement as well as varus angulation. Femoral head normally aligned with the acetabulum. No other fractures evident in the bony pelvis or left hip. Degenerative arthritic changes in the left hip. Incidental finding of a 2.9-1.2 cm laminated gallbladder calculi right mid abdomen.     Impression: Subcapital right hip fracture with impaction and mild cephalad displacement with varus angulation. Couple laminated gallbladder calculi detailed above present on previous CT abdomen 2018. [id number] Electronically signed by:  Jareth Rodríguez MD  3/27/2023 10:11 AM CDT Workstation: [id number]    Results from last 7 days   Lab Units 03/27/23  0919   WBC 10*3/mm3 2.40*   HEMOGLOBIN g/dL 10.0*   HEMATOCRIT % 30.3*   PLATELETS 10*3/mm3 40*     Results from last 7 days   Lab Units 03/27/23 2113 03/27/23 0919   SODIUM mmol/L  --  139   POTASSIUM mmol/L 3.4* 2.8*   CHLORIDE mmol/L  --  107   CO2 mmol/L  --  23.0   BUN mg/dL  --  11   CREATININE mg/dL  --  1.10*   CALCIUM mg/dL  --  8.0*   BILIRUBIN mg/dL  --  1.0   ALK PHOS U/L  --  385*   ALT (SGPT) U/L  --  30   AST (SGOT) U/L  --  46*   GLUCOSE mg/dL  --  126*     Results from last 7 days   Lab Units 03/27/23  0919   INR  1.23*         Assessment & Plan   Assessment / Plan         Active Hospital Problems:  Active Hospital Problems    Diagnosis    • **Traumatic  closed displaced fracture of neck of right femur, initial encounter (HCC)    • Thrombocytopenia (HCC)    • Carotid stenosis, asymptomatic, bilateral    • Essential hypertension    • Primary biliary cirrhosis    • Coronary atherosclerosis of native coronary artery        Plan:     Rosaura Salgado is a 82 y.o. female who has a displaced right femoral neck fracture.  Long discussion was held the patient regarding further treatment options.  We discussed that this is a fracture of necessity and requires operative intervention.  We discussed proceeding with surgical intervention relatively quickly returned her to a mobile state and allows for physical therapy and improved mobility.    We discussed that she is at high risk due to her thrombocytopenia, carotid artery stenosis, hypertension, and biliary cirrhosis.  However, her operative risks, although are not insignificant in the acute phase, are better than the risks with nonoperative treatment.  Proceeding with surgical intervention so that she can be mobilized in bed and return to mobile active status is warranted.  Patient conveys understanding of this and is in agreement to proceed.    The patient voiced understanding of the risks, benefits, and alternative forms of treatment that were discussed and the patient consents to proceed with surgery.  All risks, benefits and alternatives were discussed.  Risks include, but not exclusive to anesthetic complications, including death, MI, CVA, infection, bleeding DVT, fracture, residual pain and need for future surgery.    This discussion was held with the patient by Juan Carlos Jesus MD and all questions were answered.    Plan RIGHT HIP BIPOLAR ANTERIOR APPROACH (Right)    Due to thrombocytopenia, we will have platelets available to start at the beginning of surgical intervention.    Electronically signed by Juan Carlos Jesus MD, 03/27/23, 11:17 PM CDT.

## 2023-03-28 NOTE — PLAN OF CARE
Goal Outcome Evaluation:  Plan of Care Reviewed With: patient           Outcome Evaluation: OT eval complete, supine in bed, alert x 4, very plesant and agreeable for evaluation. Supine<>sit with min A. Sit to stand, BSC t/f with min A and RW. LE dressing dependent (socks), toileting with mod A, grooming set up. Returned to bed. Minimal c/o of pain, BP start of session 135/74, end of session post activity 197/81. Patient tolerated well. Patient with decreased balance,  UE strength, decreased safety in transfers, and decreased independence in ADLs. Cont inpatient OT. Patient would benefit from ARU prior to d/c home.

## 2023-03-28 NOTE — ANESTHESIA POSTPROCEDURE EVALUATION
Patient: Rosaura Salgado    Procedure Summary     Date: 03/28/23 Room / Location: [facility] OR [address] [place] [address] OR    Anesthesia Start: 1149 Anesthesia Stop: 1354    Procedure: RIGHT HIP BIPOLAR ANTERIOR APPROACH (Right: Hip) Diagnosis:       Closed fracture of neck of right femur, initial encounter (HCC)      Thrombocytopenia (HCC)      Traumatic closed displaced fracture of neck of right femur, initial encounter (HCC)      Carotid stenosis, asymptomatic, bilateral      Primary biliary cholangitis (HCC)      Essential hypertension      Atherosclerosis of native coronary artery of native heart without angina pectoris      (Closed fracture of neck of right femur, initial encounter ([facility]) [S72.001A])      (Thrombocytopenia (HCC) [D69.6])      (Traumatic closed displaced fracture of neck of right femur, initial encounter ([facility]) [S72.001A])      (Carotid stenosis, asymptomatic, bilateral [I65.23])      (Primary biliary cholangitis (HCC) [K74.3])      (Essential hypertension [I10])      (Atherosclerosis of native coronary artery of native heart without angina pectoris [I25.10])    Surgeons: Juan Carlos Jesus MD Provider: Marv Wick MD    Anesthesia Type: general ASA Status: 4          Anesthesia Type: general    Vitals  No vitals data found for the desired time range.          Post Anesthesia Care and Evaluation    Patient location during evaluation: PACU  Patient participation: complete - patient participated  Level of consciousness: awake and awake and alert  Pain management: satisfactory to patient    Airway patency: patent  Anesthetic complications: No anesthetic complications  PONV Status: none  Cardiovascular status: acceptable and stable  Respiratory status: acceptable, room air and spontaneous ventilation  Hydration status: acceptable    Comments: 97.3, 200/84, 71, 13, 94%

## 2023-03-28 NOTE — PLAN OF CARE
Goal Outcome Evaluation:           Progress: improving  Outcome Evaluation: pt tolerated right hip bipolar approch well today. VSS. IVF infusing. daughter updated.

## 2023-03-28 NOTE — PLAN OF CARE
Problem: Adult Inpatient Plan of Care  Goal: Plan of Care Review  Recent Flowsheet Documentation  Taken 3/28/2023 1647 by Lisseth Browning PT  Plan of Care Reviewed With: patient  Outcome Evaluation: PT evaluation completed. Pt awake, alert, and oriented x4, and agreeable to therapy. Pt transferred supine to sit to supine with min assistance and sit to stand to sit with FWW with min assistance. Patient ambulated with FWW 5ft to HOB with min assistance. Patient sat EOB prior to ambulation 15 mintues with SBA. After ambulation patient reporting some lightheadedness which resolved once patient returned to supine. O2 sats remained in 90's on 2lpm. Function limited by decreased strength, range, balance, and tolerance for functional mobility and activities s/p R fractured hip surgery. Pt will benefit from PT to gradually regain lost function. Anticipate patient would benefit from additional rehab prior to return home. Patient would benefit from ARU.   Goal Outcome Evaluation:  Plan of Care Reviewed With: patient           Outcome Evaluation: PT evaluation completed. Pt awake, alert, and oriented x4, and agreeable to therapy. Pt transferred supine to sit to supine with min assistance and sit to stand to sit with FWW with min assistance. Patient ambulated with FWW 5ft to HOB with min assistance. Patient sat EOB prior to ambulation 15 mintues with SBA. After ambulation patient reporting some lightheadedness which resolved once patient returned to supine. O2 sats remained in 90's on 2lpm. Function limited by decreased strength, range, balance, and tolerance for functional mobility and activities s/p R fractured hip surgery. Pt will benefit from PT to gradually regain lost function. Anticipate patient would benefit from additional rehab prior to return home. Patient would benefit from ARU.

## 2023-03-28 NOTE — ANESTHESIA PREPROCEDURE EVALUATION
" Anesthesia Evaluation     Patient summary reviewed and Nursing notes reviewed   no history of anesthetic complications:  NPO Solid Status: > 8 hours  NPO Liquid Status: > 2 hours           Airway   Mallampati: II  TM distance: >3 FB  Neck ROM: full  possible difficult intubation  Dental    (+) poor dentation and upper dentures    Pulmonary    (+) pulmonary embolism, a smoker Former, COPD moderate, decreased breath sounds,   (-) shortness of breath    PE comment: Albuterol treatment ordered pre-op.  Cardiovascular     ECG reviewed  Rhythm: regular  Rate: normal    (+) hypertension, CAD, cardiac stents (Coronary one stent 2014. Reports no problems since.) PVD, DVT, hyperlipidemia,  carotid artery disease carotid bilateral  (-) angina, MIRELES, murmur, carotid bruits    ROS comment: n-specific st t changes  Abnormal ECG  When compared with ECG of 13-DEC-2018 18:43,  Vent. rate has decreased BY  31 BPM  Nonspecific T wave abnormality, improved in Anterior leads     Referred By:            Confirmed By: GOGO HAAS    Neuro/Psych  (+) psychiatric history Depression,    GI/Hepatic/Renal/Endo    (+)  GERD,      Musculoskeletal     Abdominal    Substance History - negative use     OB/GYN negative ob/gyn ROS         Other   arthritis,      ROS/Med Hx Other: OD with erythema denies from fall causing hip fracture. \"Tripped and fell\". Platelet count 85K.                  Anesthesia Plan    ASA 4     general     intravenous induction     Anesthetic plan, risks, benefits, and alternatives have been provided, discussed and informed consent has been obtained with: patient.  Pre-procedure education provided  Use of blood products discussed with patient  Consented to blood products.   Plan discussed with CRNA.        CODE STATUS:    Code Status (Patient has no pulse and is not breathing): CPR (Attempt to Resuscitate)  Medical Interventions (Patient has pulse or is breathing): Full Support      "

## 2023-03-28 NOTE — CONSULTS
Adult Nutrition  Assessment/PES    Patient Name:  Rosaura Salgado  YOB: 1940  MRN: [id number]  Admit Date:  3/27/2023    Assessment Date:  3/28/2023    Comments:  Pt admitted w/ fracture of right femur. PMH of CAD, cirrhosis, HTN, and PVD. RDN seeing pt r/t fracture. Denies food allergies, difficulty chewing/swallowing, n/v, and changes to normal BM. Noted poor dentition but pt reports this does not affect her ability to eat. Pt states she has always been thin and use to weight 95# but has gradually gained in older age. Per epic, wt 2/2019 109#, # w/ BMI 18.64. Pt states that she has a great appetite but then reports that she generally is 'not a big eater and does not eat much'. Lives with her daughter who helps prepare meals. NPFE performed to assess physical status. Pt meets ASPEN criteria for moderate chronic malnutrition r/t physical findings of muscle wasting and fat loss. Could be related to chronic disease state or increased age. Pt currently NPO in preparation of surgical procedure for hip fracture. Pt has increased needs. Agrees to whole milk once diet is advanced. Will update accordingly. RDN staff to monitor PO intake, wt trend, and POC.     Reason for Assessment     Row Name 03/28/23 1151          Reason for Assessment    Reason For Assessment identified at risk by screening criteria (P)      Diagnosis trauma (P)      Identified At Risk by Screening Criteria BMI;MST SCORE 2+ (P)                 Nutrition/Diet History     Row Name 03/28/23 1217          Nutrition/Diet History    Typical Intake (Food/Fluid/EN/PN) Pt states she doesnt normally eat very much but she has a great appetite. Lives with her daughter who helps prepare meals. (P)                 Labs/Tests/Procedures/Meds     Row Name 03/28/23 1217          Labs/Procedures/Meds    Lab Results Reviewed reviewed, pertinent (P)      Lab Results Comments Na 135, Glu 126, mg 1.4, Alb 2.9, total bilirubin 2.3 (P)          Medications    Pertinent Medications Comments IVF, vitamin D, multivitamin. (P)                   Estimated/Assessed Needs - Anthropometrics     Row Name 03/28/23 1218          Anthropometrics    Weight for Calculation 56.7 kg (125 lb) (P)   #        Estimated/Assessed Needs    Additional Documentation Protein Requirements (Group);KCAL/KG (Group);Fluid Requirements (Group) (P)         KCAL/KG    KCAL/KG 25 Kcal/Kg (kcal) (P)      25 Kcal/Kg (kcal) 1417.5 (P)         Protein Requirements    Weight Used For Protein Calculations 56.7 kg (125 lb) (P)      Est Protein Requirement Amount (gms/kg) 1.1 gm protein (P)      Estimated Protein Requirements (gms/day) 62.37 (P)         Fluid Requirements    Fluid Requirements (mL/day) 1500 (P)      RDA Method (mL) 1500 (P)                 Nutrition Prescription Ordered     Row Name 03/28/23 1219          Nutrition Prescription PO    Current PO Diet NPO (P)                   Malnutrition Severity Assessment     Row Name 03/28/23 1219          Malnutrition Severity Assessment    Malnutrition Type Chronic Disease - Related Malnutrition (P)         Insufficient Energy Intake     Insufficient Energy Intake Findings -- (P)   No data available.        Unintentional Weight Loss     Unintentional Weight Loss Findings None (P)         Muscle Loss    Loss of Muscle Mass Findings Severe (P)      Latter day Region Severe - deep hollowing/scooping, lack of muscle to touch, facial bones well defined (P)      Clavicle Bone Region Severe - protruding prominent bone (P)      Acromion Bone Region Severe - squared shoulders, bones, and acromion process protrusion prominent (P)      Scapular Bone Region Severe - prominent bones, depressions easily visible between ribs, scapula, spine, shoulders (P)      Dorsal Hand Region Moderate - slight depression (P)      Patellar Region -- (P)   mild     Anterior Thigh Region -- (P)   mild     Posterior Calf Region -- (P)   Unable to assess d/t medical  equipment        Fat Loss    Subcutaneous Fat Loss Findings Moderate (P)      Orbital Region  Moderate -  somewhat hollowness, slightly dark circles (P)      Upper Arm Region Severe - mostly skin, very little space between folds, fingers touch (P)      Thoracic & Lumbar Region Moderate - ribs visible with mild depressions, iliac crest somewhat prominent (P)         Fluid Accumulation (Edema)    Fluid Acumulation Findings -- (P)   None        Criteria Met (Must meet criteria for severity in at least 2 of these categories: M Wasting, Fat Loss, Fluid, Secondary Signs, Wt. Status, Intake)    Patient meets criteria for  Moderate (non-severe) Malnutrition (P)                  Problem/Interventions:   Problem 1     Row Name 03/28/23 1223          Nutrition Diagnoses Problem 1    Problem 1 Malnutrition (P)      Etiology (related to) Other (comment) (P)   inadequate intake and/or increased energy needs and/or chronic disease state.     Signs/Symptoms (evidenced by) BMI;Other (comment) (P)   NFPE physical findings.     BMI 18 - 18.9 (P)                 Problem 2     Row Name 03/28/23 1226          Nutrition Diagnoses Problem 2    Problem 2 Increased Nutrient Needs (P)      Macronutrient Kcal;Protein (P)      Etiology (related to) Medical Diagnosis (P)      Ortho Fracture (P)      Signs/Symptoms (evidenced by) NPO;BMI;Report of Minimal PO Intake (P)      BMI 18 - 18.9 (P)                     Intervention Goal     Row Name 03/28/23 1226          Intervention Goal    General Maintain nutrition;Reduce/improve symptoms;Meet nutritional needs for age/condition (P)      PO Initiate feeding;Increase intake;Meet estimated needs (P)      Weight Maintain weight (P)                 Nutrition Intervention     Row Name 03/28/23 1227          Nutrition Intervention    RD/Tech Action Follow Tx progress;Care plan reviewd;Encourage intake;Await begin PO (P)                   Education/Evaluation     Row Name 03/28/23 1223          Education     Education Provided education regarding;Education topics (P)      Provided education regarding Diet rationale (P)      Education Topics Basic nutrition;Weight management - maintain;Protein (P)         Monitor/Evaluation    Monitor Per protocol;PO intake;Weight;Skin status;Symptoms (P)                  Electronically signed by:  Sophie Prater  03/28/23 12:28 CDT

## 2023-03-28 NOTE — PROGRESS NOTES
[facility] Medicine Services  INPATIENT PROGRESS NOTE      Length of Stay: 0  Date of Admission: 3/27/2023  Primary Care Physician: Vitaliy Kerr MD    Subjective   Chief Complaint: Hip pain  HPI: Patient seen and examined postoperatively, doing well with no current complaints.    Review of Systems   All pertinent negatives and positives are as above. All other systems have been reviewed and are negative unless otherwise stated.     Objective    As of today 03/28/23  Temp:  [97 °F (36.1 °C)-99.7 °F (37.6 °C)] 97.5 °F (36.4 °C)  Heart Rate:  [64-88] 67  Resp:  [16-20] 20  BP: (170-232)/(62-95) 170/78    Physical Exam  Constitutional:       General: She is not in acute distress.     Appearance: She is not toxic-appearing.   HENT:      Head: Normocephalic and atraumatic.      Right Ear: External ear normal.      Left Ear: External ear normal.      Nose: Nose normal.      Mouth/Throat:      Pharynx: Oropharynx is clear.   Eyes:      Conjunctiva/sclera: Conjunctivae normal.   Cardiovascular:      Rate and Rhythm: Normal rate and regular rhythm.      Heart sounds: Normal heart sounds.   Pulmonary:      Effort: Pulmonary effort is normal.      Breath sounds: Normal breath sounds.   Abdominal:      General: Bowel sounds are normal.      Palpations: Abdomen is soft.      Tenderness: There is no abdominal tenderness.   Musculoskeletal:         General: No deformity.   Skin:     General: Skin is warm and dry.      Capillary Refill: Capillary refill takes less than 2 seconds.   Neurological:      General: No focal deficit present.      Mental Status: She is alert and oriented to person, place, and time. Mental status is at baseline.   Psychiatric:         Behavior: Behavior normal.           Results Review:  I have reviewed the labs, radiology results, and diagnostic studies.    Laboratory Data:   Results from last 7 days   Lab Units 03/28/23  0548 03/27/23  1733  03/27/23  0919   SODIUM mmol/L 135*  --  139   POTASSIUM mmol/L 4.1 3.4* 2.8*   CHLORIDE mmol/L 107  --  107   CO2 mmol/L 15.0*  --  23.0   BUN mg/dL 12  --  11   CREATININE mg/dL 0.98  --  1.10*   GLUCOSE mg/dL 126*  --  126*   CALCIUM mg/dL 8.4*  --  8.0*   BILIRUBIN mg/dL 2.3*  --  1.0   ALK PHOS U/L 395*  --  385*   ALT (SGPT) U/L 30  --  30   AST (SGOT) U/L 47*  --  46*   ANION GAP mmol/L 13.0  --  9.0     Estimated Creatinine Clearance: 36.1 mL/min (by C-G formula based on SCr of 0.98 mg/dL).  Results from last 7 days   Lab Units 03/28/23  0110 03/27/23  0919   MAGNESIUM mg/dL 1.4* 1.7         Results from last 7 days   Lab Units 03/28/23  0548 03/27/23  0919   WBC 10*3/mm3 10.54 2.40*   HEMOGLOBIN g/dL 13.0 10.0*   HEMATOCRIT % 39.4 30.3*   PLATELETS 10*3/mm3 85* 40*     Results from last 7 days   Lab Units 03/27/23  0919   INR  1.23*       Culture Data:   No results found for: BLOODCX  No results found for: URINECX  No results found for: RESPCX  No results found for: WOUNDCX  No results found for: STOOLCX  No components found for: BODYFLD    Radiology Data:   Imaging Results (Last 24 Hours)     Procedure Component Value Units Date/Time    FL C Arm During Surgery [[id number]] Resulted: 03/28/23 1337     Updated: 03/28/23 1337          I have reviewed the patient's current medications.     Assessment/Plan     Principal Problem:    Traumatic closed displaced fracture of neck of right femur, initial encounter ([facility])  Active Problems:    Coronary atherosclerosis of native coronary artery    Essential hypertension    Primary biliary cirrhosis    Carotid stenosis, asymptomatic, bilateral    Thrombocytopenia (HCC)    Closed fracture of neck of right femur, initial encounter ([facility])    Plan:  - Continue routine postop care, appreciate orthopedic surgery assistance  - PT and OT already consulted  - Thrombocytopenia improved today, continue to monitor for any signs of worsening.  - Continue home medications as  appropriate  - Depending on how she does with therapy she may benefit from ARU versus rehab versus home with home health.  - DVT prophylaxis with SCDs  - CODE STATUS: Full    Medical Decision Making  Number and Complexity of problems: 4 high complexity    Conditions and Status:        Condition is improving.     [facility] Data  External documents reviewed: Previous records    Tests considered but not ordered: None     Decision rules/scores evaluated (example [id number], Wells, etc): None     Discussed with: Patient     Treatment Plan  As above    Care Planning  Shared decision making: Patient  Code status and discussions: Full    Disposition  Social Determinants of Health that impact treatment or disposition: None  I expect the patient to be discharged to ARU versus rehab versus home with home health in 1-2 days.       I have utilized all available immediate resources to obtain, update, or review the patient's current medications (including all prescriptions, over-the-counter products, herbals, cannabis/cannabidiol products, and vitamin/mineral/dietary (nutritional) supplements).   I confirmed that the patient's Advance Care Plan is present, code status is documented, or surrogate decision maker is listed in the patient's medical record.      Marty Gloria MD

## 2023-03-28 NOTE — PROGRESS NOTES
ORTHOPEDIC PROGRESS NOTE:    Name:  Rosaura Salgado  Date:    3/28/2023  Date of admission:  3/27/2023      Subjective:  No new complaints.  Pain in right hip  No fever or chills      Vitals:     Vitals:    23 0354   BP: (!) 200/62   Pulse: 79   Resp: 18   Temp: 98.7 °F (37.1 °C)   SpO2: 90%      Temp (24hrs), Av.6 °F (37 °C), Min:97.6 °F (36.4 °C), Max:99.7 °F (37.6 °C)      Exam:  Awake and alert  Toes up and down   Calves soft and nontender  Good distal pulses and sensation.    Results from last 7 days   Lab Units 23  0548 23  0919   WBC 10*3/mm3 10.54 2.40*   HEMOGLOBIN g/dL 13.0 10.0*   HEMATOCRIT % 39.4 30.3*   PLATELETS 10*3/mm3 85* 40*         ASSESSMENT:  Active Hospital Problems    Diagnosis  POA   • **Traumatic closed displaced fracture of neck of right femur, initial encounter ([facility]) [S72.001A]  Yes   • Thrombocytopenia ([facility]) [D69.6]  Yes     Added automatically from request for surgery [id number]     • Carotid stenosis, asymptomatic, bilateral [I65.23]  Yes   • Essential hypertension [I10]  Yes   • Primary biliary cirrhosis [K74.3]  Yes   • Coronary atherosclerosis of native coronary artery [I25.10]  Yes         PLAN:    Thrombocytopenia improved, 2 units of platelets ordered for the OR.  All questions answered.  Plan OR today.  PT/OT postop  Will discuss DVT prophylaxis with primary team postop.      23 at 07:48 CDT by Juan Carlos Jesus MD

## 2023-03-28 NOTE — PLAN OF CARE
Goal Outcome Evaluation:  Plan of Care Reviewed With: patient        Progress: no change  Outcome Evaluation: Patient vital signs have exhibited hypertension most of this shift requiring notifying MD and gettin PRN meds ordered. Patient's K+ level was checked this shift after replacement on day shift and noted to still require replacement. Magnesium was also noted low and replacement for that was also initiated. No other acute changes this shift.

## 2023-03-29 PROBLEM — E44.0 MODERATE MALNUTRITION: Status: ACTIVE | Noted: 2023-03-29

## 2023-03-29 LAB
ALBUMIN SERPL-MCNC: 2.9 G/DL (ref 3.5–5.2)
ALBUMIN/GLOB SERPL: 0.8 G/DL
ALP SERPL-CCNC: 311 U/L (ref 39–117)
ALT SERPL W P-5'-P-CCNC: 18 U/L (ref 1–33)
ANION GAP SERPL CALCULATED.3IONS-SCNC: 10 MMOL/L (ref 5–15)
AST SERPL-CCNC: 36 U/L (ref 1–32)
BASOPHILS # BLD AUTO: 0.03 10*3/MM3 (ref 0–0.2)
BASOPHILS NFR BLD AUTO: 0.4 % (ref 0–1.5)
BILIRUB SERPL-MCNC: 1.4 MG/DL (ref 0–1.2)
BUN SERPL-MCNC: 23 MG/DL (ref 8–23)
BUN/CREAT SERPL: 18 (ref 7–25)
CALCIUM SPEC-SCNC: 8.1 MG/DL (ref 8.6–10.5)
CHLORIDE SERPL-SCNC: 108 MMOL/L (ref 98–107)
CO2 SERPL-SCNC: 18 MMOL/L (ref 22–29)
CREAT SERPL-MCNC: 1.28 MG/DL (ref 0.57–1)
DEPRECATED RDW RBC AUTO: 57.4 FL (ref 37–54)
EGFRCR SERPLBLD CKD-EPI 2021: 42.2 ML/MIN/1.73
EOSINOPHIL # BLD AUTO: 0.02 10*3/MM3 (ref 0–0.4)
EOSINOPHIL NFR BLD AUTO: 0.2 % (ref 0.3–6.2)
ERYTHROCYTE [DISTWIDTH] IN BLOOD BY AUTOMATED COUNT: 17.6 % (ref 12.3–15.4)
GLOBULIN UR ELPH-MCNC: 3.5 GM/DL
GLUCOSE SERPL-MCNC: 184 MG/DL (ref 65–99)
HCT VFR BLD AUTO: 32 % (ref 34–46.6)
HGB BLD-MCNC: 10.7 G/DL (ref 12–15.9)
IMM GRANULOCYTES # BLD AUTO: 0.05 10*3/MM3 (ref 0–0.05)
IMM GRANULOCYTES NFR BLD AUTO: 0.6 % (ref 0–0.5)
LYMPHOCYTES # BLD AUTO: 0.83 10*3/MM3 (ref 0.7–3.1)
LYMPHOCYTES NFR BLD AUTO: 10.1 % (ref 19.6–45.3)
MCH RBC QN AUTO: 29.8 PG (ref 26.6–33)
MCHC RBC AUTO-ENTMCNC: 33.4 G/DL (ref 31.5–35.7)
MCV RBC AUTO: 89.1 FL (ref 79–97)
MONOCYTES # BLD AUTO: 0.58 10*3/MM3 (ref 0.1–0.9)
MONOCYTES NFR BLD AUTO: 7 % (ref 5–12)
NEUTROPHILS NFR BLD AUTO: 6.74 10*3/MM3 (ref 1.7–7)
NEUTROPHILS NFR BLD AUTO: 81.7 % (ref 42.7–76)
NRBC BLD AUTO-RTO: 0 /100 WBC (ref 0–0.2)
PLATELET # BLD AUTO: 93 10*3/MM3 (ref 140–450)
PMV BLD AUTO: 11 FL (ref 6–12)
POTASSIUM SERPL-SCNC: 3.7 MMOL/L (ref 3.5–5.2)
PROT SERPL-MCNC: 6.4 G/DL (ref 6–8.5)
RBC # BLD AUTO: 3.59 10*6/MM3 (ref 3.77–5.28)
SODIUM SERPL-SCNC: 136 MMOL/L (ref 136–145)
WBC NRBC COR # BLD: 8.25 10*3/MM3 (ref 3.4–10.8)

## 2023-03-29 PROCEDURE — 99024 POSTOP FOLLOW-UP VISIT: CPT | Performed by: ORTHOPAEDIC SURGERY

## 2023-03-29 PROCEDURE — 97535 SELF CARE MNGMENT TRAINING: CPT

## 2023-03-29 PROCEDURE — 97530 THERAPEUTIC ACTIVITIES: CPT

## 2023-03-29 PROCEDURE — 80053 COMPREHEN METABOLIC PANEL: CPT | Performed by: FAMILY MEDICINE

## 2023-03-29 PROCEDURE — 85025 COMPLETE CBC W/AUTO DIFF WBC: CPT | Performed by: FAMILY MEDICINE

## 2023-03-29 PROCEDURE — 25010000002 ENOXAPARIN PER 10 MG: Performed by: FAMILY MEDICINE

## 2023-03-29 PROCEDURE — 97116 GAIT TRAINING THERAPY: CPT

## 2023-03-29 PROCEDURE — 25010000002 CEFAZOLIN PER 500 MG: Performed by: ORTHOPAEDIC SURGERY

## 2023-03-29 PROCEDURE — 25010000002 HYDRALAZINE PER 20 MG: Performed by: ORTHOPAEDIC SURGERY

## 2023-03-29 PROCEDURE — 97110 THERAPEUTIC EXERCISES: CPT

## 2023-03-29 RX ORDER — ENOXAPARIN SODIUM 100 MG/ML
30 INJECTION SUBCUTANEOUS EVERY 24 HOURS
Status: DISCONTINUED | OUTPATIENT
Start: 2023-03-29 | End: 2023-03-31 | Stop reason: HOSPADM

## 2023-03-29 RX ORDER — AMLODIPINE BESYLATE 5 MG/1
5 TABLET ORAL
Status: DISCONTINUED | OUTPATIENT
Start: 2023-03-29 | End: 2023-03-31 | Stop reason: HOSPADM

## 2023-03-29 RX ADMIN — ENOXAPARIN SODIUM 30 MG: 30 INJECTION SUBCUTANEOUS at 20:33

## 2023-03-29 RX ADMIN — HYDRALAZINE HYDROCHLORIDE 10 MG: 20 INJECTION INTRAMUSCULAR; INTRAVENOUS at 03:32

## 2023-03-29 RX ADMIN — CARVEDILOL 6.25 MG: 6.25 TABLET, FILM COATED ORAL at 17:36

## 2023-03-29 RX ADMIN — CARVEDILOL 6.25 MG: 6.25 TABLET, FILM COATED ORAL at 08:56

## 2023-03-29 RX ADMIN — CEFAZOLIN 2 G: 10 INJECTION, POWDER, FOR SOLUTION INTRAVENOUS at 03:33

## 2023-03-29 RX ADMIN — Medication 1000 UNITS: at 08:56

## 2023-03-29 RX ADMIN — Medication 10 ML: at 20:34

## 2023-03-29 RX ADMIN — ACETAMINOPHEN 650 MG: 325 TABLET ORAL at 19:50

## 2023-03-29 RX ADMIN — DOCUSATE SODIUM 50 MG AND SENNOSIDES 8.6 MG 2 TABLET: 8.6; 5 TABLET, FILM COATED ORAL at 08:56

## 2023-03-29 RX ADMIN — Medication 1 TABLET: at 08:56

## 2023-03-29 RX ADMIN — AMLODIPINE BESYLATE 5 MG: 5 TABLET ORAL at 08:56

## 2023-03-29 NOTE — PLAN OF CARE
Goal Outcome Evaluation:  Plan of Care Reviewed With: patient        Progress: improving  Outcome Evaluation: Pt resting in bed at this time, SBA with transfers, no falls noted, no new skin injuries noted,

## 2023-03-29 NOTE — PLAN OF CARE
Goal Outcome Evaluation:  Plan of Care Reviewed With: patient        Progress: improving  Outcome Evaluation: Pt seated in recliner when LEE arrived. Nsg reports be is wanting to D/C home with daughter. LEE consulted w/ pt and case management regarding D/C plans. Pt performed sit > stand t/f w/ Min A using RW, fxnl mob ~ 5-6 steps to BSC using RW w/ Min A, toilet t/f w/ Min A and toileting w/ Min A for bowel hygiene while standing supported. Pt progressing well with limited pain. Case management reports pts insurance will not cover ARU. Pt not able to go to outpatient therapy secondary to pts daughter not able to drive due to her dx. Pt would most benefit from SNF for continued rehab prior to returning home. Pt very pleasant. Pt seated in recliner w/ all needs met and no s/s of distress. BP elevated pre/post OT tx session. Nsg is aware.

## 2023-03-29 NOTE — PROGRESS NOTES
[facility] Medicine Services  INPATIENT PROGRESS NOTE      Length of Stay: 1  Date of Admission: 3/27/2023  Primary Care Physician: Jin Dumont MD    Subjective   Chief Complaint: Hip pain  HPI: Doing well, pain controlled.    Review of Systems   All pertinent negatives and positives are as above. All other systems have been reviewed and are negative unless otherwise stated.     Objective    As of today 03/29/23  Temp:  [97.7 °F (36.5 °C)-99.3 °F (37.4 °C)] 98 °F (36.7 °C)  Heart Rate:  [68-87] 76  Resp:  [16-18] 18  BP: (127-195)/(58-80) 127/64    Physical Exam  Constitutional:       General: She is not in acute distress.     Appearance: She is not toxic-appearing.   HENT:      Head: Normocephalic and atraumatic.      Right Ear: External ear normal.      Left Ear: External ear normal.      Nose: Nose normal.      Mouth/Throat:      Pharynx: Oropharynx is clear.   Eyes:      Conjunctiva/sclera: Conjunctivae normal.   Cardiovascular:      Rate and Rhythm: Normal rate and regular rhythm.      Heart sounds: Normal heart sounds.   Pulmonary:      Effort: Pulmonary effort is normal.      Breath sounds: Normal breath sounds.   Abdominal:      General: Bowel sounds are normal.      Palpations: Abdomen is soft.      Tenderness: There is no abdominal tenderness.   Musculoskeletal:         General: No deformity.   Skin:     General: Skin is warm and dry.      Capillary Refill: Capillary refill takes less than 2 seconds.   Neurological:      General: No focal deficit present.      Mental Status: She is alert and oriented to person, place, and time. Mental status is at baseline.   Psychiatric:         Behavior: Behavior normal.           Results Review:  I have reviewed the labs, radiology results, and diagnostic studies.    Laboratory Data:   Results from last 7 days   Lab Units 03/29/23  0747 03/28/23  0548 03/27/23  2113 03/27/23  0919   SODIUM mmol/L 136  135*  --  139   POTASSIUM mmol/L 3.7 4.1 3.4* 2.8*   CHLORIDE mmol/L 108* 107  --  107   CO2 mmol/L 18.0* 15.0*  --  23.0   BUN mg/dL 23 12  --  11   CREATININE mg/dL 1.28* 0.98  --  1.10*   GLUCOSE mg/dL 184* 126*  --  126*   CALCIUM mg/dL 8.1* 8.4*  --  8.0*   BILIRUBIN mg/dL 1.4* 2.3*  --  1.0   ALK PHOS U/L 311* 395*  --  385*   ALT (SGPT) U/L 18 30  --  30   AST (SGOT) U/L 36* 47*  --  46*   ANION GAP mmol/L 10.0 13.0  --  9.0     Estimated Creatinine Clearance: 28.4 mL/min (A) (by C-G formula based on SCr of 1.28 mg/dL (H)).  Results from last 7 days   Lab Units 03/28/23  0110 03/27/23  0919   MAGNESIUM mg/dL 1.4* 1.7         Results from last 7 days   Lab Units 03/29/23  0747 03/28/23  0548 03/27/23  0919   WBC 10*3/mm3 8.25 10.54 2.40*   HEMOGLOBIN g/dL 10.7* 13.0 10.0*   HEMATOCRIT % 32.0* 39.4 30.3*   PLATELETS 10*3/mm3 93* 85* 40*     Results from last 7 days   Lab Units 03/27/23  0919   INR  1.23*       Culture Data:   No results found for: BLOODCX  No results found for: URINECX  No results found for: RESPCX  No results found for: WOUNDCX  No results found for: STOOLCX  No components found for: BODYFLD    Radiology Data:   Imaging Results (Last 24 Hours)     ** No results found for the last 24 hours. **          I have reviewed the patient's current medications.     Assessment/Plan     Principal Problem:    Traumatic closed displaced fracture of neck of right femur, initial encounter ([facility])  Active Problems:    Coronary atherosclerosis of native coronary artery    Essential hypertension    Primary biliary cirrhosis    Carotid stenosis, asymptomatic, bilateral    Thrombocytopenia (HCC)    Closed fracture of neck of right femur, initial encounter ([facility])    Moderate malnutrition ([facility])    Plan:  - Continue routine postop care, appreciate orthopedic surgery assistance  - PT and OT already consulted  - Thrombocytopenia improved/stable today, continue to monitor for any signs of worsening.  - Recommendations  reviewed on Up to Date regarding AC with her thrombocytopenia. Okay to give as long as platelet count remains 50K or greater so will start Lovenox.   - Continue home medications as appropriate  - Depending on how she does with therapy she may benefit from ARU versus rehab versus home with home health.  - DVT prophylaxis with Lovenox  - CODE STATUS: Full    Medical Decision Making  Number and Complexity of problems: 4 high complexity    Conditions and Status:        Condition is improving.     MDM Data  External documents reviewed: Previous records    Tests considered but not ordered: None     Decision rules/scores evaluated (example [id number], Wells, etc): None     Discussed with: Patient     Treatment Plan  As above    Care Planning  Shared decision making: Patient  Code status and discussions: Full    Disposition  Social Determinants of Health that impact treatment or disposition: None  I expect the patient to be discharged to ARU versus rehab versus home with home health in 1-2 days.       I have utilized all available immediate resources to obtain, update, or review the patient's current medications (including all prescriptions, over-the-counter products, herbals, cannabis/cannabidiol products, and vitamin/mineral/dietary (nutritional) supplements).   I confirmed that the patient's Advance Care Plan is present, code status is documented, or surrogate decision maker is listed in the patient's medical record.      Marty Gloria MD

## 2023-03-29 NOTE — PLAN OF CARE
Goal Outcome Evaluation:  Plan of Care Reviewed With: patient        Progress: improving  Outcome Evaluation: Pt tolerated BID tx this date but c/o increase pain in pm. Pt able to t/f sup to sit to sup with min of 1. Pt stood with min of 1. Pt amb 14ft and up/down 5 steps with min of 1 with B hand rails. Pt requires lots of time to complete gait activity due to pt moves slowly but is steady. Pt able to perform B LE ther ex in sup and in sitting and requires lots of VC for proper tech. Pt would cont to benefit from therapy upon DC.

## 2023-03-29 NOTE — THERAPY TREATMENT NOTE
Acute Care - Physical Therapy Treatment Note  [facility]     Patient Name: Rosaura Salgado  : 1940  MRN: [id number]  Today's Date: 3/29/2023      Visit Dx:     ICD-10-CM ICD-9-CM   1. Closed fracture of neck of right femur, initial encounter ([facility])  S72.001A 820.8   2. Leukopenia, unspecified type  D72.819 288.50   3. Thrombocytopenia ([facility])  D69.6 287.5   4. Hypokalemia  E87.6 276.8   5. Bradycardia  R00.1 427.89   6. Traumatic closed displaced fracture of neck of right femur, initial encounter ([facility])  S72.001A 820.8   7. Carotid stenosis, asymptomatic, bilateral  I65.23 433.10     433.30   8. Primary biliary cirrhosis  K74.3 571.6   9. Essential hypertension  I10 401.9   10. Atherosclerosis of native coronary artery of native heart without angina pectoris  I25.10 414.01   11. Primary biliary cholangitis ([facility])  K74.3 571.6   12. Impaired mobility and ADLs  Z74.09 V49.89    Z78.9    13. Impaired functional mobility, balance, gait, and endurance  Z74.09 V49.89     Patient Active Problem List   Diagnosis   • Coronary atherosclerosis of native coronary artery   • Pulmonary embolus ([facility])   • Pneumonia   • Fibromyalgia   • Mixed hyperlipidemia   • Essential hypertension   • Primary biliary cirrhosis   • Carotid stenosis, asymptomatic, bilateral   • Atherosclerosis of artery of extremity with intermittent claudication ([facility])   • Traumatic closed displaced fracture of neck of right femur, initial encounter ([facility])   • Thrombocytopenia ([facility])   • Closed fracture of neck of right femur, initial encounter ([facility])   • Moderate malnutrition ([facility])     Past Medical History:   Diagnosis Date   • Allergic rhinitis    • Coronary atherosclerosis of native coronary artery    • Depression    • Fibromyalgia    • GERD (gastroesophageal reflux disease)    • Hyperlipidemia    • Hypertension    • On anticoagulant therapy    • Peptic ulcer    • Peripheral vascular disease, unspecified ([facility])    • Pneumonia    • Pulmonary embolus  (HCC)      Past Surgical History:   Procedure Laterality Date   • CAROTID ENDARTERECTOMY Left    • COLONOSCOPY N/A 2/1/2019    Procedure: COLONOSCOPY;  Surgeon: Maurice Carmona DO;  Location: [facility] ENDOSCOPY;  Service: Gastroenterology   • ENDOSCOPY N/A 2/1/2019    Procedure: ESOPHAGOGASTRODUODENOSCOPY;  Surgeon: Maurice Carmona DO;  Location: [facility] ENDOSCOPY;  Service: Gastroenterology   • KNEE SURGERY       PT Assessment (last 12 hours)     PT Evaluation and Treatment     Row Name 03/29/23 1356 03/29/23 0946       Physical Therapy Time and Intention    Subjective Information complains of;fatigue;pain  -TW complains of;pain;weakness  -TW    Document Type therapy note (daily note)  -TW therapy note (daily note)  -TW    Mode of Treatment physical therapy;individual therapy  -TW physical therapy;individual therapy  -TW    Patient Effort good  -TW good  -TW    Row Name 03/29/23 1356 03/29/23 0946       General Information    Patient Profile Reviewed yes  -TW yes  -TW    Patient Observations alert;cooperative;agree to therapy  -TW alert;cooperative;agree to therapy  -TW    Patient/Family/Caregiver Comments/Observations none  -TW none  -TW    General Observations of Patient Pt sitting EOB with nsg present.  -TW Pt sitting up in recliner.  -TW    Existing Precautions/Restrictions fall  -TW fall  -TW    Row Name 03/29/23 1356 03/29/23 0946       Pain    Pretreatment Pain Rating 4/10  -TW --    Posttreatment Pain Rating 4/10  -TW --    Pain Location - Side/Orientation Right  -TW --    Pain Location - hip  -TW --    Pre/Posttreatment Pain Comment -- Pt states she has no pain but displays otherwise with movement of R LE.  -TW    Row Name 03/29/23 1356 03/29/23 0946       Cognition    Affect/Mental Status (Cognition) WFL;anxious  -TW WFL;anxious  -TW    Orientation Status (Cognition) oriented x 4  -TW oriented x 4  -TW    Follows Commands (Cognition) WFL  -TW WFL  -TW    Personal Safety Interventions fall prevention  program maintained;gait belt;nonskid shoes/slippers when out of bed;muscle strengthening facilitated  -TW fall prevention program maintained;gait belt;muscle strengthening facilitated;nonskid shoes/slippers when out of bed  -TW    Row Name 03/29/23 1356 03/29/23 0946       Mobility    Right Lower Extremity (Weight-bearing Status) weight-bearing as tolerated (WBAT)  -TW weight-bearing as tolerated (WBAT)  -TW    Row Name 03/29/23 1356 03/29/23 0946       Bed Mobility    Supine-Sit [place] (Bed Mobility) not tested  -TW --    Sit-Supine [place] (Bed Mobility) minimum assist (75% patient effort)  -TW --    Bed Mobility, Safety Issues decreased use of legs for bridging/pushing  -TW --    Assistive Device (Bed Mobility) bed rails;head of bed elevated  -TW --    Comment, (Bed Mobility) -- NT  -TW    Row Name 03/29/23 1356 03/29/23 0946       Transfers    Transfers stand-sit transfer  -TW stand-sit transfer  -TW    Row Name 03/29/23 1356 03/29/23 0946       Sit-Stand Transfer    Sit-Stand [place] (Transfers) minimum assist (75% patient effort);verbal cues  -TW minimum assist (75% patient effort);verbal cues  -TW    Assistive Device (Sit-Stand Transfers) walker, front-wheeled  -TW walker, front-wheeled  -TW    Row Name 03/29/23 1356 03/29/23 0946       Stand-Sit Transfer    Stand-Sit [place] (Transfers) minimum assist (75% patient effort);verbal cues  -TW minimum assist (75% patient effort);verbal cues  -TW    Assistive Device (Stand-Sit Transfers) walker, front-wheeled  -TW walker, front-wheeled  -TW    Row Name 03/29/23 1356 03/29/23 0946       Gait/Stairs (Locomotion)    [place] Level (Gait) minimum assist (75% patient effort)  -TW minimum assist (75% patient effort)  -TW    Assistive Device (Gait) walker, front-wheeled  -TW walker, front-wheeled  -TW    Distance in Feet (Gait) Pt took 5 steps to FOB then back to HOB.  -TW 14ft x4  -TW    Pattern (Gait) step-to  -TW step-to  -TW     Deviations/Abnormal Patterns (Gait) antalgic;base of support, narrow;gait speed decreased;stride length decreased  -TW antalgic;base of support, narrow;gait speed decreased;stride length decreased  -TW    Bilateral Gait Deviations forward flexed posture  -TW forward flexed posture  -TW    [place] Level (Stairs) -- minimum assist (75% patient effort)  -TW    Handrail Location (Stairs) -- both sides  -TW    Number of Steps (Stairs) -- 5 steps x2  -TW    Ascending Technique (Stairs) -- step-to-step  -TW    Descending Technique (Stairs) -- step-to-step  -TW    Row Name 03/29/23 0946          Motor Skills    Therapeutic Exercise hip;knee;ankle  -TW     Row Name 03/29/23 1356 03/29/23 0946       Hip (Therapeutic Exercise)    Hip (Therapeutic Exercise) -- isometric exercises;AAROM (active assistive range of motion)  -TW    Hip AAROM (Therapeutic Exercise) right;sitting  -TW right;sitting  with LE's elevated  -TW    Hip Isometrics (Therapeutic Exercise) bilateral;supine  -TW bilateral;sitting  with LE's elevated.  -TW    Row Name 03/29/23 1356 03/29/23 0946       Knee (Therapeutic Exercise)    Knee (Therapeutic Exercise) -- AAROM (active assistive range of motion);isometric exercises  -TW    Knee AAROM (Therapeutic Exercise) right;sitting  -TW right;sitting  with LE elevated  -TW    Knee Isometrics (Therapeutic Exercise) bilateral;supine  -TW bilateral;sitting  with LE's elevated  -TW    Row Name 03/29/23 1356 03/29/23 0946       Ankle (Therapeutic Exercise)    Ankle (Therapeutic Exercise) -- AROM (active range of motion)  -TW    Ankle AROM (Therapeutic Exercise) bilateral;sitting;supine  -TW bilateral;sitting  -TW    Row Name             Wound 03/28/23 1320 Right hip Incision    Wound - Properties Group Placement Date: 03/28/23  -EW Placement Time: 1320 -EW Side: Right  -EW Location: hip  -EW Primary Wound Type: Incision  -EW    Retired Wound - Properties Group Placement Date: 03/28/23  -EW Placement Time: 1320 -EW  Side: Right  -EW Location: hip  -EW Primary Wound Type: Incision  -EW    Retired Wound - Properties Group Date first assessed: 03/28/23 -EW Time first assessed: 1320  -EW Side: Right  -EW Location: hip  -EW Primary Wound Type: Incision  -EW    Row Name             Wound 03/28/23 1350 sacral spine Pressure Injury    Wound - Properties Group Placement Date: 03/28/23 -EW Placement Time: 1350  -EW Location: sacral spine  -EW Primary Wound Type: Pressure inj  -EW    Retired Wound - Properties Group Placement Date: 03/28/23 -EW Placement Time: 1350  -EW Location: sacral spine  -EW Primary Wound Type: Pressure inj  -EW    Retired Wound - Properties Group Date first assessed: 03/28/23 -EW Time first assessed: 1350 -EW Location: sacral spine  -EW Primary Wound Type: Pressure inj  -EW    Row Name             Wound 03/28/23 1351 thoracic spine Pressure Injury    Wound - Properties Group Placement Date: 03/28/23 -EW Placement Time: 1351  -EW Present on Hospital Admission: N  -EW Location: thoracic spine  -EW Primary Wound Type: Pressure inj  -EW    Retired Wound - Properties Group Placement Date: 03/28/23 -EW Placement Time: 1351  -EW Present on Hospital Admission: N  -EW Location: thoracic spine  -EW Primary Wound Type: Pressure inj  -EW    Retired Wound - Properties Group Date first assessed: 03/28/23 -EW Time first assessed: 1351  -EW Present on Hospital Admission: N  -EW Location: thoracic spine  -EW Primary Wound Type: Pressure inj  -EW    Row Name 03/29/23 1356 03/29/23 0946       Plan of Care Review    Plan of Care Reviewed With patient  -TW patient  -TW    Progress improving  -TW improving  -TW    Outcome Evaluation Pt tolerated BID tx this date but c/o increase pain in pm. Pt able to t/f sup to sit to sup with min of 1. Pt stood with min of 1. Pt amb 14ft and up/down 5 steps with min of 1 with B hand rails. Pt requires lots of time to complete gait activity due to pt moves slowly but is steady. Pt able to  perform B LE ther ex in sup and in sitting and requires lots of VC for proper tech. Pt would cont to benefit from therapy upon DC.  -TW --    Row Name 03/29/23 1356 03/29/23 0946       Vital Signs    Pre Systolic BP Rehab 127  -  -TW    Pre Treatment Diastolic BP 64  -TW 74  -TW    Pretreatment Heart Rate (beats/min) 82  -TW 72  -TW    Pre SpO2 (%) 97  -TW 95  -TW    O2 Delivery Pre Treatment room air  -TW room air  -TW    Pre Patient Position Sitting  -TW Sitting  -TW    Intra Patient Position Standing  -TW Standing  -TW    Post Patient Position Supine  -TW Sitting  -TW    Row Name 03/29/23 1356 03/29/23 0946       Positioning and Restraints    Pre-Treatment Position in bed  -TW sitting in chair/recliner  -TW    Post Treatment Position bed  -TW chair  -TW    In Bed supine;call light within reach;encouraged to call for assist;exit alarm on  -TW --    In Chair -- reclined;call light within reach;encouraged to call for assist;exit alarm on  -TW    Row Name 03/29/23 1356 03/29/23 0946       Therapy Assessment/Plan (PT)    Rehab Potential (PT) good, to achieve stated therapy goals  -TW good, to achieve stated therapy goals  -TW    Row Name 03/29/23 1356 03/29/23 0946       Bed Mobility Goal 1 (PT)    Activity/Assistive Device (Bed Mobility Goal 1, PT) bed mobility activities, all  -TW bed mobility activities, all  -TW    [place] Level/Cues Needed (Bed Mobility Goal 1, PT) independent  -TW independent  -TW    Time Frame (Bed Mobility Goal 1, PT) by discharge  -TW by discharge  -TW    Progress/Outcomes (Bed Mobility Goal 1, PT) goal not met  -TW goal not met  -TW    Row Name 03/29/23 1356 03/29/23 0946       Transfer Goal 1 (PT)    Activity/Assistive Device (Transfer Goal 1, PT) sit-to-stand/stand-to-sit;bed-to-chair/chair-to-bed  -TW sit-to-stand/stand-to-sit;bed-to-chair/chair-to-bed  -TW    [place] Level/Cues Needed (Transfer Goal 1, PT) modified independence  -TW modified independence  -TW    Time  Frame (Transfer Goal 1, PT) by discharge  -TW by discharge  -TW    Progress/Outcome (Transfer Goal 1, PT) goal not met  -TW goal not met  -TW    Row Name 03/29/23 1356 03/29/23 0946       Gait Training Goal 1 (PT)    Activity/Assistive Device (Gait Training Goal 1, PT) gait (walking locomotion);decrease fall risk;increase endurance/gait distance  -TW gait (walking locomotion);decrease fall risk;increase endurance/gait distance  -TW    [place] Level (Gait Training Goal 1, PT) modified independence;standby assist  -TW modified independence;standby assist  -TW    Distance (Gait Training Goal 1, PT) 150ft or more each trip  -TW 150ft or more each trip  -TW    Time Frame (Gait Training Goal 1, PT) by discharge  -TW by discharge  -TW    Progress/Outcome (Gait Training Goal 1, PT) goal not met  -TW goal not met  -TW    Row Name 03/29/23 1356 03/29/23 0946       ROM Goal 1 (PT)    ROM Goal 1 (PT) Pt will improve LE range so she is able to raise/lower BLE on/off bed for supine to sit to supine without assistance  -TW Pt will improve LE range so she is able to raise/lower BLE on/off bed for supine to sit to supine without assistance  -TW    Time Frame (ROM Goal 1, PT) by discharge  -TW by discharge  -TW    Progress/Outcome (ROM Goal 1, PT) goal not met  -TW goal not met  -TW    Row Name 03/29/23 1356 03/29/23 0946       Stairs Goal 1 (PT)    Activity/Assistive Device (Stairs Goal 1, PT) ascending stairs;descending stairs;using handrail, left;using handrail, right  -TW ascending stairs;descending stairs;using handrail, left;using handrail, right  -TW    [place] Level/Cues Needed (Stairs Goal 1, PT) modified independence  -TW modified independence  -TW    Number of Stairs (Stairs Goal 1, PT) 5  -TW 5  -TW    Time Frame (Stairs Goal 1, PT) 1 week  -TW 1 week  -TW    Progress/Outcome (Stairs Goal 1, PT) goal not met  -TW goal not met  -TW          User Key  (r) = Recorded By, (t) = Taken By, (c) = Cosigned By     Initials Name Provider Type    TW Dk Sanchez PTA Physical Therapist Assistant    Margie Kirby RN Registered Nurse                Physical Therapy Education     Title: PT OT SLP Therapies (In Progress)     Topic: Physical Therapy (In Progress)     Point: Mobility training (In Progress)     Learning Progress Summary           Patient Acceptance, E, NR by  at 3/28/2023 1829                   Point: Home exercise program (Not Started)     Learner Progress:  Not documented in this visit.          Point: Body mechanics (In Progress)     Learning Progress Summary           Patient Acceptance, E, NR by  at 3/28/2023 1829                   Point: Precautions (In Progress)     Learning Progress Summary           Patient Acceptance, E, NR by  at 3/28/2023 1829                               User Key     Initials Effective Dates Name Provider Type Discipline     06/16/21 -  Lisseth Browning PT Physical Therapist PT              PT Recommendation and Plan  Anticipated Discharge Disposition (PT): inpatient rehabilitation facility, skilled nursing facility  Plan of Care Reviewed With: patient  Progress: improving  Outcome Evaluation: Pt tolerated BID tx this date but c/o increase pain in pm. Pt able to t/f sup to sit to sup with min of 1. Pt stood with min of 1. Pt amb 14ft and up/down 5 steps with min of 1 with B hand rails. Pt requires lots of time to complete gait activity due to pt moves slowly but is steady. Pt able to perform B LE ther ex in sup and in sitting and requires lots of VC for proper tech. Pt would cont to benefit from therapy upon DC.       Time Calculation:    PT Charges     Row Name 03/29/23 1602 03/29/23 1234          Time Calculation    Start Time 1356  -TW 0946  -TW     Stop Time 1420  -TW 1026  -TW     Time Calculation (min) 24 min  -TW 40 min  -TW        Time Calculation- PT    Total Timed Code Minutes- PT 24 minute(s)  -TW 40 minute(s)  -TW           User Key  (r) = Recorded By, (t) =  Taken By, (c) = Cosigned By    Initials Name Provider Type    TW Dk Sanchez, TIM Physical Therapist Assistant              Therapy Charges for Today     Code Description Service Date Service Provider Modifiers Qty    88392377852 HC GAIT TRAINING EA 15 MIN 3/29/2023 Dk Sanchez, TIM GP 1    97872850627 HC PT THER PROC EA 15 MIN 3/29/2023 Dk Sanchez, PTA GP 1    79230379676 HC PT THERAPEUTIC ACT EA 15 MIN 3/29/2023 Dk Sanchez, PTA GP 1    26852814147 HC GAIT TRAINING EA 15 MIN 3/29/2023 Dk Sanchez, TIM GP 1    50820265203 HC PT THER PROC EA 15 MIN 3/29/2023 Dk Sanchez, PTA GP 1          PT G-Codes  Outcome Measure Options: AM-PAC 6 Clicks Basic Mobility (PT)  AM-PAC 6 Clicks Score (PT): 17  AM-PAC 6 Clicks Score (OT): 18    Dk Sanchez PTA  3/29/2023

## 2023-03-29 NOTE — PROGRESS NOTES
[facility]     Progress Note    Patient Name: Rosaura Salgado  : 1940  MRN: [id number]  Primary Care Physician:  Vitaliy Kerr MD  Date of admission: 3/27/2023    Subjective   Subjective     Chief Complaint: POD1 status post Right hip     History of Present Illness  Patient Reports ***    Review of Systems    Objective   Objective     Vitals:   Temp:  [97 °F (36.1 °C)-99.3 °F (37.4 °C)] 98.6 °F (37 °C)  Heart Rate:  [64-80] 73  Resp:  [16-20] 16  BP: (144-210)/(58-84) 182/58  Flow (L/min):  [1-6] 1    Physical Exam     Result Review    Result Review:  I have personally reviewed the results from the time of this admission to 3/29/2023 06:05 CDT and agree with these findings:  []  Laboratory list / accordion  []  Microbiology  []  Radiology  []  EKG/Telemetry   []  Cardiology/Vascular   []  Pathology  []  Old records  []  Other:  Most notable findings include: ***      Assessment & Plan   Assessment / Plan     Brief Patient Summary:  Rosaura Salgado is a 82 y.o. female who ***    Active Hospital Problems:  Active Hospital Problems    Diagnosis    • **Traumatic closed displaced fracture of neck of right femur, initial encounter ([facility])    • Closed fracture of neck of right femur, initial encounter ([facility])    • Thrombocytopenia (HCC)    • Carotid stenosis, asymptomatic, bilateral    • Essential hypertension    • Primary biliary cirrhosis    • Coronary atherosclerosis of native coronary artery      Plan:       DVT prophylaxis:  Mechanical DVT prophylaxis orders are present.    CODE STATUS:    Code Status (Patient has no pulse and is not breathing): CPR (Attempt to Resuscitate)  Medical Interventions (Patient has pulse or is breathing): Full Support    Disposition:  I expect patient to be discharged ***.    Deandre Long, Medical Student

## 2023-03-29 NOTE — PROGRESS NOTES
Patient seen and examined in conjunction with the medical student, please refer to my note for any pertinent details.    Marty Gloria MD             [facility] Medicine Services  INPATIENT PROGRESS NOTE      Length of Stay: 1  Date of Admission: 3/27/2023  Primary Care Physician: Vitaliy Kerr MD    Subjective   Chief Complaint: Right Hip Pain  HPI:  Patient is an 82-year-old female with past medical history notable for CAD, primary biliary cirrhosis, thrombocytopenia, hypertension, and PVD.  Pt arrived at ED on 3/27 after a fall that morning. On questioning Pt  states that she was bent over and bumped her head. This resulted in a forward fall and the pt landed on her left shoulder and right hip. Pt also presented with a wound on the back of her head and skin tear on right elbow. Pt was unable to get off of the ground and EMS was called. Heart rate was in the 40s on arrival.     Right femur neck fracture was found on ED evaluation.    Pt underwent right hip bipolar anterior approach operation on 2/28.    Today pt reports minimal pain. She reports passing flatus but has had no bowel movements. She is able to ambulate with assistance from PT/OT.     Review of Systems   Constitutional: Negative.  Negative for appetite change and fever.   HENT: Negative.  Negative for ear pain and hearing loss.    Eyes: Negative.  Negative for pain.   Respiratory: Negative.  Negative for chest tightness.    Cardiovascular: Negative.  Negative for chest pain and palpitations.   Gastrointestinal: Negative.    Endocrine: Negative.    Genitourinary: Negative.    Musculoskeletal: Negative.    Skin: Negative.    Neurological: Negative.  Negative for seizures and headaches.   Hematological: Negative.    Psychiatric/Behavioral: Negative.       All pertinent negatives and positives are as above. All other systems have been reviewed and are negative unless otherwise stated.     Objective    As  of today 03/29/23  Temp:  [97 °F (36.1 °C)-99.3 °F (37.4 °C)] 98.5 °F (36.9 °C)  Heart Rate:  [64-87] 73  Resp:  [16-20] 18  BP: (144-200)/(58-84) 164/74    Physical Exam  Constitutional:       General: She is not in acute distress.     Appearance: Normal appearance.   HENT:      Head: Normocephalic and atraumatic.      Right Ear: Tympanic membrane normal.      Left Ear: Tympanic membrane normal.      Nose: Nose normal.      Mouth/Throat:      Mouth: Mucous membranes are moist.      Pharynx: Oropharynx is clear.   Eyes:      Pupils: Pupils are equal, round, and reactive to light.   Cardiovascular:      Rate and Rhythm: Normal rate and regular rhythm.      Pulses: Normal pulses.      Heart sounds: Normal heart sounds.   Pulmonary:      Effort: Pulmonary effort is normal.      Breath sounds: Normal breath sounds.   Abdominal:      General: Abdomen is flat.      Palpations: Abdomen is soft.   Musculoskeletal:         General: No swelling.      Cervical back: Normal range of motion and neck supple.   Skin:     General: Skin is warm and dry.   Neurological:      Mental Status: She is alert and oriented to person, place, and time.   Psychiatric:         Mood and Affect: Mood normal.         Behavior: Behavior normal.       Results Review:  I have reviewed the labs, radiology results, and diagnostic studies.    Laboratory Data:   Results from last 7 days   Lab Units 03/29/23  0747 03/28/23  0548 03/27/23  2113 03/27/23  0919   SODIUM mmol/L 136 135*  --  139   POTASSIUM mmol/L 3.7 4.1 3.4* 2.8*   CHLORIDE mmol/L 108* 107  --  107   CO2 mmol/L 18.0* 15.0*  --  23.0   BUN mg/dL 23 12  --  11   CREATININE mg/dL 1.28* 0.98  --  1.10*   GLUCOSE mg/dL 184* 126*  --  126*   CALCIUM mg/dL 8.1* 8.4*  --  8.0*   BILIRUBIN mg/dL 1.4* 2.3*  --  1.0   ALK PHOS U/L 311* 395*  --  385*   ALT (SGPT) U/L 18 30  --  30   AST (SGOT) U/L 36* 47*  --  46*   ANION GAP mmol/L 10.0 13.0  --  9.0     Estimated Creatinine Clearance: 28.4 mL/min (A)  (by C-G formula based on SCr of 1.28 mg/dL (H)).  Results from last 7 days   Lab Units 03/28/23  0110 03/27/23  0919   MAGNESIUM mg/dL 1.4* 1.7         Results from last 7 days   Lab Units 03/29/23  0747 03/28/23  0548 03/27/23  0919   WBC 10*3/mm3 8.25 10.54 2.40*   HEMOGLOBIN g/dL 10.7* 13.0 10.0*   HEMATOCRIT % 32.0* 39.4 30.3*   PLATELETS 10*3/mm3 93* 85* 40*     Results from last 7 days   Lab Units 03/27/23  0919   INR  1.23*       Culture Data:   No results found for: BLOODCX  No results found for: URINECX  No results found for: RESPCX  No results found for: WOUNDCX  No results found for: STOOLCX  No components found for: BODYFLD    Radiology Data:   Imaging Results (Last 24 Hours)     Procedure Component Value Units Date/Time    FL C Arm During Surgery [[id number]] Resulted: 03/28/23 1337     Updated: 03/28/23 1337          I have reviewed the patient's current medications.     Assessment/Plan     Principal Problem:    Traumatic closed displaced fracture of neck of right femur, initial encounter ([facility])  Active Problems:    Coronary atherosclerosis of native coronary artery    Essential hypertension    Primary biliary cirrhosis    Carotid stenosis, asymptomatic, bilateral    Thrombocytopenia ([facility])    Closed fracture of neck of right femur, initial encounter ([facility])    Moderate malnutrition ([facility])      Treatment Plan  Fracture of the right femur  -Pt is post op day 1. With no complication. Continue to monitor and encourage ambulating with assistance.  -Pt's insurance will not cover ARU but will be unable to go to outpatient therapy. Pt could benefit from Skilled Nursing Facility stay with continued rehab before going home.     DVT prophylaxis  -SCDs is indicated.  Pt is at increased bleeding risk due to age thrombocytopenia so anticoagulation is contraindicated. Use of      Thrombocytopenia  -likely due to liver cirrhosis and decreased production of thrombopoietin. Pt given platelets prior to surgery. -Platelets  continuing to improve.Continue to monitor platelets.       Chapo Ibrahim, Medical Student

## 2023-03-29 NOTE — THERAPY TREATMENT NOTE
Patient Name: Rosaura Salgado  : 1940    MRN: [id number]                              Today's Date: 3/29/2023       Admit Date: 3/27/2023    Visit Dx:     ICD-10-CM ICD-9-CM   1. Closed fracture of neck of right femur, initial encounter ([facility])  S72.001A 820.8   2. Leukopenia, unspecified type  D72.819 288.50   3. Thrombocytopenia ([facility])  D69.6 287.5   4. Hypokalemia  E87.6 276.8   5. Bradycardia  R00.1 427.89   6. Traumatic closed displaced fracture of neck of right femur, initial encounter ([facility])  S72.001A 820.8   7. Carotid stenosis, asymptomatic, bilateral  I65.23 433.10     433.30   8. Primary biliary cirrhosis  K74.3 571.6   9. Essential hypertension  I10 401.9   10. Atherosclerosis of native coronary artery of native heart without angina pectoris  I25.10 414.01   11. 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 Impaired mobility and ADLs  Z74.09 V49.89    Z78.9    13. 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 ([facility])   • Traumatic closed displaced fracture of neck of right femur, initial encounter ([facility])   • Thrombocytopenia ([facility])   • Closed fracture of neck of right femur, initial encounter ([facility])   • Moderate malnutrition ([facility])     Past Medical History:   Diagnosis Date   • Allergic rhinitis    • Coronary atherosclerosis of native coronary artery    • Depression    • Fibromyalgia    • GERD (gastroesophageal reflux disease)    • Hyperlipidemia    • Hypertension    • On anticoagulant therapy    • Peptic ulcer    • Peripheral vascular disease, unspecified ([facility])    • Pneumonia    • Pulmonary embolus ([facility])       Past Surgical History:   Procedure Laterality Date   • CAROTID ENDARTERECTOMY Left    • COLONOSCOPY N/A 2/1/2019    Procedure: COLONOSCOPY;  Surgeon: Maurice Carmona DO;  Location: [facility] ENDOSCOPY;  Service: Gastroenterology   • ENDOSCOPY N/A 2/1/2019    Procedure: ESOPHAGOGASTRODUODENOSCOPY;  Surgeon: Maurice Carmona DO;  Location: [facility] ENDOSCOPY;  Service: Gastroenterology   • KNEE SURGERY        General Information     Row Name 03/29/23 0800          OT Time and Intention    Document Type therapy note (daily note)  -CS     Mode of Treatment occupational therapy  -CS     Row Name 03/29/23 0800          General Information    Patient Profile Reviewed yes  -CS     Existing Precautions/Restrictions fall  -CS     Row Name 03/29/23 0800          Cognition    Orientation Status (Cognition) oriented x 4  -CS     Row Name 03/29/23 0800          Safety Issues, Functional Mobility    Impairments Affecting Function (Mobility) balance;endurance/activity tolerance;range of motion (ROM);strength  -CS           User Key  (r) = Recorded By, (t) = Taken By, (c) = Cosigned By    Initials Name Provider Type    CS Blanquita Meier COTA Occupational Therapist Assistant                 Mobility/ADL's     Row Name 03/29/23 0800          Transfers    Transfers sit-stand transfer;toilet transfer  -     Row Name 03/29/23 0800          Sit-Stand Transfer    Sit-Stand [place] (Transfers) minimum assist (75% patient effort)  -CS     Assistive Device (Sit-Stand Transfers) walker, front-wheeled  -     Row Name 03/29/23 0800          Toilet Transfer    Type (Toilet Transfer) sit-stand;stand-sit  -CS     [place] Level (Toilet Transfer) minimum assist (75% patient effort)  -CS     Assistive Device (Toilet Transfer) walker, front-wheeled;commode chair  -     Row Name 03/29/23 0800          Functional Mobility    Functional Mobility- Ind. Level minimum assist (75% patient effort)  -CS     Functional Mobility- Device  walker, front-wheeled  -CS     Functional Mobility-Distance (Feet) 5  -CS     Row Name 03/29/23 0800          Activities of Daily Living    BADL Assessment/Intervention toileting  -CS     Row Name 03/29/23 0800          Mobility    Extremity Weight-bearing Status right lower extremity  -CS     Right Lower Extremity (Weight-bearing Status) weight-bearing as tolerated (WBAT)  -CS     Row Name 03/29/23 0800          Toileting Assessment/Training    [place] Level (Toileting) toileting skills;perform perineal hygiene;minimum assist (75% patient effort)  -CS     Position (Toileting) supported standing  -CS           User Key  (r) = Recorded By, (t) = Taken By, (c) = Cosigned By    Initials Name Provider Type    CS Blanquita Meier COTA Occupational Therapist Assistant               Obj/Interventions    No documentation.                Goals/Plan     Row Name 03/29/23 0800          Transfer Goal 1 (OT)    Activity/Assistive Device (Transfer Goal 1, OT) toilet  -CS     [place] Level/Cues Needed (Transfer Goal 1, OT) modified independence  -CS     Time Frame (Transfer Goal 1, OT) long term goal (LTG);by discharge  -CS     Progress/Outcome (Transfer Goal 1, OT) goal not met  -     Row Name 03/29/23 0800          Bathing Goal 1 (OT)    Activity/Device (Bathing Goal 1, OT) lower body bathing  -CS     [place] Level/Cues Needed (Bathing Goal 1, OT) supervision required  -CS     Time Frame (Bathing Goal 1, OT) long term goal (LTG);by discharge  -CS     Progress/Outcomes (Bathing Goal 1, OT) goal not met  -     Row Name 03/29/23 0800          Dressing Goal 1 (OT)    Activity/Device (Dressing Goal 1, OT) lower body dressing  -CS     [place]/Cues Needed (Dressing Goal 1, OT) supervision required  -CS     Time Frame (Dressing Goal 1, OT) long term goal (LTG);by discharge  -CS     Progress/Outcome (Dressing Goal 1, OT) goal not met  -     Row Name 03/29/23 0800          Toileting Goal 1 (OT)     Activity/Device (Toileting Goal 1, OT) toileting skills, all  -CS     [place] Level/Cues Needed (Toileting Goal 1, OT) supervision required  -CS     Time Frame (Toileting Goal 1, OT) long term goal (LTG);by discharge  -CS     Progress/Outcome (Toileting Goal 1, OT) goal not met  -CS           User Key  (r) = Recorded By, (t) = Taken By, (c) = Cosigned By    Initials Name Provider Type    CS Blanquita Meier COTA Occupational Therapist Assistant               Clinical Impression     Row Name 03/29/23 0800          Pain Assessment    Pretreatment Pain Rating 0/10 - no pain  -CS     Posttreatment Pain Rating 0/10 - no pain  -CS     Row Name 03/29/23 0800          Plan of Care Review    Plan of Care Reviewed With patient  -CS     Progress improving  -CS     Outcome Evaluation Pt seated in recliner when LEE arrived. Nsg reports be is wanting to D/C home with daughter. LEE consulted w/ pt and case management regarding D/C plans. Pt performed sit > stand t/f w/ Min A using RW, fxnl mob ~ 5-6 steps to BSC using RW w/ Min A, toilet t/f w/ Min A and toileting w/ Min A for bowel hygiene while standing supported. Pt progressing well with limited pain. Case management reports pts insurance will not cover ARU. Pt not able to go to outpatient therapy secondary to pts daughter not able to drive due to her dx. Pt would most benefit from SNF for continued rehab prior to returning home. Pt very pleasant. Pt seated in recliner w/ all needs met and no s/s of distress. BP elevated pre/post OT tx session. Nsg is aware.  -CS     Row Name 03/29/23 0800          Therapy Assessment/Plan (OT)    Rehab Potential (OT) good, to achieve stated therapy goals  -CS     Criteria for Skilled Therapeutic Interventions Met (OT) yes;skilled treatment is necessary  -CS     Therapy Frequency (OT) daily  -CS     Row Name 03/29/23 0800          Therapy Plan Review/Discharge Plan (OT)    Anticipated Discharge Disposition (OT) inpatient rehabilitation  facility;skilled nursing facility  Continued rehab  -CS     Row Name 03/29/23 0800          Vital Signs    Pre Systolic BP Rehab 186  -CS     Pre Treatment Diastolic BP 78  -CS     Post Systolic BP Rehab 183  -CS     Post Treatment Diastolic BP 77  -CS     Posttreatment Heart Rate (beats/min) 84  -CS     Pre Patient Position Sitting  -CS     Intra Patient Position Standing  -CS     Post Patient Position Sitting  -CS     Row Name 03/29/23 0800          Positioning and Restraints    Pre-Treatment Position sitting in chair/recliner  -CS     Post Treatment Position chair  -CS     In Chair notified nsg;reclined;sitting;call light within reach;encouraged to call for assist;with family/caregiver;exit alarm on;with nsg  -CS           User Key  (r) = Recorded By, (t) = Taken By, (c) = Cosigned By    Initials Name Provider Type    Blanquita Pendleton COTA Occupational Therapist Assistant               Outcome Measures     Row Name 03/29/23 0800          How much help from another is currently needed...    Putting on and taking off regular lower body clothing? 1  -CS     Bathing (including washing, rinsing, and drying) 2  -CS     Toileting (which includes using toilet bed pan or urinal) 3  -CS     Putting on and taking off regular upper body clothing 4  -CS     Taking care of personal grooming (such as brushing teeth) 4  -CS     Eating meals 4  -CS     AM-PAC 6 Clicks Score (OT) 18  -CS           User Key  (r) = Recorded By, (t) = Taken By, (c) = Cosigned By    Initials Name Provider Type    Blanquita Pendleton COTA Occupational Therapist Assistant                Occupational Therapy Education     Title: PT OT SLP Therapies (In Progress)     Topic: Occupational Therapy (In Progress)     Point: ADL training (Done)     Description:   Instruct learner(s) on proper safety adaptation and remediation techniques during self care or transfers.   Instruct in proper use of assistive devices.              Learning Progress Summary            Patient Acceptance, E,TB, VU,NR by  at 3/28/2023 1703    Comment: POC, role of OT, transfer training                   Point: Home exercise program (Not Started)     Description:   Instruct learner(s) on appropriate technique for monitoring, assisting and/or progressing therapeutic exercises/activities.              Learner Progress:  Not documented in this visit.          Point: Precautions (Not Started)     Description:   Instruct learner(s) on prescribed precautions during self-care and functional transfers.              Learner Progress:  Not documented in this visit.          Point: Body mechanics (Not Started)     Description:   Instruct learner(s) on proper positioning and spine alignment during self-care, functional mobility activities and/or exercises.              Learner Progress:  Not documented in this visit.                      User Key     Initials Effective Dates Name Provider Type Discipline     06/14/21 -  Triny Mcdonald, OT Occupational Therapist OT              OT Recommendation and Plan  Therapy Frequency (OT): daily  Plan of Care Review  Plan of Care Reviewed With: patient  Progress: improving  Outcome Evaluation: Pt seated in recliner when LEE arrived. Nsg reports be is wanting to D/C home with daughter. LEE consulted w/ pt and case management regarding D/C plans. Pt performed sit > stand t/f w/ Min A using RW, fxnl mob ~ 5-6 steps to BSC using RW w/ Min A, toilet t/f w/ Min A and toileting w/ Min A for bowel hygiene while standing supported. Pt progressing well with limited pain. Case management reports pts insurance will not cover ARU. Pt not able to go to outpatient therapy secondary to pts daughter not able to drive due to her dx. Pt would most benefit from SNF for continued rehab prior to returning home. Pt very pleasant. Pt seated in recliner w/ all needs met and no s/s of distress. BP elevated pre/post OT tx session. Nsg is aware.     Time Calculation:    Time Calculation-  OT     Row Name 03/29/23 0800             Time Calculation- OT    OT Start Time 0800  -CS      OT Stop Time 0856  -CS      OT Time Calculation (min) 56 min  -CS      Total Timed Code Minutes- OT 56 minute(s)  -CS      OT Received On 03/29/23  -CS         Timed Charges    47137 - OT Self Care/Mgmt Minutes 56  -CS         Total Minutes    Timed Charges Total Minutes 56  -CS       Total Minutes 56  -CS            User Key  (r) = Recorded By, (t) = Taken By, (c) = Cosigned By    Initials Name Provider Type    CS Blanquita Meier COTA Occupational Therapist Assistant              Therapy Charges for Today     Code Description Service Date Service Provider Modifiers Qty    89326942157 HC OT SELF CARE/MGMT/TRAIN EA 15 MIN 3/29/2023 Blanquita Meier COTA GO 4               ROSA Deleon  3/29/2023

## 2023-03-29 NOTE — PROGRESS NOTES
[facility]     Progress Note    Patient Name: Rosaura Salgado  : 1940  MRN: [id number]  Primary Care Physician:  Vitaliy Kerr MD  Date of admission: 3/27/2023    Subjective   Subjective     Chief Complaint: POD1 status post right hip bipolar anterior approach    History of Present Illness  Patient Reports no BM but passing flatus, minimal pain, breathing well, reports good sleep, able to ambulate with PT/OT, no other concerns    Review of Systems   Constitutional: Negative for chills and fever.   Respiratory: Negative for cough and shortness of breath.    Cardiovascular: Negative for chest pain, palpitations and leg swelling.   Gastrointestinal: Negative for abdominal distention, abdominal pain, nausea and vomiting.       Objective   Objective     Vitals:   Temp:  [97 °F (36.1 °C)-99.3 °F (37.4 °C)] 98.6 °F (37 °C)  Heart Rate:  [64-80] 73  Resp:  [16-20] 16  BP: (144-210)/(58-84) 182/58  Flow (L/min):  [1-6] 1    Physical Exam  Constitutional:       General: She is not in acute distress.     Appearance: Normal appearance. She is not ill-appearing.   Cardiovascular:      Rate and Rhythm: Normal rate and regular rhythm.      Pulses: Normal pulses.   Pulmonary:      Effort: Pulmonary effort is normal. No respiratory distress.      Breath sounds: Normal breath sounds.   Abdominal:      General: Abdomen is flat. There is no distension.      Palpations: Abdomen is soft. There is no mass.      Tenderness: There is no abdominal tenderness.   Musculoskeletal:      Right lower leg: No swelling or tenderness. No edema.      Left lower leg: No swelling or tenderness. No edema.        Legs:    Neurological:      Mental Status: She is alert.          Result Review    Result Review:  I have personally reviewed the results from the time of this admission to 3/29/2023 06:17 CDT and agree with these findings:  [x]  Laboratory list / accordion  []  Microbiology  []  Radiology  []  EKG/Telemetry   []   Cardiology/Vascular   []  Pathology  []  Old records  []  Other:  Most notable findings include:   - Vitals, I/Os, Labs reviewed  - BP remains high around 182/59 but appears to be baseline.  - Platelets low 85 (Pt has diagnosis of thrombocytopenia)  - hemoglobin 13.0    Results from last 7 days   Lab Units 03/29/23  0747 03/28/23  0548 03/27/23  0919   WBC 10*3/mm3 8.25 10.54 2.40*   HEMOGLOBIN g/dL 10.7* 13.0 10.0*   HEMATOCRIT % 32.0* 39.4 30.3*   PLATELETS 10*3/mm3 93* 85* 40*     Results from last 7 days   Lab Units 03/29/23  0747   SODIUM mmol/L 136   POTASSIUM mmol/L 3.7   CHLORIDE mmol/L 108*   CO2 mmol/L 18.0*   BUN mg/dL 23   CREATININE mg/dL 1.28*   CALCIUM mg/dL 8.1*   BILIRUBIN mg/dL 1.4*   ALK PHOS U/L 311*   ALT (SGPT) U/L 18   AST (SGOT) U/L 36*   GLUCOSE mg/dL 184*         Assessment & Plan   Assessment / Plan     Brief Patient Summary:  Rosaura Salgado is a 82 y.o. female who is status post right hip bipolar hip replacement and doing well.    Active Hospital Problems:  Active Hospital Problems    Diagnosis    • **Traumatic closed displaced fracture of neck of right femur, initial encounter ([facility])    • Closed fracture of neck of right femur, initial encounter ([facility])    • Thrombocytopenia ([facility])    • Carotid stenosis, asymptomatic, bilateral    • Essential hypertension    • Primary biliary cirrhosis    • Coronary atherosclerosis of native coronary artery      Plan:   1. Encourage ambulation and movement as tolerated  2. Continue DVT prophylaxis  3. Continue wound care and pain control measures    DVT prophylaxis:  Mechanical DVT prophylaxis orders are present.      Deandre Long, Medical Student      As above  [place] soft and nontender.  Very pleasant.  Work with PT/OT  Patient desires to go home.  Will work towards discharge home with home health in 1-2 days.  DVT prophylaxis : mechanical per primary team    Electronically signed by Juan Carlos Jesus MD on 03/29/23 at 10:11 CDT

## 2023-03-29 NOTE — PLAN OF CARE
Goal Outcome Evaluation:           Progress: improving  Outcome Evaluation: PRN BP meds given. PRN tylenol given x2 this shift. Dressing CDI. Pedal pulse and cap refill present in the RLE. Resting between care. No acute complaints.

## 2023-03-29 NOTE — DISCHARGE PLACEMENT REQUEST
"Rosaura Salgado (82 y.o. Female)     Date of Birth   1940    Social Security Number       Address   [address] [place] [address]    Home Phone   [phone]    MRN   [id number]       Pentecostal   None    Marital Status   Single                            Admission Date   3/27/23    Admission Type   Emergency    Admitting Provider   Marty Gloria MD    Attending Provider   Marty Gloria MD    Department, Room/Bed   [address], 424/1       Discharge Date       Discharge Disposition       Discharge Destination                               Attending Provider: Marty Gloria MD    Allergies: No Known Allergies    Isolation: None   Infection: None   Code Status: CPR    Ht: 165.1 cm (65\")   Wt: 52.2 kg (115 lb)    Admission Cmt: None   Principal Problem: Traumatic closed displaced fracture of neck of right femur, initial encounter ([facility]) [S72.001A]                 Active Insurance as of 3/27/2023     Primary Coverage     Payor Plan Insurance Group Employer/Plan Group    HUMANA MEDICARE REPLACEMENT HUMANA MEDICARE REPLACEMENT [id number]     Payor Plan Address Payor Plan Phone Number Payor Plan Fax Number Effective Dates    [address] [address] [phone]  6/1/2018 - None Entered    [facility] [id number]       Subscriber Name Subscriber Birth Date Member ID       ROSAURA SALGADO NOMI 1940 [id number]                 Emergency Contacts      (Rel.) Home Phone Work Phone Mobile Phone    Radhika Mace (Daughter) [phone] -- [phone]            Emergency Contact Information     Name Relation Home Work Mobile    Radhika Mace Daughter [phone]          Insurance Information                HUMANA MEDICARE REPLACEMENT/HUMANA MEDICARE REPLACEMENT Phone: [phone]    Subscriber: Rosaura Salgado Subscriber#: [id number]    Group#: [id number] Precert#: --          "

## 2023-03-30 ENCOUNTER — APPOINTMENT (OUTPATIENT)
Dept: GENERAL RADIOLOGY | Facility: HOSPITAL | Age: 83
DRG: 522 | End: 2023-03-30
Payer: MEDICARE

## 2023-03-30 LAB
BASOPHILS # BLD AUTO: 0.03 10*3/MM3 (ref 0–0.2)
BASOPHILS NFR BLD AUTO: 0.3 % (ref 0–1.5)
BH BB BLOOD EXPIRATION DATE: NORMAL
BH BB BLOOD EXPIRATION DATE: NORMAL
BH BB BLOOD TYPE BARCODE: 6200
BH BB BLOOD TYPE BARCODE: 6200
BH BB DISPENSE STATUS: NORMAL
BH BB DISPENSE STATUS: NORMAL
BH BB PRODUCT CODE: NORMAL
BH BB PRODUCT CODE: NORMAL
BH BB UNIT NUMBER: NORMAL
BH BB UNIT NUMBER: NORMAL
DEPRECATED RDW RBC AUTO: 54.9 FL (ref 37–54)
EOSINOPHIL # BLD AUTO: 0.02 10*3/MM3 (ref 0–0.4)
EOSINOPHIL NFR BLD AUTO: 0.2 % (ref 0.3–6.2)
ERYTHROCYTE [DISTWIDTH] IN BLOOD BY AUTOMATED COUNT: 17.2 % (ref 12.3–15.4)
HCT VFR BLD AUTO: 28.1 % (ref 34–46.6)
HGB BLD-MCNC: 9.6 G/DL (ref 12–15.9)
IMM GRANULOCYTES # BLD AUTO: 0.09 10*3/MM3 (ref 0–0.05)
IMM GRANULOCYTES NFR BLD AUTO: 1 % (ref 0–0.5)
LYMPHOCYTES # BLD AUTO: 1.23 10*3/MM3 (ref 0.7–3.1)
LYMPHOCYTES NFR BLD AUTO: 13.3 % (ref 19.6–45.3)
MCH RBC QN AUTO: 30.5 PG (ref 26.6–33)
MCHC RBC AUTO-ENTMCNC: 34.2 G/DL (ref 31.5–35.7)
MCV RBC AUTO: 89.2 FL (ref 79–97)
MONOCYTES # BLD AUTO: 0.66 10*3/MM3 (ref 0.1–0.9)
MONOCYTES NFR BLD AUTO: 7.1 % (ref 5–12)
NEUTROPHILS NFR BLD AUTO: 7.21 10*3/MM3 (ref 1.7–7)
NEUTROPHILS NFR BLD AUTO: 78.1 % (ref 42.7–76)
NRBC BLD AUTO-RTO: 0 /100 WBC (ref 0–0.2)
PLATELET # BLD AUTO: 95 10*3/MM3 (ref 140–450)
PMV BLD AUTO: 10.2 FL (ref 6–12)
RBC # BLD AUTO: 3.15 10*6/MM3 (ref 3.77–5.28)
REF LAB TEST METHOD: NORMAL
UNIT  ABO: NORMAL
UNIT  ABO: NORMAL
UNIT  RH: NORMAL
UNIT  RH: NORMAL
WBC NRBC COR # BLD: 9.24 10*3/MM3 (ref 3.4–10.8)

## 2023-03-30 PROCEDURE — 97535 SELF CARE MNGMENT TRAINING: CPT

## 2023-03-30 PROCEDURE — 85025 COMPLETE CBC W/AUTO DIFF WBC: CPT | Performed by: FAMILY MEDICINE

## 2023-03-30 PROCEDURE — 97116 GAIT TRAINING THERAPY: CPT

## 2023-03-30 PROCEDURE — 25010000002 ENOXAPARIN PER 10 MG: Performed by: FAMILY MEDICINE

## 2023-03-30 PROCEDURE — 99024 POSTOP FOLLOW-UP VISIT: CPT | Performed by: ORTHOPAEDIC SURGERY

## 2023-03-30 PROCEDURE — 97530 THERAPEUTIC ACTIVITIES: CPT

## 2023-03-30 PROCEDURE — 25010000002 HYDRALAZINE PER 20 MG: Performed by: ORTHOPAEDIC SURGERY

## 2023-03-30 PROCEDURE — 25010000002 ONDANSETRON PER 1 MG: Performed by: ORTHOPAEDIC SURGERY

## 2023-03-30 PROCEDURE — 73552 X-RAY EXAM OF FEMUR 2/>: CPT

## 2023-03-30 PROCEDURE — 97110 THERAPEUTIC EXERCISES: CPT

## 2023-03-30 PROCEDURE — 73560 X-RAY EXAM OF KNEE 1 OR 2: CPT

## 2023-03-30 RX ORDER — ENOXAPARIN SODIUM 100 MG/ML
30 INJECTION SUBCUTANEOUS EVERY 24 HOURS
Qty: 8.4 ML | Refills: 0 | Status: SHIPPED | OUTPATIENT
Start: 2023-03-31 | End: 2023-04-28

## 2023-03-30 RX ORDER — CARVEDILOL 12.5 MG/1
12.5 TABLET ORAL 2 TIMES DAILY WITH MEALS
Status: DISCONTINUED | OUTPATIENT
Start: 2023-03-30 | End: 2023-03-31 | Stop reason: HOSPADM

## 2023-03-30 RX ORDER — LOSARTAN POTASSIUM 25 MG/1
25 TABLET ORAL
Status: DISCONTINUED | OUTPATIENT
Start: 2023-03-30 | End: 2023-03-31 | Stop reason: HOSPADM

## 2023-03-30 RX ADMIN — CARVEDILOL 12.5 MG: 12.5 TABLET, FILM COATED ORAL at 18:32

## 2023-03-30 RX ADMIN — AMLODIPINE BESYLATE 5 MG: 5 TABLET ORAL at 08:24

## 2023-03-30 RX ADMIN — HYDRALAZINE HYDROCHLORIDE 10 MG: 20 INJECTION INTRAMUSCULAR; INTRAVENOUS at 09:15

## 2023-03-30 RX ADMIN — DOCUSATE SODIUM 50 MG AND SENNOSIDES 8.6 MG 2 TABLET: 8.6; 5 TABLET, FILM COATED ORAL at 20:49

## 2023-03-30 RX ADMIN — ENOXAPARIN SODIUM 30 MG: 30 INJECTION SUBCUTANEOUS at 20:49

## 2023-03-30 RX ADMIN — ONDANSETRON 4 MG: 2 INJECTION INTRAMUSCULAR; INTRAVENOUS at 21:23

## 2023-03-30 RX ADMIN — HYDRALAZINE HYDROCHLORIDE 10 MG: 20 INJECTION INTRAMUSCULAR; INTRAVENOUS at 03:19

## 2023-03-30 RX ADMIN — LOSARTAN POTASSIUM 25 MG: 25 TABLET, FILM COATED ORAL at 11:16

## 2023-03-30 RX ADMIN — Medication 10 ML: at 08:26

## 2023-03-30 RX ADMIN — Medication 1 TABLET: at 08:24

## 2023-03-30 RX ADMIN — CARVEDILOL 12.5 MG: 12.5 TABLET, FILM COATED ORAL at 08:25

## 2023-03-30 RX ADMIN — HYDROCODONE BITARTRATE AND ACETAMINOPHEN 1 TABLET: 7.5; 325 TABLET ORAL at 05:19

## 2023-03-30 RX ADMIN — Medication 10 ML: at 20:49

## 2023-03-30 RX ADMIN — Medication 1000 UNITS: at 08:24

## 2023-03-30 NOTE — THERAPY TREATMENT NOTE
Patient Name: Rosaura Salgado  : 1940    MRN: [id number]                              Today's Date: 3/30/2023       Admit Date: 3/27/2023    Visit Dx:     ICD-10-CM ICD-9-CM   1. Closed fracture of neck of right femur, initial encounter ([facility])  S72.001A 820.8   2. Leukopenia, unspecified type  D72.819 288.50   3. Thrombocytopenia ([facility])  D69.6 287.5   4. Hypokalemia  E87.6 276.8   5. Bradycardia  R00.1 427.89   6. Traumatic closed displaced fracture of neck of right femur, initial encounter ([facility])  S72.001A 820.8   7. Carotid stenosis, asymptomatic, bilateral  I65.23 433.10     433.30   8. Primary biliary cirrhosis  K74.3 571.6   9. Essential hypertension  I10 401.9   10. Atherosclerosis of native coronary artery of native heart without angina pectoris  I25.10 414.01   11. Primary biliary cholangitis ([facility])  K74.3 571.6   12. Impaired mobility and ADLs  Z74.09 V49.89    Z78.9    13. 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 ([facility])   • Traumatic closed displaced fracture of neck of right femur, initial encounter ([facility])   • Thrombocytopenia ([facility])   • Closed fracture of neck of right femur, initial encounter ([facility])   • Moderate malnutrition ([facility])     Past Medical History:   Diagnosis Date   • Allergic rhinitis    • Coronary atherosclerosis of native coronary artery    • Depression    • Fibromyalgia    • GERD (gastroesophageal reflux disease)    • Hyperlipidemia    • Hypertension    • On anticoagulant therapy    • Peptic ulcer    • Peripheral vascular disease, unspecified ([facility])    • Pneumonia    • Pulmonary embolus ([facility])       Past Surgical History:   Procedure Laterality Date   • CAROTID ENDARTERECTOMY Left    • COLONOSCOPY N/A 2/1/2019    Procedure: COLONOSCOPY;  Surgeon: Maurice Carmona DO;  Location: [facility] ENDOSCOPY;  Service: Gastroenterology   • ENDOSCOPY N/A 2/1/2019    Procedure: ESOPHAGOGASTRODUODENOSCOPY;  Surgeon: Maurice Carmona DO;  Location: [facility] ENDOSCOPY;  Service: Gastroenterology   • KNEE SURGERY        General Information     Row Name 03/30/23 1555          OT Time and Intention    Document Type therapy note (daily note)  -CM     Mode of Treatment individual therapy;occupational therapy  -CM     Row Name 03/30/23 1550          General Information    Patient Profile Reviewed yes  -CM     Existing Precautions/Restrictions fall  -CM     Row Name 03/30/23 1555          Cognition    Orientation Status (Cognition) oriented x 4  -CM     Row Name 03/30/23 1555          Safety Issues, Functional Mobility    Impairments Affecting Function (Mobility) balance;endurance/activity tolerance;range of motion (ROM);strength  -CM           User Key  (r) = Recorded By, (t) = Taken By, (c) = Cosigned By    Initials Name Provider Type    CM Liz Wong OT Occupational Therapist                 Mobility/ADL's     Row Name 03/30/23 7970          Bed Mobility    Bed Mobility supine-sit;sit-supine  -CM     Supine-Sit [place] (Bed Mobility) standby assist  -CM     Sit-Supine [place] (Bed Mobility) standby assist  -CM     Row Name 03/30/23 6951          Transfers    Transfers sit-stand transfer;stand-sit transfer  -CM     Row Name 03/30/23 155          Sit-Stand Transfer    Sit-Stand [place] (Transfers) contact guard  -CM     Assistive Device (Sit-Stand Transfers) walker, front-wheeled  -CM     Row Name 03/30/23 1553          Stand-Sit Transfer    Stand-Sit [place] (Transfers) contact guard  -CM     Assistive Device (Stand-Sit Transfers) walker, front-wheeled  -CM     Row Name 03/30/23 1550          Mobility     Right Lower Extremity (Weight-bearing Status) weight-bearing as tolerated (WBAT)  -CM           User Key  (r) = Recorded By, (t) = Taken By, (c) = Cosigned By    Initials Name Provider Type    Liz Mcgraw OT Occupational Therapist               Obj/Interventions     Row Name 03/30/23 [facility]5          Sensory Assessment (Somatosensory)    Sensory Assessment (Somatosensory) UE sensation intact  -CM     Row Name 03/30/23 [facility]5          Shoulder (Therapeutic Exercise)    Shoulder (Therapeutic Exercise) strengthening exercise  -CM     Shoulder Strengthening (Therapeutic Exercise) bilateral;flexion;extension;aBduction;aDduction;horizontal aBduction/aDduction;sitting;resistance band;red;10 repetitions;2 sets  -CM     Row Name 03/30/23 [facility]5          Elbow/Forearm (Therapeutic Exercise)    Elbow/Forearm (Therapeutic Exercise) strengthening exercise  -CM     Elbow/Forearm Strengthening (Therapeutic Exercise) bilateral;flexion;extension;sitting;resistance band;red;10 repetitions;2 sets  -CM     Row Name 03/30/23 [facility]5          Motor Skills    Therapeutic Exercise shoulder;elbow/forearm  -CM           User Key  (r) = Recorded By, (t) = Taken By, (c) = Cosigned By    Initials Name Provider Type    Liz Mcgraw OT Occupational Therapist               Goals/Plan    No documentation.                Clinical Impression     Row Name 03/30/23 1555          Pain Assessment    Pretreatment Pain Rating 0/10 - no pain  -CM     Posttreatment Pain Rating 0/10 - no pain  -CM     Row Name 03/30/23 [facility]          Plan of Care Review    Plan of Care Reviewed With patient  -CM     Outcome Evaluation OT tx completed. Pt supine in bed upon arrival. no pain. Pt deferred a lot of mobility d/t just walking with PT. Pt agreeable to BUE therrapeutic exercises. Pt completed sup <> sit with SBA with HOB up and use of bed rail. Pt with good sitting balance. Pt compelted BUE exercises with red therraband in all planes. Pt compelted sit <> stand with  CGA and FWW. Pt completed sidestepps towards the HOB with CGA and FWW. Pt returned to supine in SBA. Pt left supine with all needs in reach and exit alarm on. Cont OT POC. No goals met  -CM     Row Name 03/30/23 1554          Therapy Assessment/Plan (OT)    Rehab Potential (OT) good, to achieve stated therapy goals  -CM     Criteria for Skilled Therapeutic Interventions Met (OT) yes;skilled treatment is necessary  -CM     Therapy Frequency (OT) daily  -CM     Row Name 03/30/23 1555          Therapy Plan Review/Discharge Plan (OT)    Anticipated Discharge Disposition (OT) inpatient rehabilitation facility;skilled nursing facility  -CM     Row Name 03/30/23 1556          Positioning and Restraints    Pre-Treatment Position in bed  -CM     Post Treatment Position bed  -CM     In Bed supine;call light within reach;encouraged to call for assist;exit alarm on;side rails up x2  -CM           User Key  (r) = Recorded By, (t) = Taken By, (c) = Cosigned By    Initials Name Provider Type    Liz Mcgraw, OT Occupational Therapist               Outcome Measures     Row Name 03/30/23 3385          How much help from another is currently needed...    Putting on and taking off regular lower body clothing? 2  -CM     Bathing (including washing, rinsing, and drying) 2  -CM     Toileting (which includes using toilet bed pan or urinal) 3  -CM     Putting on and taking off regular upper body clothing 4  -CM     Taking care of personal grooming (such as brushing teeth) 4  -CM     Eating meals 4  -CM     AM-PAC 6 Clicks Score (OT) 19  -CM     Row Name 03/30/23 1450 03/30/23 0859       How much help from another person do you currently need...    Turning from your back to your side while in flat bed without using bedrails? 3  -JALEN 3  -JALEN    Moving from lying on back to sitting on the side of a flat bed without bedrails? 3  -JALEN 3  -JALEN    Moving to and from a bed to a chair (including a wheelchair)? 3  -JALEN 3  -JALEN    Standing up from a  chair using your arms (e.g., wheelchair, bedside chair)? 3  -JALEN 3  -JALEN    Climbing 3-5 steps with a railing? 2  -JALEN 2  -JALEN    To walk in hospital room? 3  -JALEN 3  -JALEN    AM-PAC 6 Clicks Score (PT) 17  -JALEN 17  -JALEN    Highest level of mobility 5 --> Static standing  -JALEN 5 --> Static standing  -JALEN    Row Name 03/30/23 0850          How much help from another person do you currently need...    Turning from your back to your side while in flat bed without using bedrails? 3  -JH     Moving from lying on back to sitting on the side of a flat bed without bedrails? 3  -JH     Moving to and from a bed to a chair (including a wheelchair)? 3  -JH     Standing up from a chair using your arms (e.g., wheelchair, bedside chair)? 3  -JH     Climbing 3-5 steps with a railing? 2  -JH     To walk in hospital room? 3  -JH     AM-PAC 6 Clicks Score (PT) 17  -JH     Highest level of mobility 5 --> Static standing  -JH     Row Name 03/30/23 1555 03/30/23 1450       Functional Assessment    Outcome Measure Options AM-PAC 6 Clicks Daily Activity (OT)  -CM AM-PAC 6 Clicks Basic Mobility (PT)  -JALEN    Row Name 03/30/23 0859          Functional Assessment    Outcome Measure Options AM-PAC 6 Clicks Basic Mobility (PT)  -JALEN           User Key  (r) = Recorded By, (t) = Taken By, (c) = Cosigned By    Initials Name Provider Type    Jackson Hodgson PTA Physical Therapist Assistant    Alisia Tyson LPN Licensed Nurse    Liz Mcgraw OT Occupational Therapist                Occupational Therapy Education     Title: PT OT SLP Therapies (In Progress)     Topic: Occupational Therapy (In Progress)     Point: ADL training (Done)     Description:   Instruct learner(s) on proper safety adaptation and remediation techniques during self care or transfers.   Instruct in proper use of assistive devices.              Learning Progress Summary           Patient Acceptance, E,TB, VU,NR by  at 3/28/2023 9845    Comment: POC, role of OT, transfer  training                   Point: Home exercise program (Not Started)     Description:   Instruct learner(s) on appropriate technique for monitoring, assisting and/or progressing therapeutic exercises/activities.              Learner Progress:  Not documented in this visit.          Point: Precautions (Not Started)     Description:   Instruct learner(s) on prescribed precautions during self-care and functional transfers.              Learner Progress:  Not documented in this visit.          Point: Body mechanics (Not Started)     Description:   Instruct learner(s) on proper positioning and spine alignment during self-care, functional mobility activities and/or exercises.              Learner Progress:  Not documented in this visit.                      User Key     Initials Effective Dates Name Provider Type Discipline     06/14/21 -  Triny Mcdonald, OT Occupational Therapist OT              OT Recommendation and Plan  Therapy Frequency (OT): daily  Plan of Care Review  Plan of Care Reviewed With: patient  Outcome Evaluation: OT tx completed. Pt supine in bed upon arrival. no pain. Pt deferred a lot of mobility d/t just walking with PT. Pt agreeable to BUE therrapeutic exercises. Pt completed sup <> sit with SBA with HOB up and use of bed rail. Pt with good sitting balance. Pt compelted BUE exercises with red therraband in all planes. Pt compelted sit <> stand with CGA and FWW. Pt completed sidestepps towards the HOB with CGA and FWW. Pt returned to supine in SBA. Pt left supine with all needs in reach and exit alarm on. Cont OT POC. No goals met     Time Calculation:    Time Calculation- OT     Row Name 03/30/23 1620 03/30/23 1514 03/30/23 1232       Time Calculation- OT    OT Start Time 1555  -CM -- --    OT Stop Time 1620  -CM -- --    OT Time Calculation (min) 25 min  -CM -- --    Total Timed Code Minutes- OT 25 minute(s)  -CM -- --    OT Received On 03/30/23  -CM -- --       Timed Charges    41753 - OT  Therapeutic Exercise Minutes 15  -CM -- --    09035 - Gait Training Minutes  -- 9  -JALEN 15  -JALEN    09272 - OT Therapeutic Activity Minutes 10  -CM -- --       Total Minutes    Timed Charges Total Minutes 25  -CM 9  -JALEN 15  -JALEN     Total Minutes 25  -CM 9  -JALEN 15  -JALEN          User Key  (r) = Recorded By, (t) = Taken By, (c) = Cosigned By    Initials Name Provider Type    JALEN Jackson Pearson PTA Physical Therapist Assistant    CM Liz Wong OT Occupational Therapist              Therapy Charges for Today     Code Description Service Date Service Provider Modifiers Qty    [id number]  OT THERAPEUTIC ACT EA 15 MIN 3/30/2023 Liz Wong OT GO 1    [id number]  OT THER PROC EA 15 MIN 3/30/2023 Liz Wong OT GO 1               Liz Wong OT  3/30/2023

## 2023-03-30 NOTE — PLAN OF CARE
Goal Outcome Evaluation:           Progress: no change  Outcome Evaluation: PRN bp meds given. Pain controled with PRN meds. no acute compaints. Dressing CDI, pedal pulse and cap refill present in RLE.

## 2023-03-30 NOTE — THERAPY TREATMENT NOTE
Acute Care - Physical Therapy Treatment Note  [facility]     Patient Name: Rosaura Salgado  : 1940  MRN: [id number]  Today's Date: 3/30/2023      Visit Dx:     ICD-10-CM ICD-9-CM   1. Closed fracture of neck of right femur, initial encounter ([facility])  S72.001A 820.8   2. Leukopenia, unspecified type  D72.819 288.50   3. Thrombocytopenia ([facility])  D69.6 287.5   4. Hypokalemia  E87.6 276.8   5. Bradycardia  R00.1 427.89   6. Traumatic closed displaced fracture of neck of right femur, initial encounter ([facility])  S72.001A 820.8   7. Carotid stenosis, asymptomatic, bilateral  I65.23 433.10     433.30   8. Primary biliary cirrhosis  K74.3 571.6   9. Essential hypertension  I10 401.9   10. Atherosclerosis of native coronary artery of native heart without angina pectoris  I25.10 414.01   11. 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)  K74.3 571.6   12. Impaired mobility and ADLs  Z74.09 V49.89    Z78.9    13. Impaired functional mobility, balance, gait, and endurance  Z74.09 V49.89     Patient Active Problem List   Diagnosis   • Coronary atherosclerosis of native coronary artery   • Pulmonary embolus ([facility])   • Pneumonia   • Fibromyalgia   • Mixed hyperlipidemia   • Essential hypertension   • Primary biliary cirrhosis   • Carotid stenosis, asymptomatic, bilateral   • Atherosclerosis of artery of extremity with intermittent claudication ([facility])   • Traumatic closed displaced fracture of neck of right femur, initial encounter ([facility])   • Thrombocytopenia ([facility])   • Closed fracture of neck of right femur, initial encounter ([facility])   • Moderate malnutrition ([facility])     Past Medical History:   Diagnosis Date   • Allergic rhinitis    • Coronary atherosclerosis of native coronary artery    • Depression    • Fibromyalgia    • GERD (gastroesophageal reflux disease)    • Hyperlipidemia    • Hypertension    • On anticoagulant therapy    • Peptic ulcer    • Peripheral vascular disease, unspecified ([facility])    • Pneumonia    • Pulmonary embolus  (HCC)      Past Surgical History:   Procedure Laterality Date   • CAROTID ENDARTERECTOMY Left    • COLONOSCOPY N/A 2/1/2019    Procedure: COLONOSCOPY;  Surgeon: Maurice Carmona DO;  Location: [facility] ENDOSCOPY;  Service: Gastroenterology   • ENDOSCOPY N/A 2/1/2019    Procedure: ESOPHAGOGASTRODUODENOSCOPY;  Surgeon: Maurice Carmona DO;  Location: [facility] ENDOSCOPY;  Service: Gastroenterology   • KNEE SURGERY       PT Assessment (last 12 hours)     PT Evaluation and Treatment     Row Name 03/30/23 1450 03/30/23 0859       Physical Therapy Time and Intention    Document Type therapy note (daily note)  -JALEN therapy note (daily note)  -JALEN    Mode of Treatment physical therapy;individual therapy  -JALEN physical therapy;individual therapy  -JALEN    Patient Effort good  -JALEN good  -JALEN    Comment -- --  -JALEN    Row Name 03/30/23 1450 03/30/23 0859       General Information    Patient Profile Reviewed yes  -JALEN yes  -JALEN    Existing Precautions/Restrictions fall  -JALEN fall  -JALEN    Row Name 03/30/23 1450 03/30/23 0859       Pain    Pretreatment Pain Rating 0/10 - no pain  -JALEN 0/10 - no pain  -JALEN    Posttreatment Pain Rating 0/10 - no pain  -JALEN 0/10 - no pain  -JALEN    Row Name 03/30/23 1450 03/30/23 0859       Cognition    Affect/Mental Status (Cognition) WFL  -JALEN WFL  -JALEN    Orientation Status (Cognition) oriented x 4  -JALEN oriented x 4  -JALEN    Follows Commands (Cognition) WFL  -JALEN WFL  -JALEN    Personal Safety Interventions fall prevention program maintained;gait belt;muscle strengthening facilitated;nonskid shoes/slippers when out of bed;supervised activity  - fall prevention program maintained;gait belt;muscle strengthening facilitated;nonskid shoes/slippers when out of bed;supervised activity  -JALEN    Row Name 03/30/23 1450 03/30/23 0859       Mobility    Right Lower Extremity (Weight-bearing Status) weight-bearing as tolerated (WBAT)  -JALEN weight-bearing as tolerated (WBAT)  -JALEN    Row Name 03/30/23 1450 03/30/23 0859        Transfers    Transfers stand-sit transfer;sit-stand transfer;chair-bed transfer  -JALEN stand-sit transfer;sit-stand transfer;toilet transfer  -JALEN    Row Name 03/30/23 1450          Chair-Bed Transfer    Chair-Bed [place] (Transfers) contact guard  -JALEN     Assistive Device (Chair-Bed Transfers) walker, front-wheeled  -JALEN     Row Name 03/30/23 1450 03/30/23 0859       Sit-Stand Transfer    Sit-Stand [place] (Transfers) contact guard  -JALEN minimum assist (75% patient effort);verbal cues  -JALEN    Assistive Device (Sit-Stand Transfers) walker, front-wheeled  -JALEN walker, front-wheeled  -JALEN    Row Name 03/30/23 1450 03/30/23 0859       Stand-Sit Transfer    Stand-Sit [place] (Transfers) contact guard  -JALEN minimum assist (75% patient effort);verbal cues  -JALEN    Assistive Device (Stand-Sit Transfers) walker, front-wheeled  -JALEN walker, front-wheeled  -JALEN    Row Name 03/30/23 1450 03/30/23 0859       Toilet Transfer    Type (Toilet Transfer) --  -JALEN sit-stand;stand-sit  -JALEN    [place] Level (Toilet Transfer) --  -JALEN minimum assist (75% patient effort)  -JALEN    Assistive Device (Toilet Transfer) --  -JALEN walker, front-wheeled  -JALEN    Row Name 03/30/23 1450 03/30/23 0859       Gait/Stairs (Locomotion)    Gait/Stairs Locomotion gait/ambulation independence;gait/ambulation assistive device;distance ambulated;gait pattern;gait deviations;maintains weight-bearing status  -JALEN gait/ambulation independence;gait/ambulation assistive device;distance ambulated;gait pattern;gait deviations;maintains weight-bearing status  -JALEN    [place] Level (Gait) contact guard  -JALEN minimum assist (75% patient effort)  -JALEN    Assistive Device (Gait) walker, front-wheeled  -JALEN walker, front-wheeled  -JALEN    Distance in Feet (Gait) 80  -JALEN 40  -JALEN    Pattern (Gait) step-to  -JALEN step-to  -JALEN    Deviations/Abnormal Patterns (Gait) antalgic;base of support, narrow;gait speed decreased;stride length decreased  -JALEN antalgic;base of support,  narrow;gait speed decreased;stride length decreased  -JALEN    Bilateral Gait Deviations forward flexed posture  -JALEN forward flexed posture  -JALEN    Row Name 03/30/23 1450 03/30/23 0859       Motor Skills    Therapeutic Exercise --  ankle DF/PF, QS, GS, hip Ab/Ad, HS, SAQ- 15x1 tonja AROM. reinforced HEP.  -JALEN --  issued and edu on HEP. pt demonstrated understanding  -JALEN    Row Name             Wound 03/28/23 1320 Right hip Incision    Wound - Properties Group Placement Date: 03/28/23 -EW Placement Time: 1320  -EW Side: Right  -EW Location: hip  -EW Primary Wound Type: Incision  -EW    Retired Wound - Properties Group Placement Date: 03/28/23 -EW Placement Time: 1320  -EW Side: Right  -EW Location: hip  -EW Primary Wound Type: Incision  -EW    Retired Wound - Properties Group Date first assessed: 03/28/23 -EW Time first assessed: 1320  -EW Side: Right  -EW Location: hip  -EW Primary Wound Type: Incision  -EW    Row Name             Wound 03/28/23 1350 sacral spine Pressure Injury    Wound - Properties Group Placement Date: 03/28/23 -EW Placement Time: 1350  -EW Location: sacral spine  -EW Primary Wound Type: Pressure inj  -EW    Retired Wound - Properties Group Placement Date: 03/28/23 -EW Placement Time: 1350  -EW Location: sacral spine  -EW Primary Wound Type: Pressure inj  -EW    Retired Wound - Properties Group Date first assessed: 03/28/23 -EW Time first assessed: 1350  -EW Location: sacral spine  -EW Primary Wound Type: Pressure inj  -EW    Row Name             Wound 03/28/23 1351 thoracic spine Pressure Injury    Wound - Properties Group Placement Date: 03/28/23 -EW Placement Time: 1351  -EW Present on Hospital Admission: N  -EW Location: thoracic spine  -EW Primary Wound Type: Pressure inj  -EW    Retired Wound - Properties Group Placement Date: 03/28/23 -EW Placement Time: 1351  -EW Present on Hospital Admission: N  -EW Location: thoracic spine  -EW Primary Wound Type: Pressure inj  -EW    Retired Wound -  Properties Group Date first assessed: 03/28/23  -EW Time first assessed: 1351 -EW Present on Hospital Admission: N  -EW Location: thoracic spine  -EW Primary Wound Type: Pressure inj  -EW    Row Name 03/30/23 1450 03/30/23 0859       Vital Signs    Pre Systolic BP Rehab 160  -JALEN 178  taken manually. nsg approves tx. BP meds given  -JALEN    Pre Treatment Diastolic BP 92  -JALEN 68  -JALEN    Post Systolic BP Rehab 166  -JALEN 143  -JALEN    Post Treatment Diastolic BP 53  -JALEN 63  -JALEN    Pretreatment Heart Rate (beats/min) 80  -JALEN 82  -JALEN    Posttreatment Heart Rate (beats/min) 81  -JALEN 81  -JALEN    Pre SpO2 (%) 96  -JALEN 98  -JALEN    O2 Delivery Pre Treatment room air  -JALEN room air  -JALEN    Post SpO2 (%) 97  -JALEN 97  -JALEN    O2 Delivery Post Treatment room air  -JALEN room air  -JALEN    Pre Patient Position Sitting  -JALEN Sitting  -JALEN    Post Patient Position Supine  -JALEN Sitting  -JALEN    Row Name 03/30/23 1450 03/30/23 0859       Positioning and Restraints    Pre-Treatment Position in bed  -JALEN in bed  -JALEN    Post Treatment Position bed  -JALEN bed  -JALEN    In Bed fowlers;call light within reach;encouraged to call for assist;exit alarm on  all needs met  - fowlers;call light within reach;encouraged to call for assist;exit alarm on  -JALEN    Row Name 03/30/23 1450 03/30/23 0859       Therapy Assessment/Plan (PT)    Rehab Potential (PT) good, to achieve stated therapy goals  - good, to achieve stated therapy goals  -    Row Name 03/30/23 1450 03/30/23 0859       Bed Mobility Goal 1 (PT)    Activity/Assistive Device (Bed Mobility Goal 1, PT) bed mobility activities, all  -JALEN bed mobility activities, all  -JALEN    [place] Level/Cues Needed (Bed Mobility Goal 1, PT) independent  -JALEN independent  -JALEN    Time Frame (Bed Mobility Goal 1, PT) by discharge  -JALEN by discharge  -JALEN    Progress/Outcomes (Bed Mobility Goal 1, PT) goal not met  -JALEN goal not met  -JALEN    Row Name 03/30/23 1450 03/30/23 0859       Transfer Goal 1 (PT)    Activity/Assistive Device  (Transfer Goal 1, PT) sit-to-stand/stand-to-sit;bed-to-chair/chair-to-bed  -JALEN sit-to-stand/stand-to-sit;bed-to-chair/chair-to-bed  -JALEN    [place] Level/Cues Needed (Transfer Goal 1, PT) modified independence  -JALEN modified independence  -JALEN    Time Frame (Transfer Goal 1, PT) by discharge  -JALEN by discharge  -JALEN    Progress/Outcome (Transfer Goal 1, PT) goal not met  -JALEN goal not met  -JALEN    Row Name 03/30/23 1450 03/30/23 0859       Gait Training Goal 1 (PT)    Activity/Assistive Device (Gait Training Goal 1, PT) gait (walking locomotion);decrease fall risk;increase endurance/gait distance  -JALEN gait (walking locomotion);decrease fall risk;increase endurance/gait distance  -JALEN    [place] Level (Gait Training Goal 1, PT) modified independence;standby assist  -JALEN modified independence;standby assist  -JALEN    Distance (Gait Training Goal 1, PT) 150ft or more each trip  -JALEN 150ft or more each trip  -JALEN    Time Frame (Gait Training Goal 1, PT) by discharge  -JALEN by discharge  -JALEN    Progress/Outcome (Gait Training Goal 1, PT) goal not met  -JALEN goal not met  -JALEN    Row Name 03/30/23 1450 03/30/23 0859       ROM Goal 1 (PT)    ROM Goal 1 (PT) Pt will improve LE range so she is able to raise/lower BLE on/off bed for supine to sit to supine without assistance  -JALEN Pt will improve LE range so she is able to raise/lower BLE on/off bed for supine to sit to supine without assistance  -JALEN    Time Frame (ROM Goal 1, PT) by discharge  -JALEN by discharge  -JALEN    Progress/Outcome (ROM Goal 1, PT) goal not met  -JALEN goal not met  -JALEN    Row Name 03/30/23 1450 03/30/23 0859       Stairs Goal 1 (PT)    Activity/Assistive Device (Stairs Goal 1, PT) ascending stairs;descending stairs;using handrail, left;using handrail, right  -JALEN ascending stairs;descending stairs;using handrail, left;using handrail, right  -JALEN    [place] Level/Cues Needed (Stairs Goal 1, PT) modified independence  -JALEN modified independence  -JALEN    Number of  Stairs (Stairs Goal 1, PT) 5  -JALEN 5  -JALEN    Time Frame (Stairs Goal 1, PT) 1 week  -JALEN 1 week  -JALEN    Progress/Outcome (Stairs Goal 1, PT) goal not met  -JALEN goal not met  -          User Key  (r) = Recorded By, (t) = Taken By, (c) = Cosigned By    Initials Name Provider Type    JALEN Jackson Pearson PTA Physical Therapist Assistant    Margie Kirby RN Registered Nurse                Physical Therapy Education     Title: PT OT SLP Therapies (In Progress)     Topic: Physical Therapy (In Progress)     Point: Mobility training (In Progress)     Learning Progress Summary           Patient Acceptance, E, NR by  at 3/30/2023 1506    Acceptance, E, NR by  at 3/30/2023 1232    Acceptance, E, NR by  at 3/30/2023 0933    Acceptance, E, NR by [facility] at 3/28/2023 1829                   Point: Home exercise program (Not Started)     Learner Progress:  Not documented in this visit.          Point: Body mechanics (In Progress)     Learning Progress Summary           Patient Acceptance, E, NR by [facility] at 3/28/2023 1829                   Point: Precautions (In Progress)     Learning Progress Summary           Patient Acceptance, E, NR by [facility] at 3/28/2023 1829                               User Key     Initials Effective Dates Name Provider Type Discipline    [facility] 06/16/21 -  Lisseth Browning, PT Physical Therapist PT     06/16/21 -  Jackson Pearson PTA Physical Therapist Assistant PT              PT Recommendation and Plan  Anticipated Discharge Disposition (PT): inpatient rehabilitation facility, skilled nursing facility  Plan of Care Reviewed With: patient  Progress: improving  Outcome Evaluation: BID tx completed. pt responded well to PT w/ increased gait to 80 ft CGA w/ RW. pt participated in LE ther ex and edu on HEP. no new goals met at this time. pt would continue to benefit from PT services.   Outcome Measures     Row Name 03/30/23 1450 03/30/23 0859          How much help from another person do you currently need...     Turning from your back to your side while in flat bed without using bedrails? 3  -JALEN 3  -JALEN     Moving from lying on back to sitting on the side of a flat bed without bedrails? 3  -JALEN 3  -JALEN     Moving to and from a bed to a chair (including a wheelchair)? 3  -JALEN 3  -JALEN     Standing up from a chair using your arms (e.g., wheelchair, bedside chair)? 3  -JALEN 3  -JALEN     Climbing 3-5 steps with a railing? 2  -JALEN 2  -JALEN     To walk in hospital room? 3  -JALEN 3  -JALEN     AM-PAC 6 Clicks Score (PT) 17  -JALEN 17  -JALEN        Functional Assessment    Outcome Measure Options AM-PAC 6 Clicks Basic Mobility (PT)  -JALEN AM-PAC 6 Clicks Basic Mobility (PT)  -JALEN           User Key  (r) = Recorded By, (t) = Taken By, (c) = Cosigned By    Initials Name Provider Type    JALEN Jackson Pearson PTA Physical Therapist Assistant                 Time Calculation:    PT Charges     Row Name 03/30/23 1514 03/30/23 1232          Time Calculation    Start Time 1450  - 0859  -     Stop Time 1514  - 0952  -     Time Calculation (min) 24 min  - 53 min  -        Time Calculation- PT    Total Timed Code Minutes- PT 24 minute(s)  - 53 minute(s)  -        Timed Charges    33356 - PT Therapeutic Exercise Minutes 15  -JALEN --     77880 - Gait Training Minutes  9  -JALEN 15  -     09227 - PT Therapeutic Activity Minutes -- 23  -     08936 - PT Self Care/Mgmt Minutes -- 15  -        Total Minutes    Timed Charges Total Minutes 24  - 53  -      Total Minutes 24  - 53  -           User Key  (r) = Recorded By, (t) = Taken By, (c) = Cosigned By    Initials Name Provider Type    JALEN Jackson Pearson PTA Physical Therapist Assistant              Therapy Charges for Today     Code Description Service Date Service Provider Modifiers Qty    18387374938 HC GAIT TRAINING EA 15 MIN 3/30/2023 Jackson Pearson, TIM GP 1    13254837288 HC PT THERAPEUTIC ACT EA 15 MIN 3/30/2023 Jackson Pearson PTA GP 2    04568464946 HC PT SELF CARE/MGMT/TRAIN EA 15  MIN 3/30/2023 Jackson Pearson, PTA GP 1    18086862386 HC GAIT TRAINING EA 15 MIN 3/30/2023 Jackson Pearson, PTA GP 1    52826206195 HC PT THER PROC EA 15 MIN 3/30/2023 Jackson Pearson, PTA GP 1          PT G-Codes  Outcome Measure Options: AM-PAC 6 Clicks Basic Mobility (PT)  AM-PAC 6 Clicks Score (PT): 17  AM-PAC 6 Clicks Score (OT): 18    Jackson Pearson PTA  3/30/2023

## 2023-03-30 NOTE — PROGRESS NOTES
" [facility]     Progress Note    Patient Name: Rosaura Salgado  : 1940  MRN: [id number]  Primary Care Physician:  Jin Dumont MD  Date of admission: 3/27/2023    Subjective   Subjective     Chief Complaint: POD2 status post right hip bipolar anterior approach    History of Present Illness  Patient Reports having bowel movements,fever of 100.5 last night (none since), slight cough but no SOA, some tenderness of the distal lateral thigh (patient reports it is improving), sleeping and eating back to baseline, ambulation with PT/OT.    - Overall patient appeared to be doing well and does show significant signs of infection or illness. Has trace edema of lower extremities, and slight tenderness of lateral thigh.    Review of Systems   Constitutional: Negative for chills and fever.   Respiratory: Positive for cough (minimal, says she feels \"dried out\"). Negative for apnea, chest tightness, shortness of breath and wheezing.    Cardiovascular: Negative for chest pain and palpitations.   Gastrointestinal: Negative for abdominal distention, abdominal pain, constipation, diarrhea, nausea and vomiting.   Musculoskeletal:        Slight tenderness of lateral aspect of R thigh   Psychiatric/Behavioral: Negative for sleep disturbance.       Objective   Objective     Vitals:   Temp:  [98 °F (36.7 °C)-100.5 °F (38.1 °C)] 98.8 °F (37.1 °C)  Heart Rate:  [73-87] 78  Resp:  [16-18] 18  BP: (127-193)/(62-82) 184/62    Physical Exam  Constitutional:       General: She is not in acute distress.     Appearance: Normal appearance. She is not ill-appearing.   Cardiovascular:      Rate and Rhythm: Normal rate and regular rhythm.   Pulmonary:      Effort: Pulmonary effort is normal. No respiratory distress.      Breath sounds: Normal breath sounds.   Abdominal:      General: Abdomen is flat. There is no distension.      Palpations: Abdomen is soft. There is no mass.      Tenderness: There is no abdominal " tenderness.   Musculoskeletal:         General: Tenderness present.      Right upper leg: Tenderness (some tenderness of lateral thigh (reports it has been improving)) present.      Right lower leg: No tenderness. No edema.      Left lower leg: No tenderness. No edema.        Legs:    Neurological:      Mental Status: She is alert.          Result Review    Result Review:  I have personally reviewed the results from the time of this admission to 3/30/2023 06:16 CDT and agree with these findings:  [x]  Laboratory list / accordion  []  Microbiology  []  Radiology  []  EKG/Telemetry   []  Cardiology/Vascular   []  Pathology  []  Old records  []  Other:  Most notable findings include:  Results from last 7 days   Lab Units 03/29/23  0747 03/28/23  0548 03/27/23  0919   WBC 10*3/mm3 8.25 10.54 2.40*   HEMOGLOBIN g/dL 10.7* 13.0 10.0*   HEMATOCRIT % 32.0* 39.4 30.3*   PLATELETS 10*3/mm3 93* 85* 40*     Results from last 7 days   Lab Units 03/29/23  0747 03/28/23  0548 03/27/23 2113 03/27/23  0919   SODIUM mmol/L 136 135*  --  139   POTASSIUM mmol/L 3.7 4.1 3.4* 2.8*   CHLORIDE mmol/L 108* 107  --  107   CO2 mmol/L 18.0* 15.0*  --  23.0   BUN mg/dL 23 12  --  11   CREATININE mg/dL 1.28* 0.98  --  1.10*   CALCIUM mg/dL 8.1* 8.4*  --  8.0*   BILIRUBIN mg/dL 1.4* 2.3*  --  1.0   ALK PHOS U/L 311* 395*  --  385*   ALT (SGPT) U/L 18 30  --  30   AST (SGOT) U/L 36* 47*  --  46*   GLUCOSE mg/dL 184* 126*  --  126*     - Hgb decreased from 13.0 to 10.7  - Creatinine has increased from 0.98 to 1.28  - adequate UOP  - Platelets up to 93    Assessment & Plan   Assessment / Plan     Brief Patient Summary:  Rosaura Salgado is a 82 y.o. female who is status post bipolar right hip replacement and doing well.    Active Hospital Problems:  Active Hospital Problems    Diagnosis    • **Traumatic closed displaced fracture of neck of right femur, initial encounter ([facility])    • Moderate malnutrition ([facility])    • Closed fracture of neck of  right femur, initial encounter ([facility])    • Thrombocytopenia (H[facility])    • Carotid stenosis, asymptomatic, bilateral    • Essential hypertension    • Primary biliary cirrhosis    • Coronary atherosclerosis of native coronary artery      Plan:   1. Encourage ambulation and movement as tolerated  2. Continue DVT prophylaxis (Lovenox started per primary)  3. Continue wound care and pain control measures    DVT prophylaxis:  Medical and mechanical DVT prophylaxis orders are present.      Deandre Long, Medical Student      As above.    See my full note from the day.  Electronically signed by Juan Carlos Jesus MD on 03/30/23 at 07:46 CDT

## 2023-03-30 NOTE — PROGRESS NOTES
Patient seen and examined in conjunction with the medical student, please refer to my note for any pertinent details.    Marty Gloria MD             [facility] Medicine Services  INPATIENT PROGRESS NOTE      Length of Stay: 2  Date of Admission: 3/27/2023  Primary Care Physician: Jin Dumont MD    Subjective   Chief Complaint: Right hip pain  HPI:  Patient is an 82-year-old female with past medical history notable for CAD, primary biliary cirrhosis, thrombocytopenia, hypertension, and PVD.  Pt arrived at ED on 3/27 after a fall that morning. On questioning Pt  states that she was bent over and bumped her head. This resulted in a forward fall and the pt landed on her left shoulder and right hip. Pt also presented with a wound on the back of her head and skin tear on right elbow. Pt was unable to get off of the ground and EMS was called. Heart rate was in the 40s on arrival.     Right femur neck fracture was found on ED evaluation.     Pt underwent right hip bipolar anterior approach operation on 2/28.     Today pt reports minimal pain. She reports passing flatus and has had a bowel movements. She is able to ambulate with assistance from PT/OT.     Review of Systems   Constitutional: Negative.  Negative for appetite change and fever.   HENT: Negative.  Negative for ear pain and hearing loss.    Eyes: Negative.  Negative for pain.   Respiratory: Negative.  Negative for chest tightness.    Cardiovascular: Negative.  Negative for chest pain and palpitations.   Gastrointestinal: Negative.    Endocrine: Negative.    Genitourinary: Negative.    Musculoskeletal: Negative.    Skin: Negative.    Neurological: Negative.  Negative for seizures and headaches.   Hematological: Negative.    Psychiatric/Behavioral: Negative.    All pertinent negatives and positives are as above. All other systems have been reviewed and are negative unless otherwise stated.     Objective     As of today 03/30/23  Temp:  [98 °F (36.7 °C)-100.5 °F (38.1 °C)] 98.8 °F (37.1 °C)  Heart Rate:  [73-84] 79  Resp:  [18] 18  BP: (127-198)/(62-82) 198/74    Physical Exam  Constitutional:       General: She is not in acute distress.     Appearance: She is not toxic-appearing.   HENT:      Head: Normocephalic and atraumatic.      Right Ear: External ear normal.      Left Ear: External ear normal.      Nose: Nose normal.      Mouth/Throat:      Pharynx: Oropharynx is clear.   Eyes:      Conjunctiva/sclera: Conjunctivae normal.   Cardiovascular:      Rate and Rhythm: Normal rate and regular rhythm.      Heart sounds: Normal heart sounds.   Pulmonary:      Effort: Pulmonary effort is normal.      Breath sounds: Normal breath sounds.   Abdominal:      General: Bowel sounds are normal.      Palpations: Abdomen is soft.      Tenderness: There is no abdominal tenderness.   Musculoskeletal:         General: No deformity.   Skin:     General: Skin is warm and dry.      Capillary Refill: Capillary refill takes less than 2 seconds.   Neurological:      General: No focal deficit present.      Mental Status: She is alert and oriented to person, place, and time. Mental status is at baseline.   Psychiatric:         Behavior: Behavior normal.          Results Review:  I have reviewed the labs, radiology results, and diagnostic studies.    Laboratory Data:   Results from last 7 days   Lab Units 03/29/23  0747 03/28/23  0548 03/27/23  2113 03/27/23  0919   SODIUM mmol/L 136 135*  --  139   POTASSIUM mmol/L 3.7 4.1 3.4* 2.8*   CHLORIDE mmol/L 108* 107  --  107   CO2 mmol/L 18.0* 15.0*  --  23.0   BUN mg/dL 23 12  --  11   CREATININE mg/dL 1.28* 0.98  --  1.10*   GLUCOSE mg/dL 184* 126*  --  126*   CALCIUM mg/dL 8.1* 8.4*  --  8.0*   BILIRUBIN mg/dL 1.4* 2.3*  --  1.0   ALK PHOS U/L 311* 395*  --  385*   ALT (SGPT) U/L 18 30  --  30   AST (SGOT) U/L 36* 47*  --  46*   ANION GAP mmol/L 10.0 13.0  --  9.0     Estimated Creatinine  Clearance: 29.8 mL/min (A) (by C-G formula based on SCr of 1.28 mg/dL (H)).  Results from last 7 days   Lab Units 03/28/23  0110 03/27/23  0919   MAGNESIUM mg/dL 1.4* 1.7         Results from last 7 days   Lab Units 03/30/23  0636 03/29/23  0747 03/28/23  0548 03/27/23  0919   WBC 10*3/mm3 9.24 8.25 10.54 2.40*   HEMOGLOBIN g/dL 9.6* 10.7* 13.0 10.0*   HEMATOCRIT % 28.1* 32.0* 39.4 30.3*   PLATELETS 10*3/mm3 95* 93* 85* 40*     Results from last 7 days   Lab Units 03/27/23  0919   INR  1.23*       Culture Data:   No results found for: BLOODCX  No results found for: URINECX  No results found for: RESPCX  No results found for: WOUNDCX  No results found for: STOOLCX  No components found for: BODYFLD    Radiology Data:   Imaging Results (Last 24 Hours)     Procedure Component Value Units Date/Time    XR Knee 1 or 2 View Right [[id number]] Collected: 03/30/23 0724     Updated: 03/30/23 0821    Narrative:      PROCEDURE: Right knee x-rays with 2 views.    INDICATION: pain after hip surgery, S72.001A Fracture of  unspecified part of neck of right femur, initial encounter for  closed fracture D72.819 Decreased white blood cell count,  unspecified D69.6 Thrombocytopenia, unspecified E87.6 Hypokalemia  R00.1 Bradycardia, unspecified S72.001A Fracture of unspecified  part of neck of right femur, initial encounter for closed  fracture I65.23 Occlusion and stenosis of bila    No fracture or acute osseous abnormality in the right knee.  Mild narrowing of the medial lateral joint spaces and small  osteophytes along the medial femoral condylar region.    No joint effusion.    Heterogeneous bubbly and linear gas densities in the soft tissues  over the lateral aspect of the mid and distal thigh area. Could  be postoperative however infectious etiologies not excluded.      Impression:      No acute osseous abnormalities in the knee.  Degenerative changes medial and lateral joint space detailed  above.    Bubbly and linear gas densities  in the soft tissues over the  lateral aspect mid and distal thigh. Could be postoperative in  etiology however infectious etiology not excluded.    [id number]          Electronically signed by:  Jareth Rodríguez MD  3/30/2023 8:19  AM CDT Workstation: [id number]    XR Femur 2 View Right [[id number]] Collected: 03/30/23 0724     Updated: 03/30/23 0821    Narrative:      PROCEDURE: Right femur x-rays with 2 views.    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    FINDINGS:    There has been interval surgery with hip replacement now present.  The femoral head component is aligned with the acetabular  component. The components normally and seated in respective bony  elements.    Other than the recent hip surgery no acute osseous abnormalities  evident in the femur.    There are significant bubbly and linear gas densities in the soft  tissues over the lateral mid and distal thigh area. Could be  postoperative in nature however cannot exclude infectious  etiology.      Impression:      Right hip replacement normally aligned and normally seated bony  elements.    Significant bubbly and linear gas densities in the lateral mid  and distal thigh soft tissues of uncertain etiology. Could be  postoperative related surgery however cannot exclude underlying  infectious etiology include possibility of necrotizing fasciitis.    Findings personally discussed with Dr. Ann Marie Willett  assistant at 8:15 AM CDT at time of this dictation.      [id number]    Electronically signed by:  Jareth Rodríguez MD  3/30/2023  8:18  AM CDT Workstation: [id number]          I have reviewed the patient's current medications.     Assessment/Plan     Principal Problem:    Traumatic closed displaced fracture of neck of right femur, initial encounter ([facility])  Active Problems:    Coronary atherosclerosis of native coronary artery    Essential hypertension    Primary biliary cirrhosis    Carotid stenosis, asymptomatic, bilateral    Thrombocytopenia (HCC)    Closed fracture of neck of right femur, initial encounter ([facility])    Moderate malnutrition ([facility])    Treatment Plan  Fracture of the right femur  -Pt is post op day 2. With no complication. Continue to monitor and encourage ambulating with assistance.  -Pt's insurance will not cover ARU but will be unable to go to outpatient therapy. Pt could benefit from Skilled Nursing Facility stay with continued rehab before going home.     DVT prophylaxis  -Pt can start on anticoagulants as long as platelets stay above 50,000. Continue on enoxaparin. Anticoagulation can generally be given if the platelet count is ?50,000/microL. If the platelet count is <50,000/microL, reconsider anticoagulant use.    Hypertension  -Pt was given antihypertensive home meds at 4:47am. Bp continues to be 198/74.  -continue hydralazine as needed for high blood pressure. Hydralazine lowers blood pressure through arterial vasodilation.      Thrombocytopenia  -likely due to liver cirrhosis and decreased production of thrombopoietin. Pt given platelets prior to surgery   -Platelets continuing to improve.Continue to monitor platelets.          Chapo Ibrahim, Medical Student

## 2023-03-30 NOTE — PROGRESS NOTES
ORTHOPEDIC PROGRESS NOTE:    Name:  Rosaura Salgado  Date:    3/30/2023  Date of admission:  3/27/2023    Post op day:  2 Days Post-Op  Procedure:    Procedure(s) (LRB):  RIGHT HIP BIPOLAR ANTERIOR APPROACH (Right)    Subjective:  Pain is improving.  Doing well   She wants to go home.  She is reporting pain in right right thigh and distal femur.    Vitals:     Vitals:    23 0316   BP: (!) 184/62   Pulse:    Resp:    Temp:    SpO2:       Temp (24hrs), Av.1 °F (37.3 °C), Min:98 °F (36.7 °C), Max:100.5 °F (38.1 °C)      Exam:    Awake and alert  Toes up and down  Stable exam  Incision clean and dry  Tender in mid thigh and distal femur.  Tender with knee motion.  Good distal pulses and sensation    ASSESSMENT:  Active Hospital Problems    Diagnosis  POA   • **Traumatic closed displaced fracture of neck of right femur, initial encounter ([facility]) [S72.001A]  Yes   • Moderate malnutrition ([facility]) [E44.0]  Yes   • Closed fracture of neck of right femur, initial encounter ([facility]) [S72.001A]  Yes   • Thrombocytopenia ([facility]) [D69.6]  Yes     Added automatically from request for surgery [id number]     • Carotid stenosis, asymptomatic, bilateral [I65.23]  Yes   • Essential hypertension [I10]  Yes   • Primary biliary cirrhosis [K74.3]  Yes   • Coronary atherosclerosis of native coronary artery [I25.10]  Yes         PLAN:      Work with PT/OT  Patient desires to go home.  Will work towards discharge home with home health in 1-2 days.  DVT prophylaxis : mechanical per primary team  Will get xrays right knee and right femur to make sure there is no further injury present that would change disposition.    23 at 07:04 CDT by Juan Carlos Jesus MD

## 2023-03-30 NOTE — PLAN OF CARE
Goal Outcome Evaluation:  Plan of Care Reviewed With: patient           Outcome Evaluation: OT tx completed. Pt supine in bed upon arrival. no pain. Pt deferred a lot of mobility d/t just walking with PT. Pt agreeable to BUE therrapeutic exercises. Pt completed sup <> sit with SBA with HOB up and use of bed rail. Pt with good sitting balance. Pt compelted BUE exercises with red therraband in all planes. Pt compelted sit <> stand with CGA and FWW. Pt completed sidestepps towards the HOB with CGA and FWW. Pt returned to supine in SBA. Pt left supine with all needs in reach and exit alarm on. Cont OT POC. No goals met

## 2023-03-30 NOTE — PLAN OF CARE
Goal Outcome Evaluation:  Plan of Care Reviewed With: patient     Problem: Adult Inpatient Plan of Care  Goal: Plan of Care Review  Outcome: Ongoing, Progressing  Flowsheets (Taken 3/30/2023 0550)  Progress: improving  Plan of Care Reviewed With: patient  Outcome Evaluation: BID tx completed. pt responded well to PT w/ increased gait to 80 ft CGA w/ RW. pt participated in LE ther ex and edu on HEP. no new goals met at this time. pt would continue to benefit from PT services.

## 2023-03-30 NOTE — PROGRESS NOTES
[facility] Medicine Services  INPATIENT PROGRESS NOTE      Length of Stay: 2  Date of Admission: 3/27/2023  Primary Care Physician: Jin Dumont MD    Subjective   Chief Complaint: Hip pain  HPI: Doing well, pain controlled. BP intermittently elevated.     Review of Systems   All pertinent negatives and positives are as above. All other systems have been reviewed and are negative unless otherwise stated.     Objective    As of today 03/30/23  Temp:  [98.2 °F (36.8 °C)-100.5 °F (38.1 °C)] 99.8 °F (37.7 °C)  Heart Rate:  [74-87] 87  Resp:  [18] 18  BP: (137-198)/(61-82) 137/61    Physical Exam  Constitutional:       General: She is not in acute distress.     Appearance: She is not toxic-appearing.   HENT:      Head: Normocephalic and atraumatic.      Right Ear: External ear normal.      Left Ear: External ear normal.      Nose: Nose normal.      Mouth/Throat:      Pharynx: Oropharynx is clear.   Eyes:      Conjunctiva/sclera: Conjunctivae normal.   Cardiovascular:      Rate and Rhythm: Normal rate and regular rhythm.      Heart sounds: Normal heart sounds.   Pulmonary:      Effort: Pulmonary effort is normal.      Breath sounds: Normal breath sounds.   Abdominal:      General: Bowel sounds are normal.      Palpations: Abdomen is soft.      Tenderness: There is no abdominal tenderness.   Musculoskeletal:         General: No deformity.   Skin:     General: Skin is warm and dry.      Capillary Refill: Capillary refill takes less than 2 seconds.   Neurological:      General: No focal deficit present.      Mental Status: She is alert and oriented to person, place, and time. Mental status is at baseline.   Psychiatric:         Behavior: Behavior normal.           Results Review:  I have reviewed the labs, radiology results, and diagnostic studies.    Laboratory Data:   Results from last 7 days   Lab Units 03/29/23  0747 03/28/23  0548 03/27/23  3609  03/27/23  0919   SODIUM mmol/L 136 135*  --  139   POTASSIUM mmol/L 3.7 4.1 3.4* 2.8*   CHLORIDE mmol/L 108* 107  --  107   CO2 mmol/L 18.0* 15.0*  --  23.0   BUN mg/dL 23 12  --  11   CREATININE mg/dL 1.28* 0.98  --  1.10*   GLUCOSE mg/dL 184* 126*  --  126*   CALCIUM mg/dL 8.1* 8.4*  --  8.0*   BILIRUBIN mg/dL 1.4* 2.3*  --  1.0   ALK PHOS U/L 311* 395*  --  385*   ALT (SGPT) U/L 18 30  --  30   AST (SGOT) U/L 36* 47*  --  46*   ANION GAP mmol/L 10.0 13.0  --  9.0     Estimated Creatinine Clearance: 29.8 mL/min (A) (by C-G formula based on SCr of 1.28 mg/dL (H)).  Results from last 7 days   Lab Units 03/28/23  0110 03/27/23  0919   MAGNESIUM mg/dL 1.4* 1.7         Results from last 7 days   Lab Units 03/30/23  0636 03/29/23  0747 03/28/23  0548 03/27/23  0919   WBC 10*3/mm3 9.24 8.25 10.54 2.40*   HEMOGLOBIN g/dL 9.6* 10.7* 13.0 10.0*   HEMATOCRIT % 28.1* 32.0* 39.4 30.3*   PLATELETS 10*3/mm3 95* 93* 85* 40*     Results from last 7 days   Lab Units 03/27/23  0919   INR  1.23*       Culture Data:   No results found for: BLOODCX  No results found for: URINECX  No results found for: RESPCX  No results found for: WOUNDCX  No results found for: STOOLCX  No components found for: BODYFLD    Radiology Data:   Imaging Results (Last 24 Hours)     Procedure Component Value Units Date/Time    XR Knee 1 or 2 View Right [[id number]] Collected: 03/30/23 0724     Updated: 03/30/23 0821    Narrative:      PROCEDURE: Right knee x-rays with 2 views.    INDICATION: pain after hip surgery, S72.001A Fracture of  unspecified part of neck of right femur, initial encounter for  closed fracture D72.819 Decreased white blood cell count,  unspecified D69.6 Thrombocytopenia, unspecified E87.6 Hypokalemia  R00.1 Bradycardia, unspecified S72.001A Fracture of unspecified  part of neck of right femur, initial encounter for closed  fracture I65.23 Occlusion and stenosis of bila    No fracture or acute osseous abnormality in the right knee.  Mild  narrowing of the medial lateral joint spaces and small  osteophytes along the medial femoral condylar region.    No joint effusion.    Heterogeneous bubbly and linear gas densities in the soft tissues  over the lateral aspect of the mid and distal thigh area. Could  be postoperative however infectious etiologies not excluded.      Impression:      No acute osseous abnormalities in the knee.  Degenerative changes medial and lateral joint space detailed  above.    Bubbly and linear gas densities in the soft tissues over the  lateral aspect mid and distal thigh. Could be postoperative in  etiology however infectious etiology not excluded.    [id number]          Electronically signed by:  Jareth Rodríguez MD  3/30/2023 8:19  AM CDT Workstation: [id number]    XR Femur 2 View Right [[id number]] Collected: 03/30/23 0724     Updated: 03/30/23 0821    Narrative:      PROCEDURE: Right femur x-rays with 2 views.    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    FINDINGS:    There has been interval surgery with hip replacement now present.  The femoral head component is aligned with the acetabular  component. The components normally and seated in respective bony  elements.    Other than the recent hip surgery no acute osseous abnormalities  evident in the femur.    There are significant bubbly and linear gas densities in the soft  tissues over the lateral mid and distal thigh area. Could be  postoperative in nature however cannot exclude infectious  etiology.      Impression:      Right hip replacement normally  aligned and normally seated bony  elements.    Significant bubbly and linear gas densities in the lateral mid  and distal thigh soft tissues of uncertain etiology. Could be  postoperative related surgery however cannot exclude underlying  infectious etiology include possibility of necrotizing fasciitis.    Findings personally discussed with Analia Jeong, Dr. Jesus  assistant at 8:15 AM CDT at time of this dictation.      [id number]    Electronically signed by:  Jareth Rodríguez MD  3/30/2023 8:18  AM CDT Workstation: [id number]          I have reviewed the patient's current medications.     Assessment/Plan     Principal Problem:    Traumatic closed displaced fracture of neck of right femur, initial encounter ([facility])  Active Problems:    Coronary atherosclerosis of native coronary artery    Essential hypertension    Primary biliary cirrhosis    Carotid stenosis, asymptomatic, bilateral    Thrombocytopenia (HCC)    Closed fracture of neck of right femur, initial encounter ([facility])    Moderate malnutrition ([facility])    Plan:  - Continue routine postop care, appreciate orthopedic surgery assistance  - PT and OT already consulted  - Thrombocytopenia improved/stable today, continue to monitor for any signs of worsening.  - Recommendations reviewed on Up to Date regarding AC with her thrombocytopenia. Okay to give as long as platelet count remains 50K or greater so will start Lovenox.   - Continue home medications as appropriate  - Plan for home with HH tomorrow.  - Coreg increased, Amlodipine and Losartan added to help with BP control  - DVT prophylaxis with Lovenox  - CODE STATUS: Full    Medical Decision Making  Number and Complexity of problems: 4 high complexity    Conditions and Status:        Condition is improving.     [facility] Data  External documents reviewed: Previous records    Tests considered but not ordered: None     Decision rules/scores evaluated (example [id number], [place], etc): None     Discussed with: Patient      Treatment Plan  As above    Care Planning  Shared decision making: Patient  Code status and discussions: Full    Disposition  Social Determinants of Health that impact treatment or disposition: None  I expect the patient to be discharged to home with home health likely tomorrow.       I have utilized all available immediate resources to obtain, update, or review the patient's current medications (including all prescriptions, over-the-counter products, herbals, cannabis/cannabidiol products, and vitamin/mineral/dietary (nutritional) supplements).   I confirmed that the patient's Advance Care Plan is present, code status is documented, or surrogate decision maker is listed in the patient's medical record.      Marty Gloria MD

## 2023-03-31 ENCOUNTER — HOME HEALTH ADMISSION (OUTPATIENT)
Dept: HOME HEALTH SERVICES | Facility: HOME HEALTHCARE | Age: 83
End: 2023-03-31
Payer: MEDICARE

## 2023-03-31 ENCOUNTER — READMISSION MANAGEMENT (OUTPATIENT)
Dept: CALL CENTER | Facility: HOSPITAL | Age: 83
End: 2023-03-31
Payer: MEDICARE

## 2023-03-31 VITALS
RESPIRATION RATE: 18 BRPM | OXYGEN SATURATION: 95 % | HEART RATE: 87 BPM | DIASTOLIC BLOOD PRESSURE: 68 MMHG | HEIGHT: 65 IN | WEIGHT: 119 LBS | TEMPERATURE: 97.8 F | BODY MASS INDEX: 19.83 KG/M2 | SYSTOLIC BLOOD PRESSURE: 166 MMHG

## 2023-03-31 LAB
BASOPHILS # BLD AUTO: 0.05 10*3/MM3 (ref 0–0.2)
BASOPHILS NFR BLD AUTO: 0.5 % (ref 0–1.5)
DEPRECATED RDW RBC AUTO: 59.5 FL (ref 37–54)
EOSINOPHIL # BLD AUTO: 0.04 10*3/MM3 (ref 0–0.4)
EOSINOPHIL NFR BLD AUTO: 0.4 % (ref 0.3–6.2)
ERYTHROCYTE [DISTWIDTH] IN BLOOD BY AUTOMATED COUNT: 17.7 % (ref 12.3–15.4)
HCT VFR BLD AUTO: 21.6 % (ref 34–46.6)
HCT VFR BLD AUTO: 22.3 % (ref 34–46.6)
HGB BLD-MCNC: 7 G/DL (ref 12–15.9)
HGB BLD-MCNC: 7.1 G/DL (ref 12–15.9)
HOLD SPECIMEN: NORMAL
IMM GRANULOCYTES # BLD AUTO: 0.07 10*3/MM3 (ref 0–0.05)
IMM GRANULOCYTES NFR BLD AUTO: 0.7 % (ref 0–0.5)
LYMPHOCYTES # BLD AUTO: 1.56 10*3/MM3 (ref 0.7–3.1)
LYMPHOCYTES NFR BLD AUTO: 15.9 % (ref 19.6–45.3)
MCH RBC QN AUTO: 30.1 PG (ref 26.6–33)
MCHC RBC AUTO-ENTMCNC: 31.8 G/DL (ref 31.5–35.7)
MCV RBC AUTO: 94.5 FL (ref 79–97)
MONOCYTES # BLD AUTO: 0.69 10*3/MM3 (ref 0.1–0.9)
MONOCYTES NFR BLD AUTO: 7 % (ref 5–12)
NEUTROPHILS NFR BLD AUTO: 7.39 10*3/MM3 (ref 1.7–7)
NEUTROPHILS NFR BLD AUTO: 75.5 % (ref 42.7–76)
NRBC BLD AUTO-RTO: 0 /100 WBC (ref 0–0.2)
PLATELET # BLD AUTO: 119 10*3/MM3 (ref 140–450)
PMV BLD AUTO: 10.3 FL (ref 6–12)
RBC # BLD AUTO: 2.36 10*6/MM3 (ref 3.77–5.28)
WBC NRBC COR # BLD: 9.8 10*3/MM3 (ref 3.4–10.8)

## 2023-03-31 PROCEDURE — 97530 THERAPEUTIC ACTIVITIES: CPT

## 2023-03-31 PROCEDURE — 97535 SELF CARE MNGMENT TRAINING: CPT

## 2023-03-31 PROCEDURE — 85025 COMPLETE CBC W/AUTO DIFF WBC: CPT | Performed by: FAMILY MEDICINE

## 2023-03-31 PROCEDURE — 85014 HEMATOCRIT: CPT | Performed by: FAMILY MEDICINE

## 2023-03-31 PROCEDURE — 99024 POSTOP FOLLOW-UP VISIT: CPT | Performed by: ORTHOPAEDIC SURGERY

## 2023-03-31 PROCEDURE — 85018 HEMOGLOBIN: CPT | Performed by: FAMILY MEDICINE

## 2023-03-31 PROCEDURE — 97116 GAIT TRAINING THERAPY: CPT

## 2023-03-31 PROCEDURE — 25010000002 ONDANSETRON PER 1 MG: Performed by: ORTHOPAEDIC SURGERY

## 2023-03-31 RX ORDER — AMLODIPINE BESYLATE 5 MG/1
5 TABLET ORAL
Qty: 30 TABLET | Refills: 0 | Status: SHIPPED | OUTPATIENT
Start: 2023-04-01 | End: 2023-05-01

## 2023-03-31 RX ORDER — HYDROCODONE BITARTRATE AND ACETAMINOPHEN 7.5; 325 MG/1; MG/1
1 TABLET ORAL EVERY 6 HOURS PRN
Qty: 12 TABLET | Refills: 0 | Status: SHIPPED | OUTPATIENT
Start: 2023-03-31 | End: 2023-04-03

## 2023-03-31 RX ORDER — LOSARTAN POTASSIUM 25 MG/1
25 TABLET ORAL
Qty: 30 TABLET | Refills: 0 | Status: SHIPPED | OUTPATIENT
Start: 2023-04-01 | End: 2023-05-01

## 2023-03-31 RX ADMIN — ONDANSETRON 4 MG: 2 INJECTION INTRAMUSCULAR; INTRAVENOUS at 10:07

## 2023-03-31 RX ADMIN — Medication 10 ML: at 09:28

## 2023-03-31 RX ADMIN — Medication 1000 UNITS: at 09:27

## 2023-03-31 RX ADMIN — AMLODIPINE BESYLATE 5 MG: 5 TABLET ORAL at 09:27

## 2023-03-31 RX ADMIN — LOSARTAN POTASSIUM 25 MG: 25 TABLET, FILM COATED ORAL at 09:27

## 2023-03-31 RX ADMIN — ONDANSETRON 4 MG: 2 INJECTION INTRAMUSCULAR; INTRAVENOUS at 03:01

## 2023-03-31 RX ADMIN — CARVEDILOL 12.5 MG: 12.5 TABLET, FILM COATED ORAL at 09:27

## 2023-03-31 RX ADMIN — Medication 1 TABLET: at 09:27

## 2023-03-31 RX ADMIN — HYDROCODONE BITARTRATE AND ACETAMINOPHEN 1 TABLET: 7.5; 325 TABLET ORAL at 15:27

## 2023-03-31 RX ADMIN — DOCUSATE SODIUM 50 MG AND SENNOSIDES 8.6 MG 2 TABLET: 8.6; 5 TABLET, FILM COATED ORAL at 09:26

## 2023-03-31 NOTE — THERAPY TREATMENT NOTE
Acute Care - Physical Therapy Treatment Note  [facility]     Patient Name: Rosaura Salgado  : 1940  MRN: [id number]  Today's Date: 3/31/2023      Visit Dx:     ICD-10-CM ICD-9-CM   1. Closed fracture of neck of right femur, initial encounter ([facility])  S72.001A 820.8   2. Leukopenia, unspecified type  D72.819 288.50   3. Thrombocytopenia ([facility])  D69.6 287.5   4. Hypokalemia  E87.6 276.8   5. Bradycardia  R00.1 427.89   6. Traumatic closed displaced fracture of neck of right femur, initial encounter ([facility])  S72.001A 820.8   7. Carotid stenosis, asymptomatic, bilateral  I65.23 433.10     433.30   8. Primary biliary cirrhosis  K74.3 571.6   9. Essential hypertension  I10 401.9   10. Atherosclerosis of native coronary artery of native heart without angina pectoris  I25.10 414.01   11. 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 Impaired mobility and ADLs  Z74.09 V49.89    Z78.9    13. Impaired functional mobility, balance, gait, and endurance  Z74.09 V49.89     Patient Active Problem List   Diagnosis   • Coronary atherosclerosis of native coronary artery   • Pulmonary embolus ([facility])   • Pneumonia   • Fibromyalgia   • Mixed hyperlipidemia   • Essential hypertension   • Primary biliary cirrhosis   • Carotid stenosis, asymptomatic, bilateral   • Atherosclerosis of artery of extremity with intermittent claudication ([facility])   • Traumatic closed displaced fracture of neck of right femur, initial encounter ([facility])   • Thrombocytopenia ([facility])   • Closed fracture of neck of right femur, initial encounter ([facility])   • Moderate malnutrition ([facility])     Past Medical History:   Diagnosis Date   • Allergic rhinitis    • Coronary atherosclerosis of native coronary artery    • Depression    • Fibromyalgia    • GERD (gastroesophageal reflux disease)    • Hyperlipidemia    • Hypertension    • On anticoagulant therapy    • Peptic ulcer    • Peripheral vascular disease, unspecified ([facility])    • Pneumonia    • Pulmonary embolus  (HCC)      Past Surgical History:   Procedure Laterality Date   • CAROTID ENDARTERECTOMY Left    • COLONOSCOPY N/A 2/1/2019    Procedure: COLONOSCOPY;  Surgeon: Maurice Carmona DO;  Location: [facility] ENDOSCOPY;  Service: Gastroenterology   • ENDOSCOPY N/A 2/1/2019    Procedure: ESOPHAGOGASTRODUODENOSCOPY;  Surgeon: Maurice Carmona DO;  Location: [facility] ENDOSCOPY;  Service: Gastroenterology   • KNEE SURGERY       PT Assessment (last 12 hours)     PT Evaluation and Treatment     Row Name 03/31/23 0945          Physical Therapy Time and Intention    Document Type therapy note (daily note)  -     Mode of Treatment physical therapy;individual therapy  -     Patient Effort good  -     Row Name 03/31/23 0945          General Information    Patient Profile Reviewed yes  -     Existing Precautions/Restrictions fall  -     Row Name 03/31/23 0945          Pain    Pretreatment Pain Rating 0/10 - no pain  -     Posttreatment Pain Rating 0/10 - no pain  -     Row Name 03/31/23 0945          Cognition    Affect/Mental Status (Cognition) WFL  -     Orientation Status (Cognition) oriented x 4  -     Follows Commands (Cognition) WFL  -     Personal Safety Interventions fall prevention program maintained;gait belt;muscle strengthening facilitated;nonskid shoes/slippers when out of bed;supervised activity  -     Row Name 03/31/23 0945          Mobility    Right Lower Extremity (Weight-bearing Status) weight-bearing as tolerated (WBAT)  -     Row Name 03/31/23 0945          Transfers    Transfers stand-sit transfer;sit-stand transfer;chair-bed transfer  -     Row Name 03/31/23 0945          Chair-Bed Transfer    Chair-Bed [place] (Transfers) contact guard  -     Assistive Device (Chair-Bed Transfers) walker, front-wheeled  -     Row Name 03/31/23 0945          Sit-Stand Transfer    Sit-Stand [place] (Transfers) contact guard  -     Assistive Device (Sit-Stand Transfers) walker,  front-wheeled  -JALEN     Row Name 03/31/23 0945          Stand-Sit Transfer    Stand-Sit [place] (Transfers) contact guard  -JALEN     Assistive Device (Stand-Sit Transfers) walker, front-wheeled  -JALEN     Row Name 03/31/23 0945          Gait/Stairs (Locomotion)    Gait/Stairs Locomotion gait/ambulation independence;gait/ambulation assistive device;distance ambulated;gait pattern;gait deviations;maintains weight-bearing status  -JALEN     [place]ee Level (Gait) contact guard  -JALEN     Assistive Device (Gait) walker, front-wheeled  -JALEN     Distance in Feet (Gait) 15, 10, 10  -JALEN     Pattern (Gait) step-to  -JALEN     Deviations/Abnormal Patterns (Gait) antalgic;base of support, narrow;gait speed decreased;stride length decreased  -JALEN     Bilateral Gait Deviations forward flexed posture  -JALEN     Negotiation (Stairs) stairs independence;stairs assistive device;handrail location;number of steps;ascending technique;descending technique  -JALEN     [place]ee Level (Stairs) contact guard;minimum assist (75% patient effort)  -JALEN     Assistive Device (Stairs) walker, front-wheeled  -JALEN     Handrail Location (Stairs) right side (ascending);left side (descending)  -     Number of Steps (Stairs) 4  -JALEN     Ascending Technique (Stairs) step-to-step  -JALEN     Descending Technique (Stairs) step-to-step  -JALEN     Row Name             Wound 03/28/23 1320 Right hip Incision    Wound - Properties Group Placement Date: 03/28/23  -EW Placement Time: 1320  -EW Side: Right  -EW Location: hip  -EW Primary Wound Type: Incision  -EW    Retired Wound - Properties Group Placement Date: 03/28/23  -EW Placement Time: 1320  -EW Side: Right  -EW Location: hip  -EW Primary Wound Type: Incision  -EW    Retired Wound - Properties Group Date first assessed: 03/28/23  -EW Time first assessed: 1320  -EW Side: Right  -EW Location: hip  -EW Primary Wound Type: Incision  -EW    Row Name             Wound 03/28/23 1350 sacral spine Pressure Injury    Wound -  Properties Group Placement Date: 03/28/23 -EW Placement Time: 1350 -EW Location: sacral spine  -EW Primary Wound Type: Pressure inj  -EW    Retired Wound - Properties Group Placement Date: 03/28/23 -EW Placement Time: 1350  -EW Location: sacral spine  -EW Primary Wound Type: Pressure inj  -EW    Retired Wound - Properties Group Date first assessed: 03/28/23 -EW Time first assessed: 1350  -EW Location: sacral spine  -EW Primary Wound Type: Pressure inj  -EW    Row Name             Wound 03/28/23 1351 thoracic spine Pressure Injury    Wound - Properties Group Placement Date: 03/28/23 -EW Placement Time: 1351  -EW Present on Hospital Admission: N  -EW Location: thoracic spine  -EW Primary Wound Type: Pressure inj  -EW    Retired Wound - Properties Group Placement Date: 03/28/23 -EW Placement Time: 1351 -EW Present on Hospital Admission: N  -EW Location: thoracic spine  -EW Primary Wound Type: Pressure inj  -EW    Retired Wound - Properties Group Date first assessed: 03/28/23 -EW Time first assessed: 1351  -EW Present on Hospital Admission: N  -EW Location: thoracic spine  -EW Primary Wound Type: Pressure inj  -EW    Row Name 03/31/23 0945          Plan of Care Review    Plan of Care Reviewed With patient  -JALEN     Progress improving  -JALEN     Outcome Evaluation pt responded well to PT. pt requires CGA for gait and t/fs. pt participated in stair training with CGA-min A while using 1 handrail. pt reports she will have 24/7 care at home and daughter will assist her with stairs. attempted PM tx but pt declines stating she is waiting to DC. pt's walker arrived and was adjusted to appropriate height. pt state she understander her HEP well. no tx this PM. no new goals met. pt would continue to benefit from PT services.  -JALEN     Row Name 03/31/23 0945          Vital Signs    Pre Systolic BP Rehab 170  -JALEN     Pre Treatment Diastolic BP 70  -JALEN     Post Systolic BP Rehab 162  -JALEN     Post Treatment Diastolic BP 66  -JALEN      Pretreatment Heart Rate (beats/min) 89  -JALEN     Posttreatment Heart Rate (beats/min) 88  -JALEN     Pre SpO2 (%) 97  -JALEN     O2 Delivery Pre Treatment room air  -JALEN     Post SpO2 (%) 98  -JALEN     O2 Delivery Post Treatment room air  -JALEN     Pre Patient Position Sitting  -JALEN     Post Patient Position Sitting  -JALEN     Row Name 03/31/23 0945          Positioning and Restraints    Pre-Treatment Position sitting in chair/recliner  -JALEN     Post Treatment Position bed  -JALEN     In Bed fowlers;call light within reach;encouraged to call for assist;exit alarm on  all needs met  -     Row Name 03/31/23 0945          Therapy Assessment/Plan (PT)    Rehab Potential (PT) good, to achieve stated therapy goals  -     Row Name 03/31/23 0945          Bed Mobility Goal 1 (PT)    Activity/Assistive Device (Bed Mobility Goal 1, PT) bed mobility activities, all  -JALEN     [place] Level/Cues Needed (Bed Mobility Goal 1, PT) independent  -JALEN     Time Frame (Bed Mobility Goal 1, PT) by discharge  -JALEN     Progress/Outcomes (Bed Mobility Goal 1, PT) goal not met  -[facility] Name 03/31/23 0945          Transfer Goal 1 (PT)    Activity/Assistive Device (Transfer Goal 1, PT) sit-to-stand/stand-to-sit;bed-to-chair/chair-to-bed  -JALEN     [place] Level/Cues Needed (Transfer Goal 1, PT) modified independence  -JALEN     Time Frame (Transfer Goal 1, PT) by discharge  -JALEN     Progress/Outcome (Transfer Goal 1, PT) goal not met  -[facility] Name 03/31/23 0945          Gait Training Goal 1 (PT)    Activity/Assistive Device (Gait Training Goal 1, PT) gait (walking locomotion);decrease fall risk;increase endurance/gait distance  -JALEN     [place] Level (Gait Training Goal 1, PT) modified independence;standby assist  -JALEN     Distance (Gait Training Goal 1, PT) 150ft or more each trip  -JALEN     Time Frame (Gait Training Goal 1, PT) by discharge  -JALEN     Progress/Outcome (Gait Training Goal 1, PT) goal not met  -     Row Name 03/31/23 0945           ROM Goal 1 (PT)    ROM Goal 1 (PT) Pt will improve LE range so she is able to raise/lower BLE on/off bed for supine to sit to supine without assistance  -     Time Frame (ROM Goal 1, PT) by discharge  -JALEN     Progress/Outcome (ROM Goal 1, PT) goal not met  -     Row Name 03/31/23 0945          Stairs Goal 1 (PT)    Activity/Assistive Device (Stairs Goal 1, PT) ascending stairs;descending stairs;using handrail, left;using handrail, right  -     [place] Level/Cues Needed (Stairs Goal 1, PT) modified independence  -     Number of Stairs (Stairs Goal 1, PT) 5  -JALEN     Time Frame (Stairs Goal 1, PT) 1 week  -JALEN     Progress/Outcome (Stairs Goal 1, PT) goal not met  -           User Key  (r) = Recorded By, (t) = Taken By, (c) = Cosigned By    Initials Name Provider Type     Jackson Pearson PTA Physical Therapist Assistant    Margie Kirby RN Registered Nurse                Physical Therapy Education     Title: PT OT SLP Therapies (In Progress)     Topic: Physical Therapy (In Progress)     Point: Mobility training (In Progress)     Learning Progress Summary           Patient Acceptance, E, NR by  at 3/31/2023 1010    Acceptance, E, NR by  at 3/30/2023 1506    Acceptance, E, NR by  at 3/30/2023 1232    Acceptance, E, NR by  at 3/30/2023 0933    Acceptance, E, NR by [facility] at 3/28/2023 1829                   Point: Home exercise program (Not Started)     Learner Progress:  Not documented in this visit.          Point: Body mechanics (In Progress)     Learning Progress Summary           Patient Acceptance, E, NR by [facility] at 3/28/2023 1829                   Point: Precautions (In Progress)     Learning Progress Summary           Patient Acceptance, E, NR by [facility] at 3/28/2023 1829                               User Key     Initials Effective Dates Name Provider Type Discipline    [facility] 06/16/21 -  Lisseth Browning, PT Physical Therapist PT     06/16/21 -  Jackson Pearson PTA Physical Therapist  Assistant PT              PT Recommendation and Plan  Anticipated Discharge Disposition (PT): inpatient rehabilitation facility, skilled nursing facility  Plan of Care Reviewed With: patient  Progress: improving  Outcome Evaluation: pt responded well to PT. pt requires CGA for gait and t/fs. pt participated in stair training with CGA-min A while using 1 handrail. pt reports she will have 24/7 care at home and daughter will assist her with stairs. attempted PM tx but pt declines stating she is waiting to DC. pt's walker arrived and was adjusted to appropriate height. pt state she understander her HEP well. no tx this PM. no new goals met. pt would continue to benefit from PT services.   Outcome Measures     Row Name 03/31/23 0945 03/30/23 1450 03/30/23 0859       How much help from another person do you currently need...    Turning from your back to your side while in flat bed without using bedrails? 3  -JALEN 3  -JALEN 3  -JALEN    Moving from lying on back to sitting on the side of a flat bed without bedrails? 3  -JALEN 3  -JALEN 3  -JALEN    Moving to and from a bed to a chair (including a wheelchair)? 3  -JALEN 3  -JALEN 3  -JALEN    Standing up from a chair using your arms (e.g., wheelchair, bedside chair)? 3  -JALEN 3  -JALEN 3  -JALEN    Climbing 3-5 steps with a railing? 3  -JALEN 2  -JALEN 2  -JALEN    To walk in hospital room? 3  -JALEN 3  -JALEN 3  -JALEN    AM-PAC 6 Clicks Score (PT) 18  -JALEN 17  -JALEN 17  -JALEN       Functional Assessment    Outcome Measure Options AM-PAC 6 Clicks Basic Mobility (PT)  -JALEN AM-PAC 6 Clicks Basic Mobility (PT)  -JALEN AM-PAC 6 Clicks Basic Mobility (PT)  -JALEN          User Key  (r) = Recorded By, (t) = Taken By, (c) = Cosigned By    Initials Name Provider Type    Jackson Hodgson, PTA Physical Therapist Assistant                 Time Calculation:    PT Charges     Row Name 03/31/23 1010             Time Calculation    Start Time 0945  -      Stop Time 1010  -JALEN      Time Calculation (min) 25 min  -JALEN         Time Calculation- PT     Total Timed Code Minutes- PT 25 minute(s)  -JALEN         Timed Charges    57278 - Gait Training Minutes  10  -JALEN      88511 - PT Therapeutic Activity Minutes 15  -JALEN         Total Minutes    Timed Charges Total Minutes 25  -JALEN       Total Minutes 25  -JALEN            User Key  (r) = Recorded By, (t) = Taken By, (c) = Cosigned By    Initials Name Provider Type    Jackson Hodgson PTA Physical Therapist Assistant              Therapy Charges for Today     Code Description Service Date Service Provider Modifiers Qty    49398305960 HC GAIT TRAINING EA 15 MIN 3/30/2023 Jackson Pearson, PTA GP 1    65615030363 HC PT THERAPEUTIC ACT EA 15 MIN 3/30/2023 Jackson Pearson, PTA GP 2    70088144813 HC PT SELF CARE/MGMT/TRAIN EA 15 MIN 3/30/2023 Jackson Pearson, PTA GP 1    08964847852 HC GAIT TRAINING EA 15 MIN 3/30/2023 Jackson Pearson, PTA GP 1    79655184337 HC PT THER PROC EA 15 MIN 3/30/2023 Jackson Pearson, PTA GP 1    14864849191 HC GAIT TRAINING EA 15 MIN 3/31/2023 Jackson Pearson, PTA GP 1    86509584551 HC PT THERAPEUTIC ACT EA 15 MIN 3/31/2023 Jackson Pearson, PTA GP 1          PT G-Codes  Outcome Measure Options: AM-PAC 6 Clicks Basic Mobility (PT)  AM-PAC 6 Clicks Score (PT): 18  AM-PAC 6 Clicks Score (OT): 19    Jackson eParson PTA  3/31/2023

## 2023-03-31 NOTE — PLAN OF CARE
Goal Outcome Evaluation:           Progress: improving  Outcome Evaluation: VSS. no c/o pain. Meds given per orders. zofran given 2x for emesis. Resting between care.

## 2023-03-31 NOTE — THERAPY TREATMENT NOTE
Patient Name: Rosaura Salgado  : 1940    MRN: [id number]                              Today's Date: 3/31/2023       Admit Date: 3/27/2023    Visit Dx:     ICD-10-CM ICD-9-CM   1. Closed fracture of neck of right femur, initial encounter ([facility])  S72.001A 820.8   2. Leukopenia, unspecified type  D72.819 288.50   3. Thrombocytopenia ([facility])  D69.6 287.5   4. Hypokalemia  E87.6 276.8   5. Bradycardia  R00.1 427.89   6. Traumatic closed displaced fracture of neck of right femur, initial encounter ([facility])  S72.001A 820.8   7. Carotid stenosis, asymptomatic, bilateral  I65.23 433.10     433.30   8. Primary biliary cirrhosis  K74.3 571.6   9. Essential hypertension  I10 401.9   10. Atherosclerosis of native coronary artery of native heart without angina pectoris  I25.10 414.01   11. 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 Impaired mobility and ADLs  Z74.09 V49.89    Z78.9    13. Impaired functional mobility, balance, gait, and endurance  Z74.09 V49.89     Patient Active Problem List   Diagnosis   • Coronary atherosclerosis of native coronary artery   • Pulmonary embolus ([facility])   • Pneumonia   • Fibromyalgia   • Mixed hyperlipidemia   • Essential hypertension   • Primary biliary cirrhosis   • Carotid stenosis, asymptomatic, bilateral   • Atherosclerosis of artery of extremity with intermittent claudication ([facility])   • Traumatic closed displaced fracture of neck of right femur, initial encounter ([facility])   • Thrombocytopenia ([facility])   • Closed fracture of neck of right femur, initial encounter ([facility])   • Moderate malnutrition ([facility])     Past Medical History:   Diagnosis Date   • Allergic rhinitis    • Coronary atherosclerosis of native coronary artery    • Depression    • Fibromyalgia    • GERD (gastroesophageal reflux disease)    • Hyperlipidemia    • Hypertension    • On anticoagulant therapy    • Peptic ulcer    • Peripheral vascular disease, unspecified ([facility])    • Pneumonia    • Pulmonary embolus ([facility])       Past Surgical History:   Procedure Laterality Date   • CAROTID ENDARTERECTOMY Left    • COLONOSCOPY N/A 2/1/2019    Procedure: COLONOSCOPY;  Surgeon: Maurice Carmona DO;  Location: [facility] ENDOSCOPY;  Service: Gastroenterology   • ENDOSCOPY N/A 2/1/2019    Procedure: ESOPHAGOGASTRODUODENOSCOPY;  Surgeon: Maurice Carmona DO;  Location: [facility] ENDOSCOPY;  Service: Gastroenterology   • KNEE SURGERY        General Information     Row Name 03/31/23 0804          OT Time and Intention    Document Type therapy note (daily note)  -     Mode of Treatment individual therapy;occupational therapy  -KD     Row Name 03/31/23 0804          General Information    Patient Profile Reviewed yes  -KD     Existing Precautions/Restrictions fall  -KD     Row Name 03/31/23 0804          Cognition    Orientation Status (Cognition) oriented x 4  -KD     Row Name 03/31/23 0804          Safety Issues, Functional Mobility    Impairments Affecting Function (Mobility) balance;endurance/activity tolerance;range of motion (ROM);strength  -KD           User Key  (r) = Recorded By, (t) = Taken By, (c) = Cosigned By    Initials Name Provider Type     Ayla Fletcher COTA Occupational Therapist Assistant                 Mobility/ADL's     Row Name 03/31/23 0804          Bed Mobility    Comment, (Bed Mobility) Pt up in recliner upon entry.  -KD     Row Name 03/31/23 0804          Sit-Stand Transfer    Sit-Stand [place] (Transfers) contact guard  -KD     Assistive Device (Sit-Stand Transfers) walker, front-wheeled  -KD     Row Name 03/31/23 0804          Stand-Sit Transfer    Stand-Sit [place] (Transfers) contact guard  -KD     Assistive Device (Stand-Sit Transfers) walker, front-wheeled  -KD     Row Name 03/31/23 0804          Functional Mobility    Functional Mobility- Ind. Level contact guard assist  -KD     Functional Mobility- Device walker, front-wheeled  -KD     Functional Mobility-Distance (Feet) 70  x2  -KD     Functional  Mobility- Comment Pt required 1 sitting RB during mobility  -KD     Row Name 03/31/23 0804          Mobility    Extremity Weight-bearing Status right lower extremity  -KD     Right Lower Extremity (Weight-bearing Status) weight-bearing as tolerated (WBAT)  -KD           User Key  (r) = Recorded By, (t) = Taken By, (c) = Cosigned By    Initials Name Provider Type    Ayla Harrell COTA Occupational Therapist Assistant               Obj/Interventions     Row Name 03/31/23 0804          Shoulder (Therapeutic Exercise)    Shoulder Strengthening (Therapeutic Exercise) bilateral;flexion;extension;aBduction;aDduction;horizontal aBduction/aDduction;external rotation;internal rotation  -KD     Row Name 03/31/23 0804          Elbow/Forearm (Therapeutic Exercise)    Elbow/Forearm (Therapeutic Exercise) AROM (active range of motion)  -KD     Elbow/Forearm AROM (Therapeutic Exercise) bilateral;flexion;extension;supination;pronation  -KD     Row Name 03/31/23 0804          Wrist (Therapeutic Exercise)    Wrist (Therapeutic Exercise) AROM (active range of motion)  -KD     Wrist AROM (Therapeutic Exercise) bilateral;flexion;extension  -KD     Row Name 03/31/23 0804          Motor Skills    Therapeutic Exercise shoulder;elbow/forearm;wrist;hand  -KD           User Key  (r) = Recorded By, (t) = Taken By, (c) = Cosigned By    Initials Name Provider Type    Ayla Harrell COTA Occupational Therapist Assistant               Goals/Plan     Row Name 03/31/23 0804          Transfer Goal 1 (OT)    Activity/Assistive Device (Transfer Goal 1, OT) toilet  -KD     [place] Level/Cues Needed (Transfer Goal 1, OT) modified independence  -KD     Time Frame (Transfer Goal 1, OT) long term goal (LTG);by discharge  -KD     Progress/Outcome (Transfer Goal 1, OT) goal not met  -KD     Row Name 03/31/23 0804          Bathing Goal 1 (OT)    Activity/Device (Bathing Goal 1, OT) lower body bathing  -KD     [place] Level/Cues Needed  (Bathing Goal 1, OT) supervision required  -KD     Time Frame (Bathing Goal 1, OT) long term goal (LTG);by discharge  -KD     Progress/Outcomes (Bathing Goal 1, OT) goal not met  -KD     Row Name 03/31/23 0804          Dressing Goal 1 (OT)    Activity/Device (Dressing Goal 1, OT) lower body dressing  -KD     [place]/Cues Needed (Dressing Goal 1, OT) supervision required  -KD     Time Frame (Dressing Goal 1, OT) long term goal (LTG);by discharge  -KD     Progress/Outcome (Dressing Goal 1, OT) goal not met  -KD     Row Name 03/31/23 0804          Toileting Goal 1 (OT)    Activity/Device (Toileting Goal 1, OT) toileting skills, all  -KD     [place] Level/Cues Needed (Toileting Goal 1, OT) supervision required  -KD     Time Frame (Toileting Goal 1, OT) long term goal (LTG);by discharge  -KD     Progress/Outcome (Toileting Goal 1, OT) goal not met  -KD           User Key  (r) = Recorded By, (t) = Taken By, (c) = Cosigned By    Initials Name Provider Type    KD Ayla Fletcher COTA Occupational Therapist Assistant               Clinical Impression     Row Name 03/31/23 0804          Pain Assessment    Pretreatment Pain Rating 0/10 - no pain  -KD     Posttreatment Pain Rating 0/10 - no pain  -KD     Row Name 03/31/23 0804          Plan of Care Review    Plan of Care Reviewed With patient  -KD     Progress improving  -KD     Outcome Evaluation Pt up in recliner upon arrival. Pt completed BUE ther ex in all planes w/ 2lb HW. Pt perf sit>stand-CGA. Pt then amb ~70' CGA of 1 w/ RW and then required seated break 2' fatigue. Pt then amd ~ 70 more' back to room w/ good tolerance. Pt up in recliner @ end of tx w/ all needs in reach. Cont OT POC.  -KD     Row Name 03/31/23 0804          Therapy Assessment/Plan (OT)    Therapy Frequency (OT) daily  -KD     Row Name 03/31/23 0804          Therapy Plan Review/Discharge Plan (OT)    Anticipated Discharge Disposition (OT) inpatient rehabilitation facility;skilled nursing  facility  -KD     Row Name 03/31/23 0804          Vital Signs    Pre Systolic BP Rehab 160  -KD     Pre Treatment Diastolic BP 52  -KD     Pretreatment Heart Rate (beats/min) 85  -KD     Pre SpO2 (%) 94  -KD     O2 Delivery Pre Treatment room air  -KD     Pre Patient Position Sitting  -KD     Intra Patient Position Standing  -KD     Post Patient Position Sitting  -KD     Row Name 03/31/23 0804          Positioning and Restraints    Pre-Treatment Position sitting in chair/recliner  -KD     Post Treatment Position chair  -KD     In Chair sitting;call light within reach;encouraged to call for assist;exit alarm on  -KD           User Key  (r) = Recorded By, (t) = Taken By, (c) = Cosigned By    Initials Name Provider Type    Ayla Harrell COTA Occupational Therapist Assistant               Outcome Measures     Row Name 03/31/23 0804          How much help from another is currently needed...    Putting on and taking off regular lower body clothing? 2  -KD     Bathing (including washing, rinsing, and drying) 2  -KD     Toileting (which includes using toilet bed pan or urinal) 3  -KD     Putting on and taking off regular upper body clothing 4  -KD     Taking care of personal grooming (such as brushing teeth) 4  -KD     Eating meals 4  -KD     AM-PAC 6 Clicks Score (OT) 19  -KD     Row Name 03/31/23 0995          How much help from another person do you currently need...    Turning from your back to your side while in flat bed without using bedrails? 3  -JALEN     Moving from lying on back to sitting on the side of a flat bed without bedrails? 3  -JALEN     Moving to and from a bed to a chair (including a wheelchair)? 3  -JAELN     Standing up from a chair using your arms (e.g., wheelchair, bedside chair)? 3  -JALEN     Climbing 3-5 steps with a railing? 3  -JALEN     To walk in hospital room? 3  -JALEN     AM-PAC 6 Clicks Score (PT) 18  -JALNE     Highest level of mobility 6 --> Walked 10 steps or more  -JALEN     Row Name 03/31/23 0975           Functional Assessment    Outcome Measure Options AM-PAC 6 Clicks Basic Mobility (PT)  -JALEN           User Key  (r) = Recorded By, (t) = Taken By, (c) = Cosigned By    Initials Name Provider Type    JALEN Jackson Pearson PTA Physical Therapist Assistant    Ayla Harrell COTA Occupational Therapist Assistant                Occupational Therapy Education     Title: PT OT SLP Therapies (In Progress)     Topic: Occupational Therapy (In Progress)     Point: ADL training (Done)     Description:   Instruct learner(s) on proper safety adaptation and remediation techniques during self care or transfers.   Instruct in proper use of assistive devices.              Learning Progress Summary           Patient Acceptance, E,TB, VU,NR by  at 3/28/2023 0110    Comment: POC, role of OT, transfer training                   Point: Home exercise program (Not Started)     Description:   Instruct learner(s) on appropriate technique for monitoring, assisting and/or progressing therapeutic exercises/activities.              Learner Progress:  Not documented in this visit.          Point: Precautions (Not Started)     Description:   Instruct learner(s) on prescribed precautions during self-care and functional transfers.              Learner Progress:  Not documented in this visit.          Point: Body mechanics (Not Started)     Description:   Instruct learner(s) on proper positioning and spine alignment during self-care, functional mobility activities and/or exercises.              Learner Progress:  Not documented in this visit.                      User Key     Initials Effective Dates Name Provider Type [facility] 06/14/21 -  Triny Mcdonald OT Occupational Therapist OT              OT Recommendation and Plan  Therapy Frequency (OT): daily  Plan of Care Review  Plan of Care Reviewed With: patient  Progress: improving  Outcome Evaluation: Pt up in recliner upon arrival. Pt completed BUE ther ex in all planes w/ 2lb HW.  Pt perf sit>stand-CGA. Pt then amb ~70' CGA of 1 w/ RW and then required seated break 2' fatigue. Pt then amd ~ 70 more' back to room w/ good tolerance. Pt up in recliner @ end of tx w/ all needs in reach. Cont OT POC.     Time Calculation:    Time Calculation- OT     Row Name 03/31/23 1010 03/31/23 0804          Time Calculation- OT    OT Start Time -- 0804  -KD     OT Stop Time -- 0829  -KD     OT Time Calculation (min) -- 25 min  -KD     Total Timed Code Minutes- OT -- 25 minute(s)  -KD     OT Received On -- 03/31/23  -KD        Timed Charges    01297 - Gait Training Minutes  10  -JALEN --     66318 - OT Self Care/Mgmt Minutes -- 25  -KD        Total Minutes    Timed Charges Total Minutes 10  -JALEN 25  -KD      Total Minutes 10  -JALEN 25  -KD           User Key  (r) = Recorded By, (t) = Taken By, (c) = Cosigned By    Initials Name Provider Type    JALEN Jackson Pearson, PTA Physical Therapist Assistant    KD Ayla Fletcher COTA Occupational Therapist Assistant              Therapy Charges for Today     Code Description Service Date Service Provider Modifiers Qty    [id number] HC OT SELF CARE/MGMT/TRAIN EA 15 MIN 3/31/2023 Ayla Fletcher COTA GO 2               ROSA Johnson  3/31/2023

## 2023-03-31 NOTE — PROGRESS NOTES
Nutrition Services    Patient Name:  Rosaura Salgado  YOB: 1940  MRN: [id number]  Admit Date:  3/27/2023    Follow up. S/p surgery for right hip fracture. Pt found to have sacral and thoracic PI. Cardiac diet. Wt stable since admit, #. Reports appetite is not the greatest and she is not doing very well with eating. Reports getting sick and vomiting after dinner last night. Feels better today. States she is eating the fruit and drinking whole milk. Discussed the importance of protein for wound healing and wt maintenance. Discussed preferences to encourage intake. Reluctant to ask for anything special but agrees to chocolate ice cream. Hopeful to go home today. Will update orders. RDN staff to follow hospital course.     Electronically signed by:  Sophie Prater  03/31/23 10:24 CDT

## 2023-03-31 NOTE — PROGRESS NOTES
[facility]     Progress Note    Patient Name: Rosaura Salgado  : 1940  MRN: [id number]  Primary Care Physician:  Jin Dumont MD  Date of admission: 3/27/2023    Subjective   Subjective     Chief Complaint: POD3 status post right hip bipolar anterior approach    History of Present Illness  Patient Reports episode of emesis last night, but felt much better after with no more episodes since, having bowel movements, reports feeling well, thigh pain improved, eating and sleeping at baseline    Review of Systems   Constitutional: Negative for chills and fever.   Respiratory: Negative for shortness of breath.    Cardiovascular: Negative for chest pain and palpitations.   Gastrointestinal: Positive for nausea and vomiting. Negative for abdominal pain, constipation and diarrhea.       Objective   Objective     Vitals:   Temp:  [98.2 °F (36.8 °C)-99.8 °F (37.7 °C)] 98.8 °F (37.1 °C)  Heart Rate:  [74-87] 85  Resp:  [18] 18  BP: (137-198)/(52-74) 160/52    Physical Exam  Constitutional:       General: She is not in acute distress.     Appearance: Normal appearance. She is not ill-appearing.   Cardiovascular:      Rate and Rhythm: Normal rate and regular rhythm.   Pulmonary:      Effort: Pulmonary effort is normal. No respiratory distress.      Breath sounds: Normal breath sounds.   Abdominal:      General: Abdomen is flat.      Palpations: Abdomen is soft. There is no mass.      Tenderness: There is no abdominal tenderness.   Musculoskeletal:      Right lower leg: No tenderness. No edema.      Left lower leg: No tenderness. No edema.        Legs:    Neurological:      Mental Status: She is alert.          Result Review    Result Review:  I have personally reviewed the results from the time of this admission to 3/31/2023 06:02 CDT and agree with these findings:  [x]  Laboratory list / accordion   []  Microbiology  []  Radiology  []  EKG/Telemetry   []  Cardiology/Vascular   []  Pathology  []  Old  records  []  Other:  Most notable findings include:   Results from last 7 days   Lab Units 03/30/23  0636 03/29/23  0747 03/28/23  0548   WBC 10*3/mm3 9.24 8.25 10.54   HEMOGLOBIN g/dL 9.6* 10.7* 13.0   HEMATOCRIT % 28.1* 32.0* 39.4   PLATELETS 10*3/mm3 95* 93* 85*     Results from last 7 days   Lab Units 03/29/23  0747 03/28/23  0548 03/27/23  2113 03/27/23  0919   SODIUM mmol/L 136 135*  --  139   POTASSIUM mmol/L 3.7 4.1 3.4* 2.8*   CHLORIDE mmol/L 108* 107  --  107   CO2 mmol/L 18.0* 15.0*  --  23.0   BUN mg/dL 23 12  --  11   CREATININE mg/dL 1.28* 0.98  --  1.10*   CALCIUM mg/dL 8.1* 8.4*  --  8.0*   BILIRUBIN mg/dL 1.4* 2.3*  --  1.0   ALK PHOS U/L 311* 395*  --  385*   ALT (SGPT) U/L 18 30  --  30   AST (SGOT) U/L 36* 47*  --  46*   GLUCOSE mg/dL 184* 126*  --  126*     Results from last 7 days   Lab Units 03/28/23  0110   MAGNESIUM mg/dL 1.4*       - Hgb down to 9.6 from 10.7  - Magnesium 1.4  - Platelets have improved to 95  - appropriate UOP and reports good Bowel movements  - Blood pressures have been high 200/80s but seem to be down more near 180/60 today    Assessment & Plan   Assessment / Plan     Brief Patient Summary:  Rosaura Salgado is a 82 y.o. female who is status post bipolar hip replacement of right hip and overall appears to be doing well.    Active Hospital Problems:  Active Hospital Problems    Diagnosis    • **Traumatic closed displaced fracture of neck of right femur, initial encounter ([facility])    • Moderate malnutrition ([facility])    • Closed fracture of neck of right femur, initial encounter ([facility])    • Thrombocytopenia ([facility])    • Carotid stenosis, asymptomatic, bilateral    • Essential hypertension    • Primary biliary cirrhosis    • Coronary atherosclerosis of native coronary artery      Plan:   1. Encourage ambulation and movement as tolerated  2. Continue DVT prophylaxis  3. Continue wound care and pain control measures    Deandre Long, Medical Student    As above.  Thigh pain is  resolved.  nontender on exam  Calves are soft and nontender.  Toes up and down.  Anticipate discharge home today.  WBAT  Slowly progress as tolerated  DVT prophylaxis for 30 days.  F/u in ortho in 2 weeks with repeat xrays.    Electronically signed by Juan Carlos Jesus MD on 03/31/23 at 08:09 CDT

## 2023-03-31 NOTE — PLAN OF CARE
Goal Outcome Evaluation:  Plan of Care Reviewed With: patient     Problem: Adult Inpatient Plan of Care  Goal: Plan of Care Review  Recent Flowsheet Documentation  Taken 3/31/2023 2783 by Jackson Pearson PTA  Progress: improving  Plan of Care Reviewed With: patient  Outcome Evaluation: pt responded well to PT. pt requires CGA for gait and t/fs. pt participated in stair training with CGA-min A while using 1 handrail. pt reports she will have 24/7 care at home and daughter will assist her with stairs. attempted PM tx but pt declines stating she is waiting to DC. pt's walker arrived and was adjusted to appropriate height. pt state she understands her HEP well. no tx this PM. no new goals met. pt would continue to benefit from PT services.

## 2023-03-31 NOTE — DISCHARGE PLACEMENT REQUEST
"Please deliver to room 424    Sophy Salgado (82 y.o. Female)     Date of Birth   1940    Social Security Number       Address   [address] [address]    Home Phone   [phone]    MRN   [id number]       Religious   None    Marital Status   Single                            Admission Date   3/27/23    Admission Type   Emergency    Admitting Provider   Marty Gloria MD    Attending Provider   Marty Gloria MD    Department, Room/Bed   [address], [facility]/       Discharge Date       Discharge Disposition   Home-Health Care [facility]    Discharge Destination                               Attending Provider: Marty Gloria MD    Allergies: No Known Allergies    Isolation: None   Infection: None   Code Status: CPR    Ht: 165.1 cm (65\")   Wt: 54 kg (119 lb)    Admission Cmt: None   Principal Problem: Traumatic closed displaced fracture of neck of right femur, initial encounter ([facility]) [S72.001A]                 Active Insurance as of 3/27/2023     Primary Coverage     Payor Plan Insurance Group Employer/Plan Group    HUMANA MEDICARE REPLACEMENT HUMANA MEDICARE REPLACEMENT [id number]     Payor Plan Address Payor Plan Phone Number Payor Plan Fax Number Effective Dates    [address] [address] [phone]  2018 - None Entered    [facility] [address]       Subscriber Name Subscriber Birth Date Member ID       SOPHY SALGADO 1940 [id number]                 Emergency Contacts      (Rel.) Home Phone Work Phone Mobile Phone    Radhika Mace (Daughter) [phone] -- [phone]           [address] [address]  Dept. Phone:  [phone]  Dept. Fax:   Date Ordered: Mar 31, 2023         Patient:  Sophy Salgado MRN:  [id number]   [address] [id number] :  1940  SSN:    Phone: [phone] Sex:  F     Weight: 54 kg (119 lb)       " "  Ht Readings from Last 1 Encounters:   03/27/23 165.1 cm (65\")         Walker               (Order ID: [id number])    Diagnosis:  Closed fracture of neck of right femur, initial encounter ([facility]) (S72.001A [ICD-10-CM] 820.8 [ICD-9-CM])  Impaired mobility and ADLs (Z74.09,Z78.9 [ICD-10-CM] V49.89 [ICD-9-CM])  Impaired functional mobility, balance, gait, and endurance (Z74.09 [ICD-10-CM] V49.89 [ICD-9-CM])   Quantity:  1     Equipment:  Walker Folding with Wheels  Length of Need (99 Months = Lifetime): 99 Months = Lifetime        Authorizing Provider's Phone: [phone]  Authorizing Provider:Marty Gloria MD  Authorizing Provider's NPI: [id number]  Order Entered By: Marty Gloria MD 3/31/2023  1:57 PM     Electronically signed by: Marty Gloria MD 3/31/2023  1:57 PM         Emergency Contact Information     Name Relation Home Work Mobile    Radhika Mace [phone]          Insurance Information                HUMANA MEDICARE REPLACEMENT/HUMANA MEDICARE REPLACEMENT Phone: [phone]    Subscriber: Rosaura Salgado Subscriber#: [id number]    Group#: [id number] Precert#: --          "

## 2023-03-31 NOTE — DISCHARGE PLACEMENT REQUEST
"Sophy Salgado (82 y.o. Female)     Date of Birth   1940    Social Security Number       Address   [address] [address]    Home Phone   [phone]    MRN   [id number]       Muslim   None    Marital Status   Single                            Admission Date   3/27/23    Admission Type   Emergency    Admitting Provider   Marty Gloria MD    Attending Provider   Marty Gloria MD    Department, Room/Bed   [address], [facility]/1       Discharge Date       Discharge Disposition   Home-Health Care [facility]    Discharge Destination                               Attending Provider: Marty Gloria MD    Allergies: No Known Allergies    Isolation: None   Infection: None   Code Status: CPR    Ht: 165.1 cm (65\")   Wt: 54 kg (119 lb)    Admission Cmt: None   Principal Problem: Traumatic closed displaced fracture of neck of right femur, initial encounter ([facility]) [S72.001A]                 Active Insurance as of 3/27/2023     Primary Coverage     Payor Plan Insurance Group Employer/Plan Group    HUMANA MEDICARE REPLACEMENT HUMANA MEDICARE REPLACEMENT [id number]     Payor Plan Address Payor Plan Phone Number Payor Plan Fax Number Effective Dates    [address] [address] [phone]  2018 - None Entered    [facility] [address]       Subscriber Name Subscriber Birth Date Member ID       SOPHY SALGADO 1940 [id number]                 Emergency Contacts      (Rel.) Home Phone Work Phone Mobile Phone    Radhika Mace (Daughter) [phone] -- [phone]           [address] [address]  Phone:  [phone]  Fax:   Date: Mar 31, 2023      Ambulatory Referral to Home Health     Patient:  Sophy Salgado MRN:  [id number]   [address] [id number] :  1940  SSN:    Phone: [phone] Sex:  F      INSURANCE PAYOR PLAN GROUP # SUBSCRIBER ID "   Primary:    HUMANA MEDICARE REPLACEMENT [id number]      Referring Provider Information:  JONADANYA STONER A Phone: [phone] Fax: [phone]       Referral Information:   # Visits:  999 Referral Type: Home Health [42]   Urgency:  Routine Referral Reason: Specialty Services Required   Start Date: Mar 31, 2023 End Date:  To be determined by Insurer   Diagnosis: Closed fracture of neck of right femur, initial encounter ([facility]) (S72.001A [ICD-10-CM] 820.8 [ICD-9-CM])  Impaired mobility and ADLs (Z74.09,Z78.9 [ICD-10-CM] V49.89 [ICD-9-CM])  Impaired functional mobility, balance, gait, and endurance (Z74.09 [ICD-10-CM] V49.89 [ICD-9-CM])      Refer to Dept:   Refer to Provider:   Refer to Provider Phone:   Refer to Facility:       Face to Face Visit Date: 3/31/2023  Follow-up provider for Plan of Care? I treated the patient in an acute care facility and will not continue treatment after discharge.  Follow-up provider: NICOLE HOWARD [[id number]]  Reason/Clinical Findings: right hip fracture  Describe mobility limitations that make leaving home difficult: right hip fracture  Nursing/Therapeutic Services Requested: Skilled Nursing  Nursing/Therapeutic Services Requested: Physical Therapy  Nursing/Therapeutic Services Requested: Occupational Therapy  Skilled nursing orders: Cardiopulmonary assessments  PT orders: Therapeutic exercise  PT orders: Gait Training  PT orders: Transfer training  PT orders: Strengthening  PT orders: Home safety assessment  Weight Bearing Status: As Tolerated  Occupational orders: Activities of daily living  Occupational orders: Energy conservation  Occupational orders: Strengthening  Occupational orders: Home safety assessment  Frequency: 1 Week 1     This document serves as a request of services and does not constitute Insurance authorization or approval of services.  To determine eligibility, please contact the members Insurance carrier to verify and review coverage.     If  you have medical questions regarding this request for services. Please contact [address] at [phone] during normal business hours.        Authorizing Provider:Marty Gloria MD  Authorizing Provider's NPI: [id number]  Order Entered By: Marty Gloria MD 3/31/2023  1:57 PM     Electronically signed by: Marty Gloria MD 3/31/2023  1:57 PM         Emergency Contact Information     Name Relation Home Work Mobile    Radhika Mace [phone]          Insurance Information                HUMANA MEDICARE REPLACEMENT/HUMANA MEDICARE REPLACEMENT Phone: [phone]    Subscriber: Rosaura Salgado Subscriber#: [id number]    Group#: [id number] Precert#: --

## 2023-03-31 NOTE — DISCHARGE PLACEMENT REQUEST
OR     COLONOSCOPY WITH CO2 INSUFFLATION Hernan Seth  "Rosaura Viramontes (82 y.o. Female)     Date of Birth   1940    Social Security Number       Address   [address] [address]    Home Phone   [phone]    MRN   [id number]       Adventism   None    Marital Status   Single                            Admission Date   3/27/23    Admission Type   Emergency    Admitting Provider   Marty Gloria MD    Attending Provider   Marty Gloria MD    Department, Room/Bed   [address], 424/1       Discharge Date       Discharge Disposition   Home-Health Care [facility]    Discharge Destination                               Attending Provider: Marty Gloria MD    Allergies: No Known Allergies    Isolation: None   Infection: None   Code Status: CPR    Ht: 165.1 cm (65\")   Wt: 54 kg (119 lb)    Admission Cmt: None   Principal Problem: Traumatic closed displaced fracture of neck of right femur, initial encounter ([facility]) [S72.001A]                 Active Insurance as of 3/27/2023     Primary Coverage     Payor Plan Insurance Group Employer/Plan Group    HUMANA MEDICARE REPLACEMENT HUMANA MEDICARE REPLACEMENT [id number]     Payor Plan Address Payor Plan Phone Number Payor Plan Fax Number Effective Dates    [address] [address] [phone]  6/1/2018 - None Entered    [facility] [address]       Subscriber Name Subscriber Birth Date Member ID       ROSAURA VIRAMONTES 1940 [id number]                 Emergency Contacts      (Rel.) Home Phone Work Phone Mobile Phone    Radhika Mace (Daughter) [phone] -- [phone]           Ayla Fletcher COTA   Occupational Therapist Assistant  Occupational Therapy  Plan of Care      Signed  Date of Service:  03/31/23 1354  Creation Time:  03/31/23 1354     Signed          Goal Outcome Evaluation:  Plan of Care Reviewed With: patient  Progress: improving  Outcome Evaluation: Pt up in recliner upon arrival. Pt completed BUE ther ex in all planes w/ 2lb HW. Pt perf " MD JOHNY;  Location: [facility] OR     EXTRACTION(S) DENTAL      wisdom teeth        Hx of Blood transfusions/reactions: none     Hx of abnormal bleeding or anti-platelet use: ASA 81 mg - hold 7 days    Menstrual history: No LMP for male patient.:     Steroid use in the last year: none    Personal or FH with difficulty with Anesthesia:  denies    Prior to Admission Medications  Current Outpatient Medications   Medication Sig Dispense Refill     albuterol (PROAIR HFA/PROVENTIL HFA/VENTOLIN HFA) 108 (90 Base) MCG/ACT inhaler Inhale 2 puffs into the lungs every 6 hours as needed for shortness of breath / dyspnea Profile Rx: patient will contact pharmacy when needed 1 Inhaler 1     amLODIPine (NORVASC) 10 MG tablet Take 1 tablet (10 mg) by mouth daily (Patient taking differently: Take 10 mg by mouth every morning ) 90 tablet 3     aspirin (ASA) 81 MG chewable tablet Take 1 tablet (81 mg) by mouth daily (Patient taking differently: Take 81 mg by mouth every morning ) 108 tablet 2     B Complex Vitamins (VITAMIN B COMPLEX PO) Take 1 tablet by mouth every morning        Digestive Enzymes (MULTI-ENZYME) TABS Take 2 tablets by mouth 3 times daily (with meals)        FIBER FORMULA CAPS Take 6 tablets by mouth 3 times daily        fluticasone (FLONASE) 50 MCG/ACT nasal spray SHAKE LIQUID AND USE 1 TO 2 SPRAYS IN EACH NOSTRIL DAILY (Patient taking differently: every evening SHAKE LIQUID AND USE 1 TO 2 SPRAYS IN EACH NOSTRIL DAILY) 48 mL 3     hydrochlorothiazide (HYDRODIURIL) 25 MG tablet TAKE 1 TABLET(25 MG) BY MOUTH DAILY (Patient taking differently: Take 25 mg by mouth every morning TAKE 1 TABLET(25 MG) BY MOUTH DAILY) 90 tablet 1     lisinopril (PRINIVIL/ZESTRIL) 40 MG tablet TAKE 1 TABLET(40 MG) BY MOUTH DAILY (Patient taking differently: Take 40 mg by mouth every morning TAKE 1 TABLET(40 MG) BY MOUTH DAILY) 90 tablet 1     Zinc 13.3 MG LOZG Take by mouth as needed       Ascorbic Acid (VITAMIN C CR) 500 MG TBCR Take 1 tablet by mouth  sit>stand-CGA. Pt then amb ~70' CGA of 1 w/ RW and then required seated break 2' fatigue. Pt then amd ~ 70 more' back to room w/ good tolerance. Pt up in recliner @ end of tx w/ all needs in reach. Cont OT POC.                  Emergency Contact Information     Name Relation Home Work Mobile    Radhika Mace [phone]          Insurance Information                HUMANA MEDICARE REPLACEMENT/HUMANA MEDICARE REPLACEMENT Phone: [phone]    Subscriber: Rosaura Salgado Subscriber#: [id number]    Group#: [id number] Precert#: --           [phone]  Fax: [phone]

## 2023-03-31 NOTE — PLAN OF CARE
Goal Outcome Evaluation:  Plan of Care Reviewed With: patient        Progress: improving  Outcome Evaluation: Pt up in recliner upon arrival. Pt completed BUE ther ex in all planes w/ 2lb HW. Pt perf sit>stand-CGA. Pt then amb ~70' CGA of 1 w/ RW and then required seated break 2' fatigue. Pt then amd ~ 70 more' back to room w/ good tolerance. Pt up in recliner @ end of tx w/ all needs in reach. Cont OT POC.

## 2023-04-02 ENCOUNTER — HOME CARE VISIT (OUTPATIENT)
Dept: HOME HEALTH SERVICES | Facility: HOME HEALTHCARE | Age: 83
End: 2023-04-02

## 2023-04-03 NOTE — PAYOR COMM NOTE
"Ginny Feliciano  [facility]  Case Management Extender  [phone] phone  [phone] fax      Auth# [id number]    Rosaura Viramontes (82 y.o. Female)     Date of Birth   1940    Social Security Number       Address   [address] [address]    Home Phone   [phone]    MRN   [id number]       Baptism   None    Marital Status   Single                            Admission Date   3/27/23    Admission Type   Emergency    Admitting Provider   Marty Gloria MD    Attending Provider       Department, Room/Bed   [facility] [address] [place], 424/1       Discharge Date   3/31/2023    [place] [facility] [place]                               Attending Provider: (none)   Allergies: No Known Allergies    Isolation: None   Infection: None   Code Status: Prior    Ht: 165.1 cm (65\")   Wt: 54 kg (119 lb)    Admission Cmt: None   Principal Problem: Traumatic closed displaced fracture of neck of right femur, initial encounter [S72.001A]                 Active Insurance as of 3/27/2023     Primary Coverage     Payor Plan Insurance Group Employer/Plan Group    HUMANA MEDICARE REPLACEMENT HUMANA MEDICARE REPLACEMENT [id number]     Payor Plan Address Payor Plan Phone Number Payor Plan Fax Number Effective Dates    [address] [address] [phone]  6/1/2018 - None Entered    [facility] [id number]       Subscriber Name Subscriber Birth Date Member ID       ROSAURA VIRAMONTES 1940 [id number]                 Emergency Contacts      (Rel.) Home Phone Work Phone Mobile Phone    Radhika Mace (Daughter) [phone] -- [phone]               Discharge Summary      Marty Gloria MD at 03/31/23 1357              [facility] Medicine Services  DISCHARGE SUMMARY       Date of Admission: 3/27/2023  Date of Discharge:  3/31/2023  Primary Care Physician: Tim" Jin Golden MD    Presenting Problem/History of Present Illness:  Hypokalemia [E87.6]  Bradycardia [R00.1]  Thrombocytopenia (HCC) [D69.6]  Closed fracture of neck of right femur, initial encounter ([facility]) [S72.001A]  Leukopenia, unspecified type [D72.819]     Final Discharge Diagnoses:  Active Hospital Problems    Diagnosis    • **Traumatic closed displaced fracture of neck of right femur, initial encounter ([facility])    • Moderate malnutrition (HCC)    • Closed fracture of neck of right femur, initial encounter ([facility])    • Thrombocytopenia (HCC)    • Carotid stenosis, asymptomatic, bilateral    • Essential hypertension    • Primary biliary cirrhosis    • Coronary atherosclerosis of native coronary artery        Consults:   Consults     Date and Time Order Name Status Description    3/27/2023 11:17 AM Orthopedics (on-call MD unless specified) Completed           Procedures Performed: Procedure(s):  RIGHT HIP BIPOLAR ANTERIOR APPROACH                Pertinent Test Results:   Lab Results (most recent)     Procedure Component Value Units Date/Time    Hemoglobin & Hematocrit, Blood [[id number]]  (Abnormal) Collected: 03/31/23 1107    Specimen: Blood Updated: 03/31/23 1132     Hemoglobin 7.0 g/dL      Hematocrit 21.6 %     Extra Tubes [[id number]] Collected: 03/31/23 0845    Specimen: Blood, Venous Line Updated: 03/31/23 1001    Narrative:      The following orders were created for panel order Extra Tubes.  Procedure                               Abnormality         Status                     ---------                               -----------         ------                     Green Top (Gel)[[id number]]                                  Final result                 Please view results for these tests on the individual orders.    Green Top (Gel) [[id number]] Collected: 03/31/23 0845    Specimen: Blood Updated: 03/31/23 1001     Extra Tube Hold for add-ons.     Comment: Auto resulted.       CBC & Differential [[id number]]   (Abnormal) Collected: 23    Specimen: Blood Updated: 23    Narrative:      The following orders were created for panel order CBC & Differential.  Procedure                               Abnormality         Status                     ---------                               -----------         ------                     CBC Auto Differential[[id number]]        Abnormal            Final result                 Please view results for these tests on the individual orders.    CBC Auto Differential [[id number]]  (Abnormal) Collected: 23    Specimen: Blood Updated: 23     WBC 9.80 10*3/mm3      RBC 2.36 10*6/mm3      Hemoglobin 7.1 g/dL      Hematocrit 22.3 %      MCV 94.5 fL      MCH 30.1 pg      MCHC 31.8 g/dL      RDW 17.7 %      RDW-SD 59.5 fl      MPV 10.3 fL      Platelets 119 10*3/mm3      Neutrophil % 75.5 %      Lymphocyte % 15.9 %      Monocyte % 7.0 %      Eosinophil % 0.4 %      Basophil % 0.5 %      Immature Grans % 0.7 %      Neutrophils, Absolute 7.39 10*3/mm3      Lymphocytes, Absolute 1.56 10*3/mm3      Monocytes, Absolute 0.69 10*3/mm3      Eosinophils, Absolute 0.04 10*3/mm3      Basophils, Absolute 0.05 10*3/mm3      Immature Grans, Absolute 0.07 10*3/mm3      nRBC 0.0 /100 WBC     TISSUE EXAM, [facility] (P[facility]ELA[facility]) [[id number]] Collected: 23 1145    Specimen: Bone from Hip, Right Updated: 23 1314     Reference Lab Report --     [facility]  [address] [place] [address]  Phone: [phone] or [phone]  Fax: [phone]  Jareth Rain M.D., Medical Director    PATIENT NAME                           LABORATORY NO.  SOPHY RODRIGUEZ.                 [id number]  [id number]                         AGE              SEX  SSN           CLIENT REF #  [facility]           82      1940  F    [id number]   [id number]    [place]                       ALFREDOING MAXIMUS YUNG  "M.D.     COPY TO.  [address] [facility]                 [address] [place] [address]                                                    PHYLICIA HART DON  DATE COLLECTED      DATE RECEIVED      DATE REPORTED  03/28/2023 03/28/2023 03/30/2023    DIAGNOSIS:  FEMORAL HEAD, FRACTURE, RIGHT:  Fracture site changes  Osteoarthritis  Negative for malignancy    RLL    CLINICAL HISTORY:  Closed fracture of neck of right femur, initial encounter, thrombocytopenia,  carotid stenosis, asymptomatic, bilateral, primary biliary cholangitis, essential  hypertension, atherosclerosis of native coronary artery of native heart without  angina pectoris    SPECIMENS RECEIVED:  FEMORAL HEAD, FRACTURE, RIGHT    MICROSCOPIC DESCRIPTION:  Tissue blocks are prepared and slides are examined microscopically on all  specimens. See diagnosis for details.    Professional interpretation rendered by Jareth Rain M.D., RAMON.[facility] at  [facility], [address] [place] [address].    GROSS DESCRIPTION:  Specimen received in formalin labeled \"right hip femoral head\" and consists of a  4.5 cm in diameter by 3.6 cm in length portion of femoral head having a  roughened hemorrhagic proximal end.  Also received are separate portions of  bone and soft tissue (3.0 x 3.0 x 1.3 cm in aggregate).  Specimen is a total  combined weight of 52 g.  The articular surface is tan and glistening with focal  areas of roughened granularity.  The femoral head is bisected revealing a  hemorrhagic cut surface.  No areas of nodularity or other discrete mass lesions  are grossly identified.  Representative sections are submitted in 2 cassettes after  decalcification.  JTM/RLL    REVIEWED, DIAGNOSED AND ELECTRONICALLY  SIGNED BY:    Jareth Rain M.D., F.C.A.P.  CPT CODES:  32946, 93207      CBC & Differential [[id number]]  (Abnormal) Collected: 03/30/23 0636    Specimen: Blood Updated: 03/30/23 0730    " Narrative:      The following orders were created for panel order CBC & Differential.  Procedure                               Abnormality         Status                     ---------                               -----------         ------                     CBC Auto Differential[[id number]]        Abnormal            Final result                 Please view results for these tests on the individual orders.    CBC Auto Differential [[id number]]  (Abnormal) Collected: 03/30/23 0636    Specimen: Blood Updated: 03/30/23 0730     WBC 9.24 10*3/mm3      RBC 3.15 10*6/mm3      Hemoglobin 9.6 g/dL      Hematocrit 28.1 %      MCV 89.2 fL      MCH 30.5 pg      MCHC 34.2 g/dL      RDW 17.2 %      RDW-SD 54.9 fl      MPV 10.2 fL      Platelets 95 10*3/mm3      Neutrophil % 78.1 %      Lymphocyte % 13.3 %      Monocyte % 7.1 %      Eosinophil % 0.2 %      Basophil % 0.3 %      Immature Grans % 1.0 %      Neutrophils, Absolute 7.21 10*3/mm3      Lymphocytes, Absolute 1.23 10*3/mm3      Monocytes, Absolute 0.66 10*3/mm3      Eosinophils, Absolute 0.02 10*3/mm3      Basophils, Absolute 0.03 10*3/mm3      Immature Grans, Absolute 0.09 10*3/mm3      nRBC 0.0 /100 WBC     Comprehensive Metabolic Panel [[id number]]  (Abnormal) Collected: 03/29/23 0747    Specimen: Blood Updated: 03/29/23 0839     Glucose 184 mg/dL      BUN 23 mg/dL      Creatinine 1.28 mg/dL      Sodium 136 mmol/L      Potassium 3.7 mmol/L      Chloride 108 mmol/L      CO2 18.0 mmol/L      Calcium 8.1 mg/dL      Total Protein 6.4 g/dL      Albumin 2.9 g/dL      ALT (SGPT) 18 U/L      AST (SGOT) 36 U/L      Alkaline Phosphatase 311 U/L      Total Bilirubin 1.4 mg/dL      Globulin 3.5 gm/dL      A/G Ratio 0.8 g/dL      BUN/Creatinine Ratio 18.0     Anion Gap 10.0 mmol/L      eGFR 42.2 mL/min/1.73     Narrative:      GFR Normal >60  Chronic Kidney Disease <60  Kidney Failure <15    The GFR formula is only valid for adults with stable renal function between ages 18 and 70.     Comprehensive Metabolic Panel [[id number]]  (Abnormal) Collected: 03/28/23 0548    Specimen: Blood Updated: 03/28/23 0647     Glucose 126 mg/dL      BUN 12 mg/dL      Creatinine 0.98 mg/dL      Sodium 135 mmol/L      Potassium 4.1 mmol/L      Comment: Slight hemolysis detected by analyzer. Results may be affected.        Chloride 107 mmol/L      CO2 15.0 mmol/L      Calcium 8.4 mg/dL      Total Protein 6.8 g/dL      Albumin 2.9 g/dL      ALT (SGPT) 30 U/L      AST (SGOT) 47 U/L      Comment: Slight hemolysis detected by analyzer. Results may be affected.        Alkaline Phosphatase 395 U/L      Total Bilirubin 2.3 mg/dL      Globulin 3.9 gm/dL      A/G Ratio 0.7 g/dL      BUN/Creatinine Ratio 12.2     Anion Gap 13.0 mmol/L      eGFR 58.1 mL/min/1.73     Narrative:      GFR Normal >60  Chronic Kidney Disease <60  Kidney Failure <15    The GFR formula is only valid for adults with stable renal function between ages 18 and 70.    Magnesium [[id number]]  (Abnormal) Collected: 03/28/23 0110    Specimen: Blood Updated: 03/28/23 0129     Magnesium 1.4 mg/dL     Potassium [[id number]]  (Abnormal) Collected: 03/27/23 2113    Specimen: Blood Updated: 03/27/23 2144     Potassium 3.4 mmol/L     Extra Tubes [[id number]] Collected: 03/27/23 1137    Specimen: Blood, Venous Line Updated: 03/27/23 1245    Narrative:      The following orders were created for panel order Extra Tubes.  Procedure                               Abnormality         Status                     ---------                               -----------         ------                     Gold Top - SST[[id number]]                                   Final result               Light Blue Top[[id number]]                                   Final result                 Please view results for these tests on the individual orders.    Gold Top - SST [[id number]] Collected: 03/27/23 1137    Specimen: Blood Updated: 03/27/23 1245     Extra Tube Hold for add-ons.     Comment: Auto  resulted.       Light Blue Top [[id number]] Collected: 03/27/23 1137    Specimen: Blood Updated: 03/27/23 1245     Extra Tube Hold for add-ons.     Comment: Auto resulted       Herreid Draw [[id number]] Collected: 03/27/23 0919    Specimen: Blood Updated: 03/27/23 1230    Narrative:      The following orders were created for panel order Herreid Draw.  Procedure                               Abnormality         Status                     ---------                               -----------         ------                     Green Top (Gel)[[id number]]                                  Final result               Lavender Top[[id number]]                                     Final result               Gold Top - SST[[id number]]                                   Final result               Light Blue Top[[id number]]                                   Final result                 Please view results for these tests on the individual orders.    Green Top (Gel) [[id number]] Collected: 03/27/23 1125    Specimen: Blood Updated: 03/27/23 1230     Extra Tube Hold for add-ons.     Comment: Auto resulted.       Ammonia [[id number]]  (Normal) Collected: 03/27/23 1125    Specimen: Blood Updated: 03/27/23 1206     Ammonia 21 umol/L     Single High Sensitivity Troponin T [[id number]]  (Abnormal) Collected: 03/27/23 1125    Specimen: Blood Updated: 03/27/23 1203     HS Troponin T 23 ng/L     Narrative:      High Sensitive Troponin T Reference Range:  <10.0 ng/L- Negative Female for AMI  <15.0 ng/L- Negative Male for AMI  >=10 - Abnormal Female indicating possible myocardial injury.  >=15 - Abnormal Male indicating possible myocardial injury.   Clinicians would have to utilize clinical acumen, EKG, Troponin, and serial changes to determine if it is an Acute Myocardial Infarction or myocardial injury due to an underlying chronic condition.         Magnesium [[id number]]  (Normal) Collected: 03/27/23 0919    Specimen: Blood Updated: 03/27/23 1034      Magnesium 1.7 mg/dL     Lavender Top [[id number]] Collected: 03/27/23 0919    Specimen: Blood Updated: 03/27/23 1030     Extra Tube hold for add-on     Comment: Auto resulted       Gold Top - SST [[id number]] Collected: 03/27/23 0919    Specimen: Blood Updated: 03/27/23 1030     Extra Tube Hold for add-ons.     Comment: Auto resulted.       Light Blue Top [[id number]] Collected: 03/27/23 0919    Specimen: Blood Updated: 03/27/23 1030     Extra Tube Hold for add-ons.     Comment: Auto resulted       aPTT [[id number]]  (Normal) Collected: 03/27/23 0919    Specimen: Blood Updated: 03/27/23 1028     PTT 32.3 seconds     Narrative:      The recommended Heparin therapeutic range is 68-97 seconds.    Protime-INR [[id number]]  (Abnormal) Collected: 03/27/23 0919    Specimen: Blood Updated: 03/27/23 1028     Protime 15.4 Seconds      INR 1.23    Narrative:      Therapeutic range for most indications is 2.0-3.0 INR,  or 2.5-3.5 for mechanical heart valves.    Scan Slide [[id number]] Collected: 03/27/23 0919    Specimen: Blood Updated: 03/27/23 1026     Anisocytosis Slight/1+     Hypochromia Slight/1+     Ovalocytes Slight/1+     WBC Morphology Normal     Platelet Estimate Decreased    BNP [[id number]]  (Normal) Collected: 03/27/23 0919    Specimen: Blood Updated: 03/27/23 0944     proBNP 1,793.0 pg/mL     Narrative:      Among patients with dyspnea, NT-proBNP is highly sensitive for the detection of acute congestive heart failure. In addition NT-proBNP of <300 pg/ml effectively rules out acute congestive heart failure with 99% negative predictive value.          Imaging Results (Most Recent)     Procedure Component Value Units Date/Time    XR Knee 1 or 2 View Right [[id number]] Collected: 03/30/23 0724     Updated: 03/30/23 0821    Narrative:      PROCEDURE: Right knee x-rays with 2 views.    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  Mild narrowing of the medial lateral joint spaces and small  osteophytes along the medial femoral condylar region.    No joint effusion.    Heterogeneous bubbly and linear gas densities in the soft tissues  over the lateral aspect of the mid and distal thigh area. Could  be postoperative however infectious etiologies not excluded.      Impression:      No acute osseous abnormalities in the knee.  Degenerative changes medial and lateral joint space detailed  above.    Bubbly and linear gas densities in the soft tissues over the  lateral aspect mid and distal thigh. Could be postoperative in  etiology however infectious etiology not excluded.    [id number]          Electronically signed by:  Jareth Rodríguez MD  3/30/2023 8:19  AM CDT Workstation: [id number]    XR Femur 2 View Right [[id number]] Collected: 03/30/23 0724     Updated: 03/30/23 0821    Narrative:      PROCEDURE: Right femur x-rays with 2 views.    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    FINDINGS:    There has been interval surgery with hip replacement now present.  The femoral head component is aligned with the acetabular  component. The components normally and seated in respective  bony  elements.    Other than the recent hip surgery no acute osseous abnormalities  evident in the femur.    There are significant bubbly and linear gas densities in the soft  tissues over the lateral mid and distal thigh area. Could be  postoperative in nature however cannot exclude infectious  etiology.      Impression:      Right hip replacement normally aligned and normally seated bony  elements.    Significant bubbly and linear gas densities in the lateral mid  and distal thigh soft tissues of uncertain etiology. Could be  postoperative related surgery however cannot exclude underlying  infectious etiology include possibility of necrotizing fasciitis.    Findings personally discussed with Analia Jeong, Dr. Jesus  assistant at 8:15 AM CDT at time of this dictation.      [id number]    Electronically signed by:  Jareth Rodríguez MD  3/30/2023 8:18  AM CDT Workstation: [id number]    FL C Arm During Surgery [[id number]] Resulted: 03/28/23 1337     Updated: 03/28/23 1337    CT Cervical Spine Without Contrast [[id number]] Collected: 03/27/23 1016     Updated: 03/27/23 1124    Narrative:      EXAM: CT cervical spine without contrast  CLINICAL INDICATION: Trauma, injury    COMPARISON: No relevant priors available    TECHNIQUE:    2 mm axial slice thickness images cervical spine  No intravenous contrast was utilized for exam    All CT scans at this facility use dose modulation, iterative  reconstruction, and/or weight based dosing when appropriate to  reduce radiation dose to as low as reasonably achievable.    FINDINGS:    The visualized brain parenchyma is normal.    There is subperiosteal mucosal thickening of the sphenoid  sinuses. The bilateral mastoid air cells well pneumatized.    The neck soft tissues are symmetric without hematoma, laceration  or subcutaneous gas. There are surgical clips in the region of  left upper neck. There are paraseptal emphysematous changes and  areas of biapical intralobular septal  thickening. There is  calcific pleural/parenchymal scarring . No pleural effusion  within the field-of-view.    There is normal cervical lordosis without obvious fracture,  subluxation or aggressive/destructive change. There is no  evidence of traumatic distraction injury. There is multilevel  spondylitic change, endplate sclerosis and multilevel disc space  narrowing. Moderate to severe narrowing at C6-C7.    There is multilevel facet and uncovertebral joint hypertrophy.    There is no prevertebral soft tissue swelling/edema.      Impression:      CONCLUSION:    1. Cervical degenerative changes without fracture.    2. Chronic biapical edema along thickening which may represent  areas of pulmonary edema or atelectasis.    Electronically signed by:  Robert Sow DO  3/27/2023 11:22 AM  CDT Workstation: [id number]    CT Head Without Contrast [[id number]] Collected: 03/27/23 1016     Updated: 03/27/23 1116    Narrative:      EXAM: Head CT without contrast.   CLINICAL INDICATION: Head trauma trauma     COMPARISON: No relevant priors available    TECHNIQUE:      5 mm slice thickness images (vertex to skull base)   No intravenous contrast administered.   Coronal/sagittal reformations were employed.     All CT scans at this facility use dose modulation, iterative  reconstruction, and/or weight based dosing when appropriate to  reduce radiation dose to as low as reasonably achievable.     FINDINGS:      There is mild global parenchymal volume loss.    There is no intraparenchymal or intraventricular hemorrhage.  There is no intra-axial mass or extra-axial fluid collection.   There is no obvious midline shift or mass effect.      There is patchy periventricular hypoattenuation.    The pituitary gland appears normal. There is evidence of cataract  surgery. There is subperiosteal mucosal thickening of the  sphenoid sinuses. Bilateral mastoid air cells our clear.    There is no calvarial or scalp soft tissue abnormality.       Impression:      CONCLUSION:     1. No acute intracranial abnormality identified.  2. Age-related atrophy and microvascular changes.    Electronically signed by:  Robert Sow DO  3/27/2023 11:14 AM  CDT Workstation: [id number]    XR Femur 2 View Right [[id number]] Collected: 03/27/23 0923     Updated: 03/27/23 1015    Narrative:      PROCEDURE: Right femur x-rays with 2 views.    INDICATION: Trauma. Fall.    COMPARISON: Right hip x-ray same date.    FINDINGS:    Impacted subcapital fracture right hip with cephalad displacement  and varus angulation.    Femur distal to this down to the knee is intact with no  associated fractures distally.      Impression:      Impacted subcapital fracture right hip detailed  above.    Remainder right femur intact.      [id number]    Electronically signed by:  Jareth Rodríguez MD  3/27/2023 10:13  AM CDT Workstation: [id number]    XR Hip With or Without Pelvis 2 - 3 View Right [[id number]] Collected: 03/27/23 0923     Updated: 03/27/23 1013    Narrative:      PROCEDURE: Pelvis and right hip x-rays with 3 views.    INDICATION: Trauma. Fall.    COMPARISON: None.    FINDINGS:    There is a subcapital fracture of the right hip with impaction  and cephalad displacement as well as varus angulation.  Femoral head normally aligned with the acetabulum.    No other fractures evident in the bony pelvis or left hip.  Degenerative arthritic changes in the left hip.    Incidental finding of a 2.9-1.2 cm laminated gallbladder calculi  right mid abdomen.      Impression:      Subcapital right hip fracture with impaction and mild cephalad  displacement with varus angulation.    Couple laminated gallbladder calculi detailed above present on  previous CT abdomen 2018.    23027      [id number]    Electronically signed by:  Jareth Rodríguez MD  3/27/2023 10:11  AM CDT Workstation: [id number]    XR Humerus Left [[id number]] Collected: 03/27/23 0923     Updated: 03/27/23 1009    Narrative:      PROCEDURE: Left humerus  x-rays with 2 views.    INDICATION: Trauma. Fall.    COMPARISON: None.    FINDINGS:  No fracture or acute osseous abnormality in the left humerus.  No soft tissue abnormality.  The left shoulder and left elbow also appear to be intact.      Impression:      Negative left humerus.      [id number]    Electronically signed by:  Jareth Rodríguez MD  3/27/2023 10:07  AM CDT Workstation: [id number]    XR Shoulder 2+ View Left [[id number]] Collected: 03/27/23 0922     Updated: 03/27/23 1006    Narrative:      EXAM: XR SHOULDER 2 OR MORE VIEWS LEFT    HISTORY: Left shoulder pain.    COMPARISON: None    FINDINGS:    Mineralization: Osseous demineralization    Bones: No visualized fracture. Degenerative changes at the  acromioclavicular and glenohumeral joint.    Other: Nodular density left midlung      Impression:        No visualized fracture.    Nodular density left midlung. Correlate with dedicated chest  x-ray    Electronically signed by:  Mo Patton DO  3/27/2023 10:04 AM  CDT Workstation: [id number]          Chief Complaint on Day of Discharge: No new complaints    Hospital Course:  The patient is a 82 y.o. female with past medical history notable for CAD, primary biliary cirrhosis, thrombocytopenia, hypertension, and PVD who presented to [facility] with fall resulting in right hip fracture.  Patient was admitted and orthopedic surgery was consulted and she underwent surgical correction.  She did well postoperatively but was noted to have hypertension for which her medications were adjusted.  Due to her biliary cirrhosis she was also noted to have thrombocytopenia but her platelet count improved during her stay.  She was treated for DVT prophylaxis with Lovenox and will continue this as an outpatient.  She did well with therapy and was cleared for discharge home with home health.  She will follow-up with PCP and orthopedic surgery as an outpatient..      Condition on Discharge: Stable    Physical Exam on  "Discharge:  /68   Pulse 87   Temp 97.8 °F (36.6 °C) (Temporal)   Resp 18   Ht 165.1 cm (65\")   Wt 54 kg (119 lb)   SpO2 95%   BMI 19.80 kg/m²   Physical Exam  Constitutional:       General: She is not in acute distress.     Appearance: She is not toxic-appearing.   HENT:      Head: Normocephalic and atraumatic.      Right Ear: External ear normal.      Left Ear: External ear normal.      Nose: Nose normal.      Mouth/Throat:      Pharynx: Oropharynx is clear.   Eyes:      Conjunctiva/sclera: Conjunctivae normal.   Cardiovascular:      Rate and Rhythm: Normal rate and regular rhythm.      Heart sounds: Normal heart sounds.   Pulmonary:      Effort: Pulmonary effort is normal. No respiratory distress.      Breath sounds: Normal breath sounds.   Abdominal:      General: Bowel sounds are normal.      Palpations: Abdomen is soft.      Tenderness: There is no abdominal tenderness.   Musculoskeletal:         General: No deformity.   Skin:     General: Skin is warm and dry.      Capillary Refill: Capillary refill takes less than 2 seconds.   Neurological:      General: No focal deficit present.      Mental Status: She is alert and oriented to person, place, and time. Mental status is at baseline.   Psychiatric:         Behavior: Behavior normal.       Discharge Disposition:  Home-Health Care [facility]    Discharge Medications:     Discharge Medications      New Medications      Instructions Start Date   amLODIPine 5 MG tablet  Commonly known as: NORVASC   5 mg, Oral, Every 24 Hours Scheduled   Start Date: April 1, 2023     Enoxaparin Sodium 30 MG/0.3ML solution prefilled syringe syringe  Commonly known as: LOVENOX   Inject 0.3 mL (1 syringe) under the skin into the appropriate area as directed Daily for 28 days.      HYDROcodone-acetaminophen 7.5-325 MG per tablet  Commonly known as: NORCO  Replaces: HYDROcodone-acetaminophen 5-325 MG per tablet   1 tablet, Oral, Every 6 Hours PRN      losartan 25 MG tablet  Commonly " known as: COZAAR   25 mg, Oral, Every 24 Hours Scheduled   Start Date: April 1, 2023        Continue These Medications      Instructions Start Date   aspirin 81 MG EC tablet   81 mg, Oral, Daily      carvedilol 6.25 MG tablet  Commonly known as: COREG   No dose, route, or frequency recorded.      multivitamin with minerals tablet tablet   1 tablet, Oral, Daily      pantoprazole 40 MG EC tablet  Commonly known as: PROTONIX   40 mg, Oral, Daily      potassium gluconate 595 (99 K) MG tablet tablet   595 mg, Oral, Daily      tiZANidine 4 MG tablet  Commonly known as: ZANAFLEX   1 tablet, Oral, Every 8 Hours PRN      tiZANidine 4 MG tablet  Commonly known as: ZANAFLEX   4 mg, Oral, Nightly PRN      traMADol 50 MG tablet  Commonly known as: ULTRAM   50 mg, Oral, Every 8 Hours PRN      VITAMIN D PO   1,000 Units, Oral, Daily         Stop These Medications    HYDROcodone-acetaminophen 5-325 MG per tablet  Commonly known as: NORCO  Replaced by: HYDROcodone-acetaminophen 7.5-325 MG per tablet            Discharge Diet: Cardiac diet    Activity at Discharge: Gradually increase activity to prehospitalization level    Discharge Care Plan/Instructions: Take medications as prescribed, follow-up with PCP and orthopedic surgery, and return for worsening symptoms.    Follow-up Appointments:   Future Appointments   Date Time Provider Department Center   4/2/2023 To Be Determined Norma Amanda RN [facility]   4/3/2023 To Be Determined Odilia Gonsalez OT [facility]   4/4/2023 To Be Determined Analia Kay PT [facility]   4/13/2023  9:40 AM Sugey Stone APRN [facility]       Test Results Pending at Discharge: None    Marty Gloria MD    Time: 32 minutes      Electronically signed by Marty Gloria MD at 03/31/23 5212       Discharge Order (From admission, onward)     Start     Ordered    03/31/23 1354  Discharge patient  Once        Expected Discharge Date: 03/31/23    Discharge Disposition: Home-Health  [facility]    Physician of Record for Attribution - Please select from Treatment Team: DANYA TENORIO [[id number]]    Review needed by CMO to determine Physician of Record: No       Question Answer Comment   Physician of Record for Attribution - Please select from Treatment Team DANYA TENORIO    Review needed by CMO to determine Physician of Record No        03/31/23 8319

## 2023-04-04 ENCOUNTER — HOME CARE VISIT (OUTPATIENT)
Dept: HOME HEALTH SERVICES | Facility: CLINIC | Age: 83
End: 2023-04-04
Payer: MEDICARE

## 2023-04-04 ENCOUNTER — HOME CARE VISIT (OUTPATIENT)
Dept: HOME HEALTH SERVICES | Facility: HOME HEALTHCARE | Age: 83
End: 2023-04-04
Payer: MEDICARE

## 2023-04-04 VITALS
WEIGHT: 114 LBS | OXYGEN SATURATION: 98 % | BODY MASS INDEX: 18.32 KG/M2 | TEMPERATURE: 98.3 F | SYSTOLIC BLOOD PRESSURE: 142 MMHG | HEART RATE: 68 BPM | RESPIRATION RATE: 16 BRPM | HEIGHT: 66 IN | DIASTOLIC BLOOD PRESSURE: 58 MMHG

## 2023-04-04 PROCEDURE — G0299 HHS/HOSPICE OF RN EA 15 MIN: HCPCS

## 2023-04-04 NOTE — HOME HEALTH
BESS/CASE CONFERENCE NOTE            4/4/23 MICHELLE DIMAS RN                                    PT IS AN 81 YO WHITE FEMALE ADMITTED TO [facility] FOR SERVICES OF SN/PT/OT DUE TO RECENT HOSPITALIZATION FROM 3/27-3/31/23 DUE TO PT FELL OUTSIDE ON HER PORCH IN THE EARLY MORNING HOURS FEEDING HER CATS AND RESULTED IN A (R) HIP FRACTURE. DX: DISPLACED FRACTURE OF BASE OF NECK OF (R) FEMUR. PT HAS A HX OF CAD, PRIMARY BILIARY CIRRHOSIS, THROMBOCYTOPENIA, HTN, AND PVD.                         FOCUS OF CARE: DISPLACED FX OF BASE OF NECK OF (R) FEMUR. SN- CARDIOPULMONARY ASSESSMENT, ASSESS (R) HIP WD AND S/SX OF INFECTION, WOUND CARE TO SACRAL WD & THORACIC WD, A/I PAIN, S/S INFECTION, MEDICATION EDUCATION. PT/OT- THERAPEUTIC EXERCISES, GAIT TRAINING, TRANSFER TRAINING, HOME SAFETY ASSESSMENT, ADL'S, AND ENERGY CONSERVATION.

## 2023-04-04 NOTE — Clinical Note
BESS/CASE CONFERENCE NOTE  4/4/23  MICHELLE DIMAS RN      PT IS AN 83 YO WHITE FEMALE ADMITTED TO [facility] FOR SERVICES OF SN/PT/OT DUE TO RECENT HOSPITALIZATION FROM 3/27-3/31/23 DUE TO PT FELL OUTSIDE ON HER PORCH IN THE EARLY MORNING HOURS FEEDING HER CATS AND RESULTED IN A (R) HIP FRACTURE. DX: DISPLACED FRACTURE OF BASE OF NECK OF (R) FEMUR. PT HAS A HX OF CAD, PRIMARY BILIARY CIRRHOSIS, THROMBOCYTOPENIA, HTN, AND PVD.     FOCUS OF CARE: DISPLACED FX OF BASE OF NECK OF (R) FEMUR. SN- CARDIOPULMONARY ASSESSMENT, ASSESS (R) HIP WD AND S/SX OF INFECTION,  WOUND CARE TO SACRAL WD & THORACIC WD, A/I PAIN, S/S INFECTION, MEDICATION EDUCATION. PT/OT- THERAPEUTIC EXERCISES, GAIT TRAINING, TRANSFER TRAINING, HOME SAFETY ASSESSMENT, ADL'S, AND ENERGY CONSERVATION.     PT LIVES AT HOME WITH HER DAUGHTER, WHO IS A RETIRED NURSE THAT ASSIST PT. PT AMBULATES WITH A WALKER WITH SOME DIFFICULTY AND NEEDS ASSISTANCE TO CHANGE POSITIONS FROM A SITTING TO STANDING. PT STATED SHE HAS A LOT OF PAIN WHEN MOVING. PT HAS TRAMADOL AND HYDROCODONE FOR PAIN, BUT IS NOT TAKING ANY HYDROCODONE. SN DISCUSSED PAIN MEDICATION AND PAIN RELIEF.    MEDICATION ISSUES: ON RECONCILING PT MEDICAITONS NO MAJOR INTERACTION NOTED. PT DID STATE THAT SHE IS NO LONGER TAKING THE FOLLOWING MEDICATIONS:  ASA 81MG 1-DAILY  CARVEDILOL 6.25MG DAILY  LOSARTAN 25 MG DAILY  PANTOPRAZOLE 40MG EC DAILY  POTASSIUM GLUCONATE 595MG DAILY.    SKIN INTEGRITY: (R) HIP INCISION APPROXIMATED, STAGE 2 TO SACRAL AREA, STAGE 2 TO THORACIC AREA- WD ORDERS GIVEN PER JEANE/DR HOWARD.    CODE STATUS: FULL CODE    RECOMMENDED FREQUENCY FOR THIS PT:  SN-- 2WK2, 1WK3  PT-- EVALUATION  OT-- EVALUATION

## 2023-04-04 NOTE — CASE COMMUNICATION
SN scheduled to admit patient today however patient asked that admission to  be postponed due to daugther and grandson arriving from [place] today.

## 2023-04-05 ENCOUNTER — HOME CARE VISIT (OUTPATIENT)
Dept: HOME HEALTH SERVICES | Facility: CLINIC | Age: 83
End: 2023-04-05
Payer: MEDICARE

## 2023-04-05 ENCOUNTER — READMISSION MANAGEMENT (OUTPATIENT)
Dept: CALL CENTER | Facility: HOSPITAL | Age: 83
End: 2023-04-05
Payer: MEDICARE

## 2023-04-05 NOTE — OUTREACH NOTE
Total Joint Week 1 Survey    Flowsheet Row Responses   [facility] patient discharged from? [place]   Does the patient have one of the following disease processes/diagnoses(primary or secondary)? Total Joint Replacement   Joint surgery performed? Hip   Week 1 attempt successful? Yes   Call start time 0902   Call end time 0906   Discharge diagnosis Traumatic closed displaced fracture of neck of right femur, initial    Does the patient have all medications related to this admission filled (includes all antibiotics, pain medications, etc.) Yes   Is the patient taking all medications as directed (includes completed medication regime)? Yes   Does the patient have a follow up appointment with their surgeon? Yes   Has the patient kept scheduled appointments due by today? Yes   What is the Home health agency?  [facility]   Has home health visited the patient within 72 hours of discharge? Yes   Psychosocial issues? No   Has the patient fallen since discharge? No   What is the patient's perception of their functional status since discharge? Improving   Is the patient able to teach back signs and symptoms of infection? Incisional drainage, Blisters around incision, Increased swelling or redness around incision (not associated with surgical edema), Severe discomfort or pain, Changes in mobility, Temp >100.4 for 24h or longer   Is the patient able to teach back how to prevent infection? Check incision daily   Week 1 call completed? Yes          Kylah CRANDALL - Registered Nurse

## 2023-04-06 ENCOUNTER — HOME CARE VISIT (OUTPATIENT)
Dept: HOME HEALTH SERVICES | Facility: CLINIC | Age: 83
End: 2023-04-06
Payer: MEDICARE

## 2023-04-06 PROCEDURE — G0151 HHCP-SERV OF PT,EA 15 MIN: HCPCS

## 2023-04-06 NOTE — Clinical Note
"Huddle  / Case Conference Note  Medical Necessity and focus of care: patient had a fall in the yard with a right hip fracture. patient had total hip arthroplasty on the right resulting in right hip weakness, difficulty with transfers and gait and overall limited gait distance and fall risk patient needs skilled pt to address limiations and return patient to max potential  Caregiver Status: full time daugther  Patient's goal(s): \" get back to independent\"  GG Discharge Goal: 150 feet b6  Medication Issus:   Environmental/ Physical issues:  Any additional needs:    Based upon record review and collaboration conference, the recommended frequency for this patient is   SN-----  PT-----[id number]  OT----  ST-----  HHA---  MSW---  Provider_dr jeff__ Notified @ ____1320__ and agrees with POC     Please verify your eval  scores: (Answer the scores  that pertain to your area of focus remove the others)    5   2   3        M 1700        "

## 2023-04-07 ENCOUNTER — HOME CARE VISIT (OUTPATIENT)
Dept: HOME HEALTH SERVICES | Facility: CLINIC | Age: 83
End: 2023-04-07
Payer: MEDICARE

## 2023-04-07 VITALS
SYSTOLIC BLOOD PRESSURE: 128 MMHG | DIASTOLIC BLOOD PRESSURE: 78 MMHG | RESPIRATION RATE: 16 BRPM | TEMPERATURE: 97.1 F | OXYGEN SATURATION: 97 % | HEART RATE: 68 BPM

## 2023-04-07 VITALS
TEMPERATURE: 97.4 F | RESPIRATION RATE: 20 BRPM | DIASTOLIC BLOOD PRESSURE: 72 MMHG | SYSTOLIC BLOOD PRESSURE: 150 MMHG | OXYGEN SATURATION: 99 % | HEART RATE: 100 BPM

## 2023-04-07 PROCEDURE — G0300 HHS/HOSPICE OF LPN EA 15 MIN: HCPCS

## 2023-04-07 NOTE — HOME HEALTH
TAMMI WAS ADMITTED TO THE HOSPITAL ON 3/27/2023 SECONDSARY TO CLOSED FRACTURE OF THE NECK OF RIGHT FEMUR WITH A HIP ARTHROPLASTY WITH ANTERIOR APPROACH. PATIENT HAD GONE OUTSIDE TO FEED HER CATS AND SHE FELL IN THE YARD. SHE HAD SURGERY ON 3/28/2023. PATIENT WAS DISCHARGED HOME ON 3/31/2023. SHE LIVES AT HOME WITH HER DAUGTHER WHO HELPS CARE FOR HER. SHE IS USING HER WALKER AND HER DAUGTHER IS HELPING HER TO GET AROUND CORNERS AND STUFF IN HER HOME    PMHX: CAD, PULMOARY EMBOLOUS, PNEUMONIA, FIBROMYALGIA, MIXED HYPERLIPIDEMIA, HTN, BILIARY CIRRHOSIS, CAROTID STENOSIS, THROMBOCYTOPENIA    PATIENT GOAL:

## 2023-04-10 ENCOUNTER — HOME CARE VISIT (OUTPATIENT)
Dept: HOME HEALTH SERVICES | Facility: HOME HEALTHCARE | Age: 83
End: 2023-04-10
Payer: MEDICARE

## 2023-04-11 ENCOUNTER — HOME CARE VISIT (OUTPATIENT)
Dept: HOME HEALTH SERVICES | Facility: CLINIC | Age: 83
End: 2023-04-11
Payer: MEDICARE

## 2023-04-11 PROCEDURE — G0299 HHS/HOSPICE OF RN EA 15 MIN: HCPCS

## 2023-04-11 PROCEDURE — G0157 HHC PT ASSISTANT EA 15: HCPCS

## 2023-04-12 ENCOUNTER — HOME CARE VISIT (OUTPATIENT)
Dept: HOME HEALTH SERVICES | Facility: HOME HEALTHCARE | Age: 83
End: 2023-04-12
Payer: MEDICARE

## 2023-04-12 VITALS
DIASTOLIC BLOOD PRESSURE: 60 MMHG | SYSTOLIC BLOOD PRESSURE: 130 MMHG | TEMPERATURE: 97.6 F | RESPIRATION RATE: 18 BRPM | HEART RATE: 79 BPM | OXYGEN SATURATION: 96 %

## 2023-04-13 ENCOUNTER — APPOINTMENT (OUTPATIENT)
Dept: GENERAL RADIOLOGY | Facility: HOSPITAL | Age: 83
End: 2023-04-13
Payer: MEDICARE

## 2023-04-13 ENCOUNTER — READMISSION MANAGEMENT (OUTPATIENT)
Dept: CALL CENTER | Facility: HOSPITAL | Age: 83
End: 2023-04-13
Payer: MEDICARE

## 2023-04-13 ENCOUNTER — ANESTHESIA EVENT (OUTPATIENT)
Dept: PERIOP | Facility: HOSPITAL | Age: 83
End: 2023-04-13
Payer: MEDICARE

## 2023-04-13 ENCOUNTER — HOSPITAL ENCOUNTER (OUTPATIENT)
Facility: HOSPITAL | Age: 83
Discharge: HOME HEALTH | End: 2023-04-15
Attending: EMERGENCY MEDICINE | Admitting: FAMILY MEDICINE
Payer: MEDICARE

## 2023-04-13 ENCOUNTER — ANESTHESIA (OUTPATIENT)
Dept: PERIOP | Facility: HOSPITAL | Age: 83
End: 2023-04-13
Payer: MEDICARE

## 2023-04-13 ENCOUNTER — HOME CARE VISIT (OUTPATIENT)
Dept: HOME HEALTH SERVICES | Facility: HOME HEALTHCARE | Age: 83
End: 2023-04-13
Payer: MEDICARE

## 2023-04-13 VITALS
RESPIRATION RATE: 22 BRPM | TEMPERATURE: 98 F | OXYGEN SATURATION: 97 % | SYSTOLIC BLOOD PRESSURE: 116 MMHG | HEART RATE: 68 BPM | DIASTOLIC BLOOD PRESSURE: 70 MMHG

## 2023-04-13 DIAGNOSIS — I65.23 CAROTID STENOSIS, ASYMPTOMATIC, BILATERAL: ICD-10-CM

## 2023-04-13 DIAGNOSIS — I10 ESSENTIAL HYPERTENSION: ICD-10-CM

## 2023-04-13 DIAGNOSIS — I25.10 ATHEROSCLEROSIS OF NATIVE CORONARY ARTERY OF NATIVE HEART WITHOUT ANGINA PECTORIS: ICD-10-CM

## 2023-04-13 DIAGNOSIS — E87.6 HYPOKALEMIA: ICD-10-CM

## 2023-04-13 DIAGNOSIS — D64.9 ANEMIA, UNSPECIFIED TYPE: ICD-10-CM

## 2023-04-13 DIAGNOSIS — N17.9 AKI (ACUTE KIDNEY INJURY): ICD-10-CM

## 2023-04-13 DIAGNOSIS — Z74.09 IMPAIRED FUNCTIONAL MOBILITY, BALANCE, GAIT, AND ENDURANCE: ICD-10-CM

## 2023-04-13 DIAGNOSIS — Z74.09 IMPAIRED MOBILITY AND ADLS: ICD-10-CM

## 2023-04-13 DIAGNOSIS — Z78.9 IMPAIRED MOBILITY AND ADLS: ICD-10-CM

## 2023-04-13 DIAGNOSIS — S73.004A DISLOCATION OF RIGHT HIP, INITIAL ENCOUNTER: Primary | ICD-10-CM

## 2023-04-13 PROBLEM — E83.51 HYPOCALCEMIA: Status: ACTIVE | Noted: 2023-04-13

## 2023-04-13 PROBLEM — N18.30 CKD (CHRONIC KIDNEY DISEASE) STAGE 3, GFR 30-59 ML/MIN: Status: ACTIVE | Noted: 2023-04-13

## 2023-04-13 PROBLEM — D50.0 IRON DEFICIENCY ANEMIA DUE TO CHRONIC BLOOD LOSS: Status: ACTIVE | Noted: 2023-04-13

## 2023-04-13 LAB
ABO GROUP BLD: NORMAL
ALBUMIN SERPL-MCNC: 2.2 G/DL (ref 3.5–5.2)
ALBUMIN/GLOB SERPL: 0.7 G/DL
ALP SERPL-CCNC: 393 U/L (ref 39–117)
ALT SERPL W P-5'-P-CCNC: 27 U/L (ref 1–33)
ANION GAP SERPL CALCULATED.3IONS-SCNC: 10 MMOL/L (ref 5–15)
ANISOCYTOSIS BLD QL: ABNORMAL
AST SERPL-CCNC: 35 U/L (ref 1–32)
ATMOSPHERIC PRESS: 742 MMHG
BASE EXCESS BLDV CALC-SCNC: -2 MMOL/L (ref 0–2)
BDY SITE: ABNORMAL
BILIRUB SERPL-MCNC: 0.6 MG/DL (ref 0–1.2)
BLD GP AB SCN SERPL QL: NEGATIVE
BUN SERPL-MCNC: 18 MG/DL (ref 8–23)
BUN/CREAT SERPL: 10.8 (ref 7–25)
CALCIUM SPEC-SCNC: 7.6 MG/DL (ref 8.6–10.5)
CHLORIDE SERPL-SCNC: 107 MMOL/L (ref 98–107)
CO2 SERPL-SCNC: 21 MMOL/L (ref 22–29)
COHGB MFR BLD: 8.3 % (ref 0–5)
CREAT SERPL-MCNC: 1.67 MG/DL (ref 0.57–1)
DEPRECATED RDW RBC AUTO: 54.4 FL (ref 37–54)
EGFRCR SERPLBLD CKD-EPI 2021: 30.7 ML/MIN/1.73
EOSINOPHIL # BLD MANUAL: 0.04 10*3/MM3 (ref 0–0.4)
EOSINOPHIL NFR BLD MANUAL: 1 % (ref 0.3–6.2)
ERYTHROCYTE [DISTWIDTH] IN BLOOD BY AUTOMATED COUNT: 16.2 % (ref 12.3–15.4)
GLOBULIN UR ELPH-MCNC: 3 GM/DL
GLUCOSE SERPL-MCNC: 148 MG/DL (ref 65–99)
HCO3 BLDV-SCNC: 22.9 MMOL/L (ref 18–23)
HCT VFR BLD AUTO: 18.9 % (ref 34–46.6)
HGB BLD-MCNC: 5.8 G/DL (ref 12–15.9)
HGB BLDA-MCNC: 6.3 G/DL (ref 13.5–17.5)
HOLD SPECIMEN: NORMAL
HYPOCHROMIA BLD QL: ABNORMAL
LYMPHOCYTES # BLD MANUAL: 0.64 10*3/MM3 (ref 0.7–3.1)
LYMPHOCYTES NFR BLD MANUAL: 5 % (ref 5–12)
Lab: NORMAL
MAGNESIUM SERPL-MCNC: 1.8 MG/DL (ref 1.6–2.4)
MCH RBC QN AUTO: 27.9 PG (ref 26.6–33)
MCHC RBC AUTO-ENTMCNC: 30.7 G/DL (ref 31.5–35.7)
MCV RBC AUTO: 90.9 FL (ref 79–97)
METHGB BLD QL: 0.9 % (ref 0–3)
MODALITY: ABNORMAL
MONOCYTES # BLD: 0.21 10*3/MM3 (ref 0.1–0.9)
NEUTROPHILS # BLD AUTO: 3.36 10*3/MM3 (ref 1.7–7)
NEUTROPHILS NFR BLD MANUAL: 71 % (ref 42.7–76)
NEUTS BAND NFR BLD MANUAL: 8 % (ref 0–5)
NOTE: ABNORMAL
OXYHGB MFR BLDV: 90.6 % (ref 45–75)
PCO2 BLDV: 38.5 MM HG (ref 41–51)
PH BLDV: 7.38 PH UNITS (ref 7.29–7.37)
PLATELET # BLD AUTO: 99 10*3/MM3 (ref 140–450)
PMV BLD AUTO: 10.5 FL (ref 6–12)
PO2 BLDV: 123 MM HG (ref 27–53)
POTASSIUM BLDV-SCNC: 3 MMOL/L (ref 3.5–5)
POTASSIUM SERPL-SCNC: 3 MMOL/L (ref 3.5–5.2)
PROT SERPL-MCNC: 5.2 G/DL (ref 6–8.5)
QT INTERVAL: 464 MS
QTC INTERVAL: 470 MS
RBC # BLD AUTO: 2.08 10*6/MM3 (ref 3.77–5.28)
RH BLD: POSITIVE
SAO2 % BLDCOV: 99.8 % (ref 45–75)
SMALL PLATELETS BLD QL SMEAR: ABNORMAL
SODIUM BLDV-SCNC: 138 MMOL/L (ref 136–146)
SODIUM SERPL-SCNC: 138 MMOL/L (ref 136–145)
T&S EXPIRATION DATE: NORMAL
VARIANT LYMPHS NFR BLD MANUAL: 15 % (ref 19.6–45.3)
VENTILATOR MODE: ABNORMAL
WBC MORPH BLD: NORMAL
WBC NRBC COR # BLD: 4.25 10*3/MM3 (ref 3.4–10.8)
WHOLE BLOOD HOLD COAG: NORMAL

## 2023-04-13 PROCEDURE — 25010000002 FENTANYL CITRATE (PF) 100 MCG/2ML SOLUTION: Performed by: NURSE ANESTHETIST, CERTIFIED REGISTERED

## 2023-04-13 PROCEDURE — 86900 BLOOD TYPING SEROLOGIC ABO: CPT

## 2023-04-13 PROCEDURE — 25010000002 ONDANSETRON PER 1 MG: Performed by: NURSE ANESTHETIST, CERTIFIED REGISTERED

## 2023-04-13 PROCEDURE — 93005 ELECTROCARDIOGRAM TRACING: CPT

## 2023-04-13 PROCEDURE — P9016 RBC LEUKOCYTES REDUCED: HCPCS

## 2023-04-13 PROCEDURE — 96361 HYDRATE IV INFUSION ADD-ON: CPT

## 2023-04-13 PROCEDURE — G0378 HOSPITAL OBSERVATION PER HR: HCPCS

## 2023-04-13 PROCEDURE — 96365 THER/PROPH/DIAG IV INF INIT: CPT

## 2023-04-13 PROCEDURE — 80053 COMPREHEN METABOLIC PANEL: CPT | Performed by: EMERGENCY MEDICINE

## 2023-04-13 PROCEDURE — 86850 RBC ANTIBODY SCREEN: CPT | Performed by: STUDENT IN AN ORGANIZED HEALTH CARE EDUCATION/TRAINING PROGRAM

## 2023-04-13 PROCEDURE — 76000 FLUOROSCOPY <1 HR PHYS/QHP: CPT

## 2023-04-13 PROCEDURE — 85007 BL SMEAR W/DIFF WBC COUNT: CPT | Performed by: EMERGENCY MEDICINE

## 2023-04-13 PROCEDURE — 96375 TX/PRO/DX INJ NEW DRUG ADDON: CPT

## 2023-04-13 PROCEDURE — 86900 BLOOD TYPING SEROLOGIC ABO: CPT | Performed by: STUDENT IN AN ORGANIZED HEALTH CARE EDUCATION/TRAINING PROGRAM

## 2023-04-13 PROCEDURE — 99285 EMERGENCY DEPT VISIT HI MDM: CPT

## 2023-04-13 PROCEDURE — 82805 BLOOD GASES W/O2 SATURATION: CPT

## 2023-04-13 PROCEDURE — 25010000002 MORPHINE PER 10 MG: Performed by: EMERGENCY MEDICINE

## 2023-04-13 PROCEDURE — 73502 X-RAY EXAM HIP UNI 2-3 VIEWS: CPT

## 2023-04-13 PROCEDURE — A9270 NON-COVERED ITEM OR SERVICE: HCPCS | Performed by: ORTHOPAEDIC SURGERY

## 2023-04-13 PROCEDURE — 27266 TREAT HIP DISLOCATION: CPT | Performed by: ORTHOPAEDIC SURGERY

## 2023-04-13 PROCEDURE — 83735 ASSAY OF MAGNESIUM: CPT | Performed by: STUDENT IN AN ORGANIZED HEALTH CARE EDUCATION/TRAINING PROGRAM

## 2023-04-13 PROCEDURE — 36430 TRANSFUSION BLD/BLD COMPNT: CPT

## 2023-04-13 PROCEDURE — 73552 X-RAY EXAM OF FEMUR 2/>: CPT

## 2023-04-13 PROCEDURE — 0 POTASSIUM CHLORIDE 10 MEQ/100ML SOLUTION: Performed by: STUDENT IN AN ORGANIZED HEALTH CARE EDUCATION/TRAINING PROGRAM

## 2023-04-13 PROCEDURE — 86901 BLOOD TYPING SEROLOGIC RH(D): CPT | Performed by: STUDENT IN AN ORGANIZED HEALTH CARE EDUCATION/TRAINING PROGRAM

## 2023-04-13 PROCEDURE — 86923 COMPATIBILITY TEST ELECTRIC: CPT

## 2023-04-13 PROCEDURE — 25010000002 PROPOFOL 200 MG/20ML EMULSION: Performed by: NURSE ANESTHETIST, CERTIFIED REGISTERED

## 2023-04-13 PROCEDURE — 27266 TREAT HIP DISLOCATION: CPT | Performed by: SPECIALIST/TECHNOLOGIST, OTHER

## 2023-04-13 PROCEDURE — 85025 COMPLETE CBC W/AUTO DIFF WBC: CPT | Performed by: EMERGENCY MEDICINE

## 2023-04-13 PROCEDURE — 82820 HEMOGLOBIN-OXYGEN AFFINITY: CPT

## 2023-04-13 PROCEDURE — 71045 X-RAY EXAM CHEST 1 VIEW: CPT

## 2023-04-13 PROCEDURE — 63710000001 CARVEDILOL 3.125 MG TABLET: Performed by: ORTHOPAEDIC SURGERY

## 2023-04-13 PROCEDURE — 25010000002 ONDANSETRON PER 1 MG: Performed by: EMERGENCY MEDICINE

## 2023-04-13 RX ORDER — AMLODIPINE BESYLATE 5 MG/1
5 TABLET ORAL
Status: DISCONTINUED | OUTPATIENT
Start: 2023-04-14 | End: 2023-04-15 | Stop reason: HOSPADM

## 2023-04-13 RX ORDER — SODIUM CHLORIDE 0.9 % (FLUSH) 0.9 %
10 SYRINGE (ML) INJECTION EVERY 12 HOURS SCHEDULED
Status: DISCONTINUED | OUTPATIENT
Start: 2023-04-13 | End: 2023-04-15 | Stop reason: HOSPADM

## 2023-04-13 RX ORDER — LIDOCAINE HYDROCHLORIDE 20 MG/ML
INJECTION, SOLUTION EPIDURAL; INFILTRATION; INTRACAUDAL; PERINEURAL AS NEEDED
Status: DISCONTINUED | OUTPATIENT
Start: 2023-04-13 | End: 2023-04-13 | Stop reason: SURG

## 2023-04-13 RX ORDER — POTASSIUM CHLORIDE 7.45 MG/ML
10 INJECTION INTRAVENOUS ONCE
Status: COMPLETED | OUTPATIENT
Start: 2023-04-13 | End: 2023-04-13

## 2023-04-13 RX ORDER — ACETAMINOPHEN 325 MG/1
650 TABLET ORAL ONCE AS NEEDED
Status: DISCONTINUED | OUTPATIENT
Start: 2023-04-13 | End: 2023-04-15 | Stop reason: HOSPADM

## 2023-04-13 RX ORDER — ONDANSETRON 2 MG/ML
4 INJECTION INTRAMUSCULAR; INTRAVENOUS ONCE AS NEEDED
Status: DISCONTINUED | OUTPATIENT
Start: 2023-04-13 | End: 2023-04-15 | Stop reason: HOSPADM

## 2023-04-13 RX ORDER — HYDROCODONE BITARTRATE AND ACETAMINOPHEN 7.5; 325 MG/1; MG/1
1 TABLET ORAL EVERY 6 HOURS PRN
Status: DISCONTINUED | OUTPATIENT
Start: 2023-04-13 | End: 2023-04-15 | Stop reason: HOSPADM

## 2023-04-13 RX ORDER — ONDANSETRON 2 MG/ML
INJECTION INTRAMUSCULAR; INTRAVENOUS AS NEEDED
Status: DISCONTINUED | OUTPATIENT
Start: 2023-04-13 | End: 2023-04-13 | Stop reason: SURG

## 2023-04-13 RX ORDER — SODIUM CHLORIDE 0.9 % (FLUSH) 0.9 %
10 SYRINGE (ML) INJECTION AS NEEDED
Status: DISCONTINUED | OUTPATIENT
Start: 2023-04-13 | End: 2023-04-15 | Stop reason: HOSPADM

## 2023-04-13 RX ORDER — SODIUM CHLORIDE 9 MG/ML
75 INJECTION, SOLUTION INTRAVENOUS CONTINUOUS
Status: DISCONTINUED | OUTPATIENT
Start: 2023-04-13 | End: 2023-04-14

## 2023-04-13 RX ORDER — TRAMADOL HYDROCHLORIDE 50 MG/1
50 TABLET ORAL EVERY 12 HOURS PRN
Status: DISCONTINUED | OUTPATIENT
Start: 2023-04-13 | End: 2023-04-15 | Stop reason: HOSPADM

## 2023-04-13 RX ORDER — FLUMAZENIL 0.1 MG/ML
0.2 INJECTION INTRAVENOUS AS NEEDED
Status: DISCONTINUED | OUTPATIENT
Start: 2023-04-13 | End: 2023-04-15 | Stop reason: HOSPADM

## 2023-04-13 RX ORDER — CARVEDILOL 3.12 MG/1
3.12 TABLET ORAL 2 TIMES DAILY WITH MEALS
Status: DISCONTINUED | OUTPATIENT
Start: 2023-04-13 | End: 2023-04-15 | Stop reason: HOSPADM

## 2023-04-13 RX ORDER — NALOXONE HCL 0.4 MG/ML
0.4 VIAL (ML) INJECTION AS NEEDED
Status: DISCONTINUED | OUTPATIENT
Start: 2023-04-13 | End: 2023-04-15 | Stop reason: HOSPADM

## 2023-04-13 RX ORDER — ONDANSETRON 2 MG/ML
4 INJECTION INTRAMUSCULAR; INTRAVENOUS ONCE
Status: COMPLETED | OUTPATIENT
Start: 2023-04-13 | End: 2023-04-13

## 2023-04-13 RX ORDER — ACETAMINOPHEN 650 MG/1
650 SUPPOSITORY RECTAL ONCE AS NEEDED
Status: DISCONTINUED | OUTPATIENT
Start: 2023-04-13 | End: 2023-04-15 | Stop reason: HOSPADM

## 2023-04-13 RX ORDER — MELATONIN
1000 DAILY
Status: DISCONTINUED | OUTPATIENT
Start: 2023-04-14 | End: 2023-04-15 | Stop reason: HOSPADM

## 2023-04-13 RX ORDER — HYDROCODONE BITARTRATE AND ACETAMINOPHEN 7.5; 325 MG/1; MG/1
1 TABLET ORAL EVERY 6 HOURS PRN
Status: DISCONTINUED | OUTPATIENT
Start: 2023-04-13 | End: 2023-04-13

## 2023-04-13 RX ORDER — MORPHINE SULFATE 2 MG/ML
2 INJECTION, SOLUTION INTRAMUSCULAR; INTRAVENOUS ONCE
Status: COMPLETED | OUTPATIENT
Start: 2023-04-13 | End: 2023-04-13

## 2023-04-13 RX ORDER — EPHEDRINE SULFATE 50 MG/ML
5 INJECTION, SOLUTION INTRAVENOUS ONCE AS NEEDED
Status: DISCONTINUED | OUTPATIENT
Start: 2023-04-13 | End: 2023-04-15 | Stop reason: HOSPADM

## 2023-04-13 RX ORDER — LOSARTAN POTASSIUM 25 MG/1
25 TABLET ORAL
Status: DISCONTINUED | OUTPATIENT
Start: 2023-04-14 | End: 2023-04-15 | Stop reason: HOSPADM

## 2023-04-13 RX ORDER — PANTOPRAZOLE SODIUM 40 MG/1
40 TABLET, DELAYED RELEASE ORAL DAILY
Status: DISCONTINUED | OUTPATIENT
Start: 2023-04-14 | End: 2023-04-15 | Stop reason: HOSPADM

## 2023-04-13 RX ORDER — PROPOFOL 10 MG/ML
INJECTION, EMULSION INTRAVENOUS AS NEEDED
Status: DISCONTINUED | OUTPATIENT
Start: 2023-04-13 | End: 2023-04-13 | Stop reason: SURG

## 2023-04-13 RX ORDER — TIZANIDINE 4 MG/1
4 TABLET ORAL EVERY 8 HOURS PRN
Status: DISCONTINUED | OUTPATIENT
Start: 2023-04-13 | End: 2023-04-15 | Stop reason: HOSPADM

## 2023-04-13 RX ORDER — FENTANYL CITRATE 50 UG/ML
INJECTION, SOLUTION INTRAMUSCULAR; INTRAVENOUS AS NEEDED
Status: DISCONTINUED | OUTPATIENT
Start: 2023-04-13 | End: 2023-04-13 | Stop reason: SURG

## 2023-04-13 RX ORDER — SODIUM CHLORIDE 9 MG/ML
40 INJECTION, SOLUTION INTRAVENOUS AS NEEDED
Status: DISCONTINUED | OUTPATIENT
Start: 2023-04-13 | End: 2023-04-15 | Stop reason: HOSPADM

## 2023-04-13 RX ADMIN — POTASSIUM CHLORIDE 10 MEQ: 7.46 INJECTION, SOLUTION INTRAVENOUS at 19:46

## 2023-04-13 RX ADMIN — CARVEDILOL 3.12 MG: 3.12 TABLET, FILM COATED ORAL at 23:52

## 2023-04-13 RX ADMIN — ONDANSETRON 4 MG: 2 INJECTION INTRAMUSCULAR; INTRAVENOUS at 18:10

## 2023-04-13 RX ADMIN — FENTANYL CITRATE 25 MCG: 50 INJECTION, SOLUTION INTRAMUSCULAR; INTRAVENOUS at 21:51

## 2023-04-13 RX ADMIN — SODIUM CHLORIDE 1000 ML: 9 INJECTION, SOLUTION INTRAVENOUS at 19:45

## 2023-04-13 RX ADMIN — SODIUM CHLORIDE 75 ML/HR: 9 INJECTION, SOLUTION INTRAVENOUS at 18:09

## 2023-04-13 RX ADMIN — LIDOCAINE HYDROCHLORIDE 60 MG: 20 INJECTION, SOLUTION EPIDURAL; INFILTRATION; INTRACAUDAL; PERINEURAL at 21:50

## 2023-04-13 RX ADMIN — MORPHINE SULFATE 2 MG: 2 INJECTION, SOLUTION INTRAMUSCULAR; INTRAVENOUS at 18:10

## 2023-04-13 RX ADMIN — PROPOFOL 70 MG: 10 INJECTION, EMULSION INTRAVENOUS at 21:51

## 2023-04-13 RX ADMIN — ONDANSETRON 4 MG: 2 INJECTION INTRAMUSCULAR; INTRAVENOUS at 22:04

## 2023-04-13 NOTE — ED PROVIDER NOTES
Subjective   History of Present Illness  81yo female pmh significant htn/cad/carotid stenosis/primary biliary cirrhosis presents ED POD#16 (R) total hip arthroplasty c/o acute onset (R) hip pain/deformity 1400hrs today while attempting to cross her legs.  ROS otherwise noncontributory.    History provided by:  Patient  Hip Injury      Review of Systems   Constitutional: Negative.    HENT: Negative.    Respiratory: Negative.    Cardiovascular: Negative.    Gastrointestinal: Negative.    Genitourinary: Negative.    Musculoskeletal: Positive for arthralgias.   Skin: Negative.    All other systems reviewed and are negative.      Past Medical History:   Diagnosis Date   • Allergic rhinitis    • Coronary atherosclerosis of native coronary artery    • Depression    • Fibromyalgia    • GERD (gastroesophageal reflux disease)    • Hyperlipidemia    • Hypertension    • On anticoagulant therapy    • Peptic ulcer    • Peripheral vascular disease, unspecified    • Pneumonia    • Pulmonary embolus        No Known Allergies    Past Surgical History:   Procedure Laterality Date   • CAROTID ENDARTERECTOMY Left    • COLONOSCOPY N/A 2019    Procedure: COLONOSCOPY;  Surgeon: Maurice Carmona DO;  Location: [facility] ENDOSCOPY;  Service: Gastroenterology   • ENDOSCOPY N/A 2019    Procedure: ESOPHAGOGASTRODUODENOSCOPY;  Surgeon: Maurice Carmona DO;  Location: [facility] ENDOSCOPY;  Service: Gastroenterology   • HIP BIPOLAR REPLACEMENT Right 3/28/2023    Procedure: RIGHT HIP BIPOLAR ANTERIOR APPROACH;  Surgeon: Juan Carlos Jesus MD;  Location: [facility] OR;  Service: Orthopedics;  Laterality: Right;   • KNEE SURGERY         Family History   Problem Relation Age of Onset   • COPD Mother    • Other Father         Dad  from gunshot wound.   • Cancer Other    • Thyroid disease Other        Social History     Socioeconomic History   • Marital status:    Tobacco Use   • Smoking status: Former   • Smokeless tobacco: Never    Vaping Use   • Vaping Use: Never used   Substance and Sexual Activity   • Alcohol use: No   • Drug use: No   • Sexual activity: Defer           Objective   Physical Exam  Vitals and nursing note reviewed.   Constitutional:       Appearance: Normal appearance.   HENT:      Head: Normocephalic and atraumatic.      Right Ear: External ear normal.      Left Ear: External ear normal.      Nose: Nose normal.      Mouth/Throat:      Mouth: Mucous membranes are dry.   Eyes:      Pupils: Pupils are equal, round, and reactive to light.   Cardiovascular:      Rate and Rhythm: Normal rate and regular rhythm.      Pulses: Normal pulses.      Heart sounds: Normal heart sounds. No murmur heard.    No friction rub. No gallop.   Pulmonary:      Effort: Pulmonary effort is normal. No respiratory distress.      Breath sounds: Normal breath sounds. No wheezing, rhonchi or rales.   Abdominal:      General: Abdomen is flat. Bowel sounds are normal. There is no distension.      Palpations: Abdomen is soft.      Tenderness: There is no abdominal tenderness. There is no guarding or rebound.   Musculoskeletal:         General: Tenderness and deformity present.      Cervical back: Normal range of motion and neck supple. No rigidity.      Right hip: Deformity, tenderness and bony tenderness present. Decreased range of motion.      Right lower leg: Edema present.      Left lower leg: Edema present.        Legs:    Lymphadenopathy:      Cervical: No cervical adenopathy.   Skin:     General: Skin is warm and dry.   Neurological:      Mental Status: She is alert.         Procedures           ED Course  ED Course as of 04/13/23 1930   Thu Apr 13, 2023 1817 Checkout Dr. Gaytan 1830hrs pending radiologic evaluation/labs/disposition. [SD]   1926 Risks and benefits of blood transfusion discussed with patient; including, but not limited to infection and allergic reaction. Patient gave their verbal consent for the transfusion if needed. [BH]   1930  Patient checked out to me by Dr. Javier.  Patient has a hemoglobin of 6, potassium 3.0.  Right hip is dislocated.  Potassium ordered.  Spoke with orthopedics who plan to take the patient to the OR to reduce the hip.  Discussed the anemia and it likely is postoperative.  Patient is asymptomatic and denies melena, hematochezia, hemoptysis.  Discussed with the on-call hospitalist who agrees to admit. [BH]      ED Course User Index  [BH] Kieran Gaytan MD  [SD] Christian Javier MD      Labs Reviewed   COMPREHENSIVE METABOLIC PANEL - Abnormal; Notable for the following components:       Result Value    Glucose 148 (*)     Creatinine 1.67 (*)     Potassium 3.0 (*)     CO2 21.0 (*)     Calcium 7.6 (*)     Total Protein 5.2 (*)     Albumin 2.2 (*)     AST (SGOT) 35 (*)     Alkaline Phosphatase 393 (*)     eGFR 30.7 (*)     All other components within normal limits    Narrative:     GFR Normal >60  Chronic Kidney Disease <60  Kidney Failure <15    The GFR formula is only valid for adults with stable renal function between ages 18 and 70.   CBC WITH AUTO DIFFERENTIAL - Abnormal; Notable for the following components:    RBC 2.08 (*)     Hemoglobin 5.8 (*)     Hematocrit 18.9 (*)     MCHC 30.7 (*)     RDW 16.2 (*)     RDW-SD 54.4 (*)     Platelets 99 (*)     All other components within normal limits   BLOOD GAS, VENOUS   MANUAL DIFFERENTIAL   MAGNESIUM   PREPARE RBC   TYPE AND SCREEN   PREVIOUS HISTORY   CBC AND DIFFERENTIAL    Narrative:     The following orders were created for panel order CBC & Differential.  Procedure                               Abnormality         Status                     ---------                               -----------         ------                     CBC Auto Differential[[id number]]        Abnormal            Final result               Scan Slide[[id number]]                                                                    Please view results for these tests on the individual orders.   EXTRA  TUBES    Narrative:     The following orders were created for panel order Extra Tubes.  Procedure                               Abnormality         Status                     ---------                               -----------         ------                     Gold Top - SST[[id number]]                                   Final result               Light Blue Top[[id number]]                                   Final result                 Please view results for these tests on the individual orders.   GOLD TOP - SST   LIGHT BLUE TOP     XR Shoulder 2+ View Left    Result Date: 3/27/2023  Narrative: EXAM: XR SHOULDER 2 OR MORE VIEWS LEFT HISTORY: Left shoulder pain. COMPARISON: None FINDINGS: Mineralization: Osseous demineralization Bones: No visualized fracture. Degenerative changes at the acromioclavicular and glenohumeral joint. Other: Nodular density left midlung     Impression: No visualized fracture. Nodular density left midlung. Correlate with dedicated chest x-ray Electronically signed by:  Mo Patton DO  3/27/2023 10:04 AM CDT Workstation: [id number]    XR Humerus Left    Result Date: 3/27/2023  Narrative: PROCEDURE: Left humerus x-rays with 2 views. INDICATION: Trauma. Fall. COMPARISON: None. FINDINGS: No fracture or acute osseous abnormality in the left humerus. No soft tissue abnormality. The left shoulder and left elbow also appear to be intact.     Impression: Negative left humerus. [id number] Electronically signed by:  Jareth Rodríguez MD  3/27/2023 10:07 AM CDT Workstation: [id number]    XR Femur 2 View Right    Result Date: 4/13/2023  Narrative: XR FEMUR 2 VW RIGHT HISTORY: Hip pain. FINDINGS: No acute osseous fracture.  Soft tissues are grossly unremarkable.  Right hip dislocation better delineated on dedicated hip radiograph.     XR Femur 2 View Right    Result Date: 3/30/2023  Narrative: PROCEDURE: Right femur x-rays with 2 views. INDICATION: pain after hip surgery, S72.001A Fracture of unspecified part  of neck of right femur, initial encounter for closed fracture D72.819 Decreased white blood cell count, unspecified D69.6 Thrombocytopenia, unspecified E87.6 Hypokalemia R00.1 Bradycardia, unspecified S72.001A Fracture of unspecified part of neck of right femur, initial encounter for closed fracture I65.23 Occlusion and stenosis of bila COMPARISON: 3/27/2023 right femur and hip x-rays. FINDINGS: There has been interval surgery with hip replacement now present. The femoral head component is aligned with the acetabular component. The components normally and seated in respective bony elements. Other than the recent hip surgery no acute osseous abnormalities evident in the femur. There are significant bubbly and linear gas densities in the soft tissues over the lateral mid and distal thigh area. Could be postoperative in nature however cannot exclude infectious etiology.     Impression: Right hip replacement normally aligned and normally seated bony elements. Significant bubbly and linear gas densities in the lateral mid and distal thigh soft tissues of uncertain etiology. Could be postoperative related surgery however cannot exclude underlying infectious etiology include possibility of necrotizing fasciitis. Findings personally discussed with Dr. Ann Marie Willett assistant at 8:15 AM CDT at time of this dictation. [id number] Electronically signed by:  Jareth Rodríguez MD  3/30/2023 8:18 AM CDT Workstation: [id number]    XR Femur 2 View Right    Result Date: 3/27/2023  Narrative: PROCEDURE: Right femur x-rays with 2 views. INDICATION: Trauma. Fall. COMPARISON: Right hip x-ray same date. FINDINGS: Impacted subcapital fracture right hip with cephalad displacement and varus angulation. Femur distal to this down to the knee is intact with no associated fractures distally.     Impression: Impacted subcapital fracture right hip detailed above. Remainder right femur intact. [id number] Electronically signed by:  Jareth Rodríguez MD   3/27/2023 10:13 AM CDT Workstation: [id number]    XR Knee 1 or 2 View Right    Result Date: 3/30/2023  Narrative: PROCEDURE: Right knee x-rays with 2 views. INDICATION: pain after hip surgery, S72.001A Fracture of unspecified part of neck of right femur, initial encounter for closed fracture D72.819 Decreased white blood cell count, unspecified D69.6 Thrombocytopenia, unspecified E87.6 Hypokalemia R00.1 Bradycardia, unspecified S72.001A Fracture of unspecified part of neck of right femur, initial encounter for closed fracture I65.23 Occlusion and stenosis of bila No fracture or acute osseous abnormality in the right knee. Mild narrowing of the medial lateral joint spaces and small osteophytes along the medial femoral condylar region. No joint effusion. Heterogeneous bubbly and linear gas densities in the soft tissues over the lateral aspect of the mid and distal thigh area. Could be postoperative however infectious etiologies not excluded.     Impression: No acute osseous abnormalities in the knee. Degenerative changes medial and lateral joint space detailed above. Bubbly and linear gas densities in the soft tissues over the lateral aspect mid and distal thigh. Could be postoperative in etiology however infectious etiology not excluded. [id number] Electronically signed by:  Jareth Rodríguez MD  3/30/2023 8:19 AM CDT Workstation: [id number]    CT Head Without Contrast    Result Date: 3/27/2023  Narrative: EXAM: Head CT without contrast. CLINICAL INDICATION: Head trauma trauma COMPARISON: No relevant priors available TECHNIQUE:  5 mm slice thickness images (vertex to skull base) No intravenous contrast administered. Coronal/sagittal reformations were employed. All CT scans at this facility use dose modulation, iterative reconstruction, and/or weight based dosing when appropriate to reduce radiation dose to as low as reasonably achievable. FINDINGS:  There is mild global parenchymal volume loss. There is no intraparenchymal  or intraventricular hemorrhage. There is no intra-axial mass or extra-axial fluid collection. There is no obvious midline shift or mass effect.  There is patchy periventricular hypoattenuation. The pituitary gland appears normal. There is evidence of cataract surgery. There is subperiosteal mucosal thickening of the sphenoid sinuses. Bilateral mastoid air cells our clear. There is no calvarial or scalp soft tissue abnormality.     Impression: CONCLUSION: 1. No acute intracranial abnormality identified. 2. Age-related atrophy and microvascular changes. Electronically signed by:  Robert Sow DO  3/27/2023 11:14 AM CDT Workstation: [id number]    CT Cervical Spine Without Contrast    Result Date: 3/27/2023  Narrative: EXAM: CT cervical spine without contrast CLINICAL INDICATION: Trauma, injury COMPARISON: No relevant priors available TECHNIQUE: 2 mm axial slice thickness images cervical spine No intravenous contrast was utilized for exam All CT scans at this facility use dose modulation, iterative reconstruction, and/or weight based dosing when appropriate to reduce radiation dose to as low as reasonably achievable. FINDINGS: The visualized brain parenchyma is normal. There is subperiosteal mucosal thickening of the sphenoid sinuses. The bilateral mastoid air cells well pneumatized. The neck soft tissues are symmetric without hematoma, laceration or subcutaneous gas. There are surgical clips in the region of left upper neck. There are paraseptal emphysematous changes and areas of biapical intralobular septal thickening. There is calcific pleural/parenchymal scarring . No pleural effusion within the field-of-view. There is normal cervical lordosis without obvious fracture, subluxation or aggressive/destructive change. There is no evidence of traumatic distraction injury. There is multilevel spondylitic change, endplate sclerosis and multilevel disc space narrowing. Moderate to severe narrowing at C6-C7. There is  multilevel facet and uncovertebral joint hypertrophy. There is no prevertebral soft tissue swelling/edema.     Impression: CONCLUSION: 1. Cervical degenerative changes without fracture. 2. Chronic biapical edema along thickening which may represent areas of pulmonary edema or atelectasis. Electronically signed by:  Robert Sow DO  3/27/2023 11:22 AM CDT Workstation: [id number]    XR Chest 1 View    Result Date: 4/13/2023  Narrative: FINDINGS: Low lung volumes. There is slight elevation of the left hemidiaphragm with left basilar atelectasis versus infiltrate.   There is mild vascular congestion.     Impression: 1.  Slight elevation of the left hemidiaphragm.  Mild left basilar atelectasis versus infiltrate. 2.  The heart size is normal.  There is slight central vascular congestion.    XR Hip With or Without Pelvis 2 - 3 View Right    Result Date: 4/13/2023  Narrative: XR HIP RIGHT W-OR-WO PELVIS 2-3 VIEWS HISTORY: Hip dislocation. FINDINGS: Right hip hemiarthroplasty which is dislocated and proximally retracted by approximately 8 cm.  Nonobstructive bowel gas pattern partially obscuring the sacrum. No clear acute fracture periprosthetic or otherwise.     XR Hip With or Without Pelvis 2 - 3 View Right    Result Date: 3/27/2023  Narrative: PROCEDURE: Pelvis and right hip x-rays with 3 views. INDICATION: Trauma. Fall. COMPARISON: None. FINDINGS: There is a subcapital fracture of the right hip with impaction and cephalad displacement as well as varus angulation. Femoral head normally aligned with the acetabulum. No other fractures evident in the bony pelvis or left hip. Degenerative arthritic changes in the left hip. Incidental finding of a 2.9-1.2 cm laminated gallbladder calculi right mid abdomen.     Impression: Subcapital right hip fracture with impaction and mild cephalad displacement with varus angulation. Couple laminated gallbladder calculi detailed above present on previous CT abdomen 2018. [id number]  Electronically signed by:  Jareth Rodríguez MD  3/27/2023 10:11 AM CDT Workstation: [id number]                                         [facility]    Final diagnoses:   Dislocation of right hip, initial encounter   Hypokalemia   Anemia, unspecified type   BOB (acute kidney injury)       ED Disposition  ED Disposition     ED Disposition   Decision to Admit    Condition   --    Comment   Level of Care: Telemetry [5]   Diagnosis: Dislocation of right hip, initial encounter [[id number]]   Admitting Physician: KATE SINGER [[id number]]   Attending Physician: KATE SINGER [[id number]]               No follow-up provider specified.       Medication List      No changes were made to your prescriptions during this visit.          Kieran Gaytan MD  04/13/23 1930

## 2023-04-13 NOTE — OUTREACH NOTE
Total Joint Week 2 Survey    Flowsheet Row Responses   [facility] patient discharged from? [place]   Does the patient have one of the following disease processes/diagnoses(primary or secondary)? Total Joint Replacement   Joint surgery performed? Hip   Week 2 attempt successful? Yes   Call start time 1157   Call end time 1200   Has the patient been back in either the hospital or Emergency Department since discharge? No   Discharge diagnosis Traumatic closed displaced fracture of neck of right femur, initial    Is patient permission given to speak with other caregiver? Yes   Person spoke with today (if not patient) and relationship Daughter   Medication alerts for this patient Felicita    Does the patient have all medications related to this admission filled (includes all antibiotics, pain medications, etc.) Yes   Is the patient taking all medications as directed (includes completed medication regime)? Yes   Is the patient able to teach back alternate methods of pain control? Ice, Reposition, Correct alignment   Medication comments She is ambulating   Does the patient have a follow up appointment with their surgeon? Yes   Has the patient kept scheduled appointments due by today? Yes   What is the Home health agency?  [facility]   Has home health visited the patient within 72 hours of discharge? Yes   What DME was ordered? Walker per [facility]    Has all DME been delivered? Yes   Psychosocial issues? No   Has the patient began therapy sessions (either in the home or as an out patient)? Yes   Has the patient fallen since discharge? No   Did the patient receive a copy of their discharge instructions? Yes   Nursing interventions Reviewed instructions with patient   What is the patient's perception of their functional status since discharge? Improving   Is the patient able to teach back signs and symptoms of infection? Shortness of breath or chest pain, Severe discomfort or pain, Increased swelling or redness around  incision (not associated with surgical edema), Temp >100.4 for 24h or longer, Incisional drainage, Blisters around incision   If the patient has signs and symptoms of infection, list patient actions taken Knee pain last night   Is the patient able to teach back how to prevent infection? Check incision daily, Wash hands before and after touching incision, Eat well-balanced diet   Is the patient able to teach back signs and symptoms of DVT? Swelling in calf, Severe pain in calf, Redness in calf, Area hot to touch, Shortness of breath or chest pain   Is the patient able to teach back home safety measures? Ability to shower   Did the patient implement home safety suggestions from pre-surgery classes if attended? N/A   If the patient is a current smoker, are they able to teach back resources for cessation? Not a smoker   Is the patient/caregiver able to teach back the hierarchy of who to call/visit for symptoms/problems? PCP, Specialist, Home health nurse, Urgent Care, ED, 911 Yes   Additional teach back comments She is doing well,good appetite, no questions at this time.   Week 2 call completed? Yes   Is the patient interested in additional calls from an ambulatory ?  NOTE:  applies to high risk patients requiring additional follow-up. No   Wrap up additional comments Therapy is going well she says.          Marisela JEAN - Registered Nurse

## 2023-04-14 ENCOUNTER — HOME CARE VISIT (OUTPATIENT)
Dept: HOME HEALTH SERVICES | Facility: HOME HEALTHCARE | Age: 83
End: 2023-04-14
Payer: MEDICARE

## 2023-04-14 LAB
ALBUMIN SERPL-MCNC: 2.6 G/DL (ref 3.5–5.2)
ALBUMIN/GLOB SERPL: 0.8 G/DL
ALP SERPL-CCNC: 437 U/L (ref 39–117)
ALT SERPL W P-5'-P-CCNC: 28 U/L (ref 1–33)
ANION GAP SERPL CALCULATED.3IONS-SCNC: 10 MMOL/L (ref 5–15)
AST SERPL-CCNC: 30 U/L (ref 1–32)
BASOPHILS # BLD AUTO: 0.03 10*3/MM3 (ref 0–0.2)
BASOPHILS NFR BLD AUTO: 0.4 % (ref 0–1.5)
BILIRUB SERPL-MCNC: 2 MG/DL (ref 0–1.2)
BUN SERPL-MCNC: 20 MG/DL (ref 8–23)
BUN/CREAT SERPL: 12.6 (ref 7–25)
CALCIUM SPEC-SCNC: 8 MG/DL (ref 8.6–10.5)
CHLORIDE SERPL-SCNC: 107 MMOL/L (ref 98–107)
CO2 SERPL-SCNC: 20 MMOL/L (ref 22–29)
CREAT SERPL-MCNC: 1.59 MG/DL (ref 0.57–1)
DEPRECATED RDW RBC AUTO: 50.2 FL (ref 37–54)
EGFRCR SERPLBLD CKD-EPI 2021: 32.5 ML/MIN/1.73
EOSINOPHIL # BLD AUTO: 0.09 10*3/MM3 (ref 0–0.4)
EOSINOPHIL NFR BLD AUTO: 1.1 % (ref 0.3–6.2)
ERYTHROCYTE [DISTWIDTH] IN BLOOD BY AUTOMATED COUNT: 15.7 % (ref 12.3–15.4)
GLOBULIN UR ELPH-MCNC: 3.4 GM/DL
GLUCOSE SERPL-MCNC: 168 MG/DL (ref 65–99)
HCT VFR BLD AUTO: 31.2 % (ref 34–46.6)
HCT VFR BLD AUTO: 32 % (ref 34–46.6)
HCT VFR BLD AUTO: 32.1 % (ref 34–46.6)
HGB BLD-MCNC: 10 G/DL (ref 12–15.9)
HGB BLD-MCNC: 10.5 G/DL (ref 12–15.9)
HGB BLD-MCNC: 10.5 G/DL (ref 12–15.9)
HOLD SPECIMEN: NORMAL
IMM GRANULOCYTES # BLD AUTO: 0.03 10*3/MM3 (ref 0–0.05)
IMM GRANULOCYTES NFR BLD AUTO: 0.4 % (ref 0–0.5)
LYMPHOCYTES # BLD AUTO: 1.09 10*3/MM3 (ref 0.7–3.1)
LYMPHOCYTES NFR BLD AUTO: 13.4 % (ref 19.6–45.3)
MCH RBC QN AUTO: 28.8 PG (ref 26.6–33)
MCHC RBC AUTO-ENTMCNC: 32.8 G/DL (ref 31.5–35.7)
MCV RBC AUTO: 87.9 FL (ref 79–97)
MONOCYTES # BLD AUTO: 0.42 10*3/MM3 (ref 0.1–0.9)
MONOCYTES NFR BLD AUTO: 5.1 % (ref 5–12)
NEUTROPHILS NFR BLD AUTO: 6.5 10*3/MM3 (ref 1.7–7)
NEUTROPHILS NFR BLD AUTO: 79.6 % (ref 42.7–76)
NRBC BLD AUTO-RTO: 0 /100 WBC (ref 0–0.2)
PLATELET # BLD AUTO: 126 10*3/MM3 (ref 140–450)
PMV BLD AUTO: 9.8 FL (ref 6–12)
POTASSIUM SERPL-SCNC: 3.3 MMOL/L (ref 3.5–5.2)
POTASSIUM SERPL-SCNC: 4.4 MMOL/L (ref 3.5–5.2)
PROT SERPL-MCNC: 6 G/DL (ref 6–8.5)
RBC # BLD AUTO: 3.64 10*6/MM3 (ref 3.77–5.28)
SODIUM SERPL-SCNC: 137 MMOL/L (ref 136–145)
WBC NRBC COR # BLD: 8.16 10*3/MM3 (ref 3.4–10.8)

## 2023-04-14 PROCEDURE — 85014 HEMATOCRIT: CPT

## 2023-04-14 PROCEDURE — 99024 POSTOP FOLLOW-UP VISIT: CPT | Performed by: ORTHOPAEDIC SURGERY

## 2023-04-14 PROCEDURE — 63710000001 POTASSIUM CHLORIDE 20 MEQ PACK: Performed by: FAMILY MEDICINE

## 2023-04-14 PROCEDURE — 84080 ASSAY ALKALINE PHOSPHATASES: CPT

## 2023-04-14 PROCEDURE — 63710000001 CARVEDILOL 3.125 MG TABLET: Performed by: ORTHOPAEDIC SURGERY

## 2023-04-14 PROCEDURE — 85025 COMPLETE CBC W/AUTO DIFF WBC: CPT

## 2023-04-14 PROCEDURE — 97162 PT EVAL MOD COMPLEX 30 MIN: CPT

## 2023-04-14 PROCEDURE — A9270 NON-COVERED ITEM OR SERVICE: HCPCS | Performed by: FAMILY MEDICINE

## 2023-04-14 PROCEDURE — 85018 HEMOGLOBIN: CPT

## 2023-04-14 PROCEDURE — 85014 HEMATOCRIT: CPT | Performed by: FAMILY MEDICINE

## 2023-04-14 PROCEDURE — 63710000001 POTASSIUM CHLORIDE 10 MEQ CAPSULE CONTROLLED-RELEASE: Performed by: FAMILY MEDICINE

## 2023-04-14 PROCEDURE — 85018 HEMOGLOBIN: CPT | Performed by: FAMILY MEDICINE

## 2023-04-14 PROCEDURE — A9270 NON-COVERED ITEM OR SERVICE: HCPCS | Performed by: ORTHOPAEDIC SURGERY

## 2023-04-14 PROCEDURE — 63710000001 ASPIRIN 81 MG TABLET DELAYED-RELEASE: Performed by: ORTHOPAEDIC SURGERY

## 2023-04-14 PROCEDURE — 63710000001 PANTOPRAZOLE 40 MG TABLET DELAYED-RELEASE: Performed by: ORTHOPAEDIC SURGERY

## 2023-04-14 PROCEDURE — 97166 OT EVAL MOD COMPLEX 45 MIN: CPT

## 2023-04-14 PROCEDURE — 63710000001 LOSARTAN 25 MG TABLET: Performed by: ORTHOPAEDIC SURGERY

## 2023-04-14 PROCEDURE — 94799 UNLISTED PULMONARY SVC/PX: CPT

## 2023-04-14 PROCEDURE — 80053 COMPREHEN METABOLIC PANEL: CPT

## 2023-04-14 PROCEDURE — 63710000001 CHOLECALCIFEROL 25 MCG (1000 UT) TABLET: Performed by: ORTHOPAEDIC SURGERY

## 2023-04-14 PROCEDURE — 63710000001 AMLODIPINE 5 MG TABLET: Performed by: ORTHOPAEDIC SURGERY

## 2023-04-14 PROCEDURE — 84132 ASSAY OF SERUM POTASSIUM: CPT | Performed by: FAMILY MEDICINE

## 2023-04-14 PROCEDURE — G0378 HOSPITAL OBSERVATION PER HR: HCPCS

## 2023-04-14 RX ORDER — MAGNESIUM SULFATE HEPTAHYDRATE 40 MG/ML
2 INJECTION, SOLUTION INTRAVENOUS AS NEEDED
Status: DISCONTINUED | OUTPATIENT
Start: 2023-04-14 | End: 2023-04-15 | Stop reason: HOSPADM

## 2023-04-14 RX ORDER — POTASSIUM CHLORIDE 750 MG/1
40 CAPSULE, EXTENDED RELEASE ORAL AS NEEDED
Status: DISCONTINUED | OUTPATIENT
Start: 2023-04-14 | End: 2023-04-15 | Stop reason: HOSPADM

## 2023-04-14 RX ORDER — MAGNESIUM SULFATE HEPTAHYDRATE 40 MG/ML
4 INJECTION, SOLUTION INTRAVENOUS AS NEEDED
Status: DISCONTINUED | OUTPATIENT
Start: 2023-04-14 | End: 2023-04-15 | Stop reason: HOSPADM

## 2023-04-14 RX ORDER — POTASSIUM CHLORIDE 1.5 G/1.77G
40 POWDER, FOR SOLUTION ORAL AS NEEDED
Status: DISCONTINUED | OUTPATIENT
Start: 2023-04-14 | End: 2023-04-15 | Stop reason: HOSPADM

## 2023-04-14 RX ORDER — ASPIRIN 81 MG/1
81 TABLET ORAL 2 TIMES DAILY
Status: DISCONTINUED | OUTPATIENT
Start: 2023-04-14 | End: 2023-04-15 | Stop reason: HOSPADM

## 2023-04-14 RX ADMIN — CARVEDILOL 3.12 MG: 3.12 TABLET, FILM COATED ORAL at 17:21

## 2023-04-14 RX ADMIN — Medication 10 ML: at 21:37

## 2023-04-14 RX ADMIN — AMLODIPINE BESYLATE 5 MG: 5 TABLET ORAL at 09:05

## 2023-04-14 RX ADMIN — ASPIRIN 81 MG: 81 TABLET, COATED ORAL at 21:34

## 2023-04-14 RX ADMIN — PANTOPRAZOLE SODIUM 40 MG: 40 TABLET, DELAYED RELEASE ORAL at 09:05

## 2023-04-14 RX ADMIN — Medication 10 ML: at 09:05

## 2023-04-14 RX ADMIN — POTASSIUM CHLORIDE 40 MEQ: 750 CAPSULE, EXTENDED RELEASE ORAL at 10:21

## 2023-04-14 RX ADMIN — CARVEDILOL 3.12 MG: 3.12 TABLET, FILM COATED ORAL at 09:05

## 2023-04-14 RX ADMIN — LOSARTAN POTASSIUM 25 MG: 25 TABLET, FILM COATED ORAL at 09:05

## 2023-04-14 RX ADMIN — Medication 1000 UNITS: at 09:05

## 2023-04-14 RX ADMIN — POTASSIUM CHLORIDE 40 MEQ: 1.5 POWDER, FOR SOLUTION ORAL at 15:24

## 2023-04-14 NOTE — ANESTHESIA PROCEDURE NOTES
Airway  Urgency: elective    Date/Time: 4/13/2023 9:53 PM  End Time:4/13/2023 9:53 PM  Airway not difficult    General Information and Staff    Patient location during procedure: OR  CRNA/CAA: Robinson Wolff CRNA  SRNA: Margie Guerin SRNA  Indications and Patient Condition  Indications for airway management: airway protection    Preoxygenated: yes  MILS maintained throughout  Mask difficulty assessment: 0 - not attempted    Final Airway Details  Final airway type: supraglottic airway      Successful airway: I-gel  Size 4     Number of attempts at approach: 1

## 2023-04-14 NOTE — PROGRESS NOTES
ORTHOPEDIC PROGRESS NOTE:    Name:  Rosaura Salgado  Date:    2023  Date of admission:  2023    Post op day:  1 Day Post-Op  Procedure:    Procedure(s) (LRB):  CLOSED REDUCTION right hip (Right)    Subjective:    No new complaints  Pain much improved.  No fever or chills.  Slept well.    Vitals:     Vitals:    23 0612   BP: 168/64   Pulse: 68   Resp: 18   Temp: 97.6 °F (36.4 °C)   SpO2: 93%      Temp (24hrs), Av.9 °F (36.6 °C), Min:97.3 °F (36.3 °C), Max:98.4 °F (36.9 °C)      Exam:    Awake and alert and very pleasant  Toes up and down  Calves soft and nontender  Good distal pulses and sensation    ASSESSMENT:  Active Hospital Problems    Diagnosis  POA   • **Dislocation of right hip, initial encounter [S73.004A]  Yes   • Iron deficiency anemia due to chronic blood loss [D50.0]  Yes   • Anemia [D64.9]  Yes     Added automatically from request for surgery [id number]     • CKD (chronic kidney disease) stage 3, GFR 30-59 ml/min [N18.30]  Yes   • Hypokalemia [E87.6]  Yes   • Hypocalcemia [E83.51]  Yes   • Carotid stenosis, asymptomatic, bilateral [I65.23]  Yes   • Essential hypertension [I10]  Yes   • Coronary atherosclerosis of native coronary artery [I25.10]  Yes         PLAN:    Status post closed reduction of bipolar endoprosthesis dislocation.  Abduction pillow is in place.  Begin PT and OT to restart mobility.  We will continue with use of the abduction pillow while in bed for approximately 6 weeks.  Will discuss with primary service the DVT prophylaxis options for this patient in the face of significant anemia.  Anticipate discharge in 1 to 2 days.    23 at 07:42 CDT by Juan Carlos Jesus MD

## 2023-04-14 NOTE — PROGRESS NOTES
Case Management/Social Work    Patient Name:  Rosaura Salgado  YOB: 1940  MRN: [id number]  Admit Date:  4/13/2023     Met with pt.  She plans to dc to home with her daughter,Radhika. Radhika will be there to assist her mother.   PT states that she has a rolling walker, and commode at home.   She would like [facility] for her home health needs.            Electronically signed by:  Lenore Dobbins RN  04/14/23 13:49 CDT

## 2023-04-14 NOTE — PLAN OF CARE
Goal Outcome Evaluation:               Pt admitted d/t dislocation of right hip. Pt states she was eating good pta. Continues to meet ASPEN criteria for chronic moderate malnutrition r/t physical findings. Pt has increase needs due to pressure injuries. Pt agrees to whole milk TID and chocolate ice cream BID to optimize nutritional intake. Will monitor PO intake. RDN staff will follow hospital course.

## 2023-04-14 NOTE — OP NOTE
HIP BIPOLAR ANTERIOR APPROACH  Procedure Note    Name:    Rosaura Salgado  YOB: 1940  Date of surgery:   4/13/2023 - 4/14/2023    Pre-op Diagnosis:   Dislocation of right hip, initial encounter [S73.004A]  Anemia, unspecified type [D64.9]  Essential hypertension [I10]  Carotid stenosis, asymptomatic, bilateral [I65.23]  Atherosclerosis of native coronary artery of native heart without angina pectoris [I25.10]    Post-op Diagnosis:    Post-Op Diagnosis Codes:     * Dislocation of right hip, initial encounter [S73.004A]     * Anemia, unspecified type [D64.9]     * Essential hypertension [I10]     * Carotid stenosis, asymptomatic, bilateral [I65.23]     * Atherosclerosis of native coronary artery of native heart without angina pectoris [I25.10]    Procedure:  Procedure(s):  CLOSED REDUCTION right hip    Surgeon:  Surgeon(s):  Juan Carlos Jesus MD    Assistant: Analia Jeong CSA was responsible for performing the following activities: manipulation and positioning and their skilled assistance was necessary for the success of this case.     Anesthesia: Anesthesia type not filed in the log.    Staff:   Circulator: Guy Murry RN  Scrub Person: Brendon Mayberry  Assistant: Analia Jeong CSA    Estimated Blood Loss: none    Specimens:                None      Drains:   [REMOVED] External Urinary Catheter (Removed)   Site Assessment Clean;Skin intact 03/30/23 2049   Application/Removal skin care provided 03/30/23 2049   Collection Container Wall suction 03/31/23 0927   Wall suction (mmHG) 125 mmHG 03/31/23 0927   Output (mL) 400 mL 03/31/23 1300       Findings:  As below    Complications: None    IMPLANTS:   Nothing was implanted during the procedure      PROCEDURE:    Once consent was obtained the patient was taken to the operating room and once adequate anesthesia was obtained then the patient was transferred onto the operating table and all bony promises were well-padded.  The  patient was then positioned using the fracture table and a surgical timeout was performed.  The reduction maneuver was then utilized pulling traction on the right lower extremity with gentle manipulation of the leg.  Reduction was achieved without difficulty.  C arm imaging confirmed acceptable position and alignment of the bipolar prosthesis within the right hip joint.  Patient was then repositioned off of the operating table back onto her bed with pillows between her legs.  She was then taken to the recovery room in good condition.  She tolerated the procedure very well.    Juan Carlos Jesus MD     Date: 4/13/2023  Time: 22:16 CDT

## 2023-04-14 NOTE — THERAPY EVALUATION
Patient Name: Rosaura Salgado  : 1940    MRN: [id number]                              Today's Date: 2023       Admit Date: 2023    Visit Dx:     ICD-10-CM ICD-9-CM   1. Dislocation of right hip, initial encounter  S73.004A 835.00   2. Hypokalemia  E87.6 276.8   3. Anemia, unspecified type  D64.9 285.9   4. BOB (acute kidney injury)  N17.9 584.9   5. Essential hypertension  I10 401.9   6. Carotid stenosis, asymptomatic, bilateral  I65.23 433.10     433.30   7. Atherosclerosis of native coronary artery of native heart without angina pectoris  I25.10 414.01   8. Impaired mobility and ADLs  Z74.09 V49.89    Z78.9    9. Impaired functional mobility, balance, gait, and endurance  Z74.09 V49.89     Patient Active Problem List   Diagnosis   • Coronary atherosclerosis of native coronary artery   • Pulmonary embolus   • Pneumonia   • Fibromyalgia   • Mixed hyperlipidemia   • Essential hypertension   • Primary biliary cirrhosis   • Carotid stenosis, asymptomatic, bilateral   • Atherosclerosis of artery of extremity with intermittent claudication   • Traumatic closed displaced fracture of neck of right femur, initial encounter   • Thrombocytopenia   • Closed fracture of neck of right femur, initial encounter   • Moderate malnutrition   • Dislocation of right hip, initial encounter   • Iron deficiency anemia due to chronic blood loss   • Anemia   • CKD (chronic kidney disease) stage 3, GFR 30-59 ml/min   • Hypokalemia   • Hypocalcemia     Past Medical History:   Diagnosis Date   • Allergic rhinitis    • Coronary atherosclerosis of native coronary artery    • Depression    • Fibromyalgia    • GERD (gastroesophageal reflux disease)    • Hyperlipidemia    • Hypertension    • On anticoagulant therapy    • Peptic ulcer    • Peripheral vascular disease, unspecified    • Pneumonia    • Pulmonary embolus      Past Surgical History:   Procedure Laterality Date   • CAROTID ENDARTERECTOMY Left    • COLONOSCOPY N/A  2/1/2019    Procedure: COLONOSCOPY;  Surgeon: Maurice Carmona DO;  Location: [facility] ENDOSCOPY;  Service: Gastroenterology   • ENDOSCOPY N/A 2/1/2019    Procedure: ESOPHAGOGASTRODUODENOSCOPY;  Surgeon: Maurice Carmona DO;  Location: [facility] ENDOSCOPY;  Service: Gastroenterology   • HIP BIPOLAR REPLACEMENT Right 3/28/2023    Procedure: RIGHT HIP BIPOLAR ANTERIOR APPROACH;  Surgeon: Juan Carlos Jesus MD;  Location: [facility] OR;  Service: Orthopedics;  Laterality: Right;   • KNEE SURGERY        General Information     Row Name 04/14/23 1119          OT Time and Intention    Document Type evaluation  -     Mode of Treatment occupational therapy;physical therapy  -     Row Name 04/14/23 1119          General Information    Patient Profile Reviewed yes  -     Prior Level of Function min assist:;gait;transfer;dressing;bathing;independent:;feeding;grooming;dependent:;home management;cooking;cleaning;driving  -     Existing Precautions/Restrictions fall;right;hip, posterior;oxygen therapy device and L/min  No pivoting on right LE.  -     Barriers to Rehab medically complex  -     Row Name 04/14/23 1119          Living Environment    People in Home child(jazmin), adult  Daughter is a RN  -     Row Name 04/14/23 1119          Home Main Entrance    Number of Stairs, Main Entrance two  -SJ     Stair Railings, Main Entrance railings on both sides of stairs  -     Row Name 04/14/23 1119          Stairs Within Home, Primary    Stairs, Within Home, Primary After 3/28/23 surgery, patient went home with home health, still active. Tub/shower, does have a grab bar. Daughter assist with getting into tub, but patient showers standing up. Has a rollator but, does not use. Has a RW that she uses to walk and transfer.  Also has a straight cane. Always does stairs with daughters help. Sleeps in a recliner. Has a BSC at home. Always gets assistance for transfers from daughter; but during fall R knee buckled when patient  got up without assistance.  -     Row Name 04/14/23 1119          Cognition    Orientation Status (Cognition) oriented x 4  -[facility] Name 04/14/23 1119          Safety Issues, Functional Mobility    Safety Issues Affecting Function (Mobility) safety precaution awareness;insight into deficits/self-awareness;awareness of need for assistance  -     Impairments Affecting Function (Mobility) balance;pain  -           User Key  (r) = Recorded By, (t) = Taken By, (c) = Cosigned By    Initials Name Provider Type     Triny Mcdonald OT Occupational Therapist                 Mobility/ADL's     Row Name 04/14/23 1119          Bed Mobility    Bed Mobility supine-sit;sit-supine  -     Supine-Sit [place] (Bed Mobility) minimum assist (75% patient effort)  -     Sit-Supine [place] (Bed Mobility) minimum assist (75% patient effort)  -     Assistive Device (Bed Mobility) bed rails;head of bed elevated  -[facility] Name 04/14/23 1119          Transfers    Transfers sit-stand transfer;toilet transfer  -[facility] Name 04/14/23 1119          Bed-Chair Transfer    Bed-Chair [place] (Transfers) minimum assist (75% patient effort)  -     Assistive Device (Bed-Chair Transfers) walker, front-wheeled  -[facility] Name 04/14/23 1119          Sit-Stand Transfer    Sit-Stand [place] (Transfers) minimum assist (75% patient effort)  -     Assistive Device (Sit-Stand Transfers) walker, front-wheeled  -[facility] Name 04/14/23 1119          Toilet Transfer    Type (Toilet Transfer) sit-stand;stand-sit  -     [place] Level (Toilet Transfer) minimum assist (75% patient effort)  -     Assistive Device (Toilet Transfer) walker, front-wheeled  -     Comment, (Toilet Transfer) Commode over toilet  -     Row Name 04/14/23 1119          Activities of Daily Living    BADL Assessment/Intervention toileting;lower body dressing  -[facility] Name 04/14/23 1119          Toileting Assessment/Training     [place] Level (Toileting) minimum assist (75% patient effort)  -SJ     Row Name 04/14/23 [facility]9          Lower Body Dressing Assessment/Training    [place] Level (Lower Body Dressing) doff;don;socks;dependent (less than 25% patient effort)  -           User Key  (r) = Recorded By, (t) = Taken By, (c) = Cosigned By    Initials Name Provider Type     Triny Mcdonald OT Occupational Therapist               Obj/Interventions     Row Name 04/14/23 [facility]9          Sensory Assessment (Somatosensory)    Sensory Assessment (Somatosensory) UE sensation intact  -[facility] 04/14/23 [facility]9          Range of Motion Comprehensive    General Range of Motion bilateral upper extremity ROM WFL  -SJ     Row Name 04/14/23 [facility]9          Strength Comprehensive (MMT)    General Manual Muscle Testing (MMT) Assessment other (see comments)  -     Comment, General Manual Muscle Testing (MMT) Assessment BUE 4-/5 grossly  -           User Key  (r) = Recorded By, (t) = Taken By, (c) = Cosigned By    Initials Name Provider Type     Triny Mcdonald OT Occupational Therapist               Goals/Plan     Row Name 04/14/23 [facility]9          Transfer Goal 1 (OT)    Activity/Assistive Device (Transfer Goal 1, OT) toilet  -     [place] Level/Cues Needed (Transfer Goal 1, OT) standby assist  -     Time Frame (Transfer Goal 1, OT) long term goal (LTG);by discharge  -     Progress/Outcome (Transfer Goal 1, OT) goal not met  -SJ     Row Name 04/14/23 [facility]9          Bathing Goal 1 (OT)    Activity/Device (Bathing Goal 1, OT) lower body bathing  -     [place] Level/Cues Needed (Bathing Goal 1, OT) standby assist  -     Time Frame (Bathing Goal 1, OT) long term goal (LTG);by discharge  -     Progress/Outcomes (Bathing Goal 1, OT) goal not met  -SJ     Row Name 04/14/23 1119          Dressing Goal 1 (OT)    Activity/Device (Dressing Goal 1, OT) lower body dressing;long-handled shoe horn;reacher;sock-aid  -      [place]/Cues Needed (Dressing Goal 1, OT) standby assist  -SJ     Time Frame (Dressing Goal 1, OT) long term goal (LTG);by discharge  -     Progress/Outcome (Dressing Goal 1, OT) goal not met  -     Row Name 04/14/23 1119          Toileting Goal 1 (OT)    Activity/Device (Toileting Goal 1, OT) toileting skills, all  -     [place] Level/Cues Needed (Toileting Goal 1, OT) standby assist  -SJ     Time Frame (Toileting Goal 1, OT) long term goal (LTG);by discharge  -     Progress/Outcome (Toileting Goal 1, OT) goal not met  -     Row Name 04/14/23 1119          Therapy Assessment/Plan (OT)    Planned Therapy Interventions (OT) activity tolerance training;IADL retraining;adaptive equipment training;BADL retraining;cognitive/visual perception retraining;edema control/reduction;manual therapy/joint mobilization;functional balance retraining;occupation/activity based interventions;ROM/therapeutic exercise;strengthening exercise;transfer/mobility retraining;passive ROM/stretching;patient/caregiver education/training;neuromuscular control/coordination retraining  -           User Key  (r) = Recorded By, (t) = Taken By, (c) = Cosigned By    Initials Name Provider Type     Triny Mcdonald, OT Occupational Therapist               Clinical Impression     Row Name 04/14/23 1119          Pain Assessment    Pretreatment Pain Rating 2/10  -     Posttreatment Pain Rating 0/10 - no pain  -SJ     Pain Location - Side/Orientation Right  -SJ     Pain Location - hip  -SJ     Pain Intervention(s) Ambulation/increased activity;Medication (See MAR);Repositioned  -     Row Name 04/14/23 1119          Plan of Care Review    Outcome Evaluation OT eval complete, co-eval with PT, alert x 4, plesant and agreeable for evaluation. Patient reports she twisted her knee and buckled while trying to get to the commode without daughters help. Patient reports usually her daughter assists for all transfers. 3/28/23 had a post  right hip bipolar anterior approach, after a fall. 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 Per Dr. Jesus, dislocation precautions are in place, and abduction pillow while in bed for approximately 6 weeks (4/14/23 day 1). Supine<>sit with min A. Sit to stand, toliet t/f (BSC over toilet), and functional mobility with min A and RW. LE dressing dependent. Reviewed hip precautions multiple times with patient, however, patient unable to retain precautions. Handout provided and placed on wall for visual reminder and cueing. Returned to bed all needs in reach. Patient with hx of falls, decreased standing balance, hip precautions, and decreased safety with ADLs and transfers. Cont inpatient OT. Would benefit from cont therapy at d/c, as patient will need one person assist for all transfers and to maintain hip precautions.  -     Row Name 04/14/23 1119          Therapy Assessment/Plan (OT)    Patient/Family Therapy Goal Statement (OT) return home  -     Rehab Potential (OT) good, to achieve stated therapy goals  -     Criteria for Skilled Therapeutic Interventions Met (OT) yes;skilled treatment is necessary  -     Therapy Frequency (OT) daily  -     Predicted Duration of Therapy Intervention (OT) until d/c or all goals met  -     Row Name 04/14/23 1119          Therapy Plan Review/Discharge Plan (OT)    Equipment Needs Upon Discharge (OT) tub bench;hip kit  -     Anticipated Discharge Disposition (OT) home with 24/7 care;home with home health;skilled nursing facility  -     Row Name 04/14/23 1119          Vital Signs    Pre Systolic BP Rehab 148  -SJ     Pre Treatment Diastolic BP 67  -SJ     Post Systolic BP Rehab 147  -SJ     Post Treatment Diastolic BP 68  -SJ     Pretreatment Heart Rate (beats/min) 70  -SJ     Posttreatment Heart Rate (beats/min) 74  -SJ     Pre SpO2 (%) 99  -SJ     O2 Delivery Pre Treatment nasal cannula  2 lpm  -     Post SpO2 (%) 95  -SJ      O2 Delivery Post Treatment nasal cannula  -SJ     Pre Patient Position Supine  -SJ     Post Patient Position Supine  -SJ     Row Name 04/14/23 1119          Positioning and Restraints    Pre-Treatment Position in bed  -SJ     Post Treatment Position bed  -SJ     In Bed supine;call light within reach;encouraged to call for assist;exit alarm on;ABD pillow;side rails up x2  -SJ           User Key  (r) = Recorded By, (t) = Taken By, (c) = Cosigned By    Initials Name Provider Type    Triny Yancey, OT Occupational Therapist               Outcome Measures     Row Name 04/14/23 1119          How much help from another is currently needed...    Putting on and taking off regular lower body clothing? 1  -SJ     Bathing (including washing, rinsing, and drying) 2  -SJ     Toileting (which includes using toilet bed pan or urinal) 3  -SJ     Putting on and taking off regular upper body clothing 3  -SJ     Taking care of personal grooming (such as brushing teeth) 3  -SJ     Eating meals 4  -SJ     AM-PAC 6 Clicks Score (OT) 16  -     Row Name 04/14/23 1126 04/14/23 0905       How much help from another person do you currently need...    Turning from your back to your side while in flat bed without using bedrails? 2  -JALEN 1  -CH    Moving from lying on back to sitting on the side of a flat bed without bedrails? 2  -JALEN 1  -CH    Moving to and from a bed to a chair (including a wheelchair)? 3  -JALEN 1  -CH    Standing up from a chair using your arms (e.g., wheelchair, bedside chair)? 3  -JALEN 1  -CH    Climbing 3-5 steps with a railing? 2  -JALEN 1  -CH    To walk in hospital room? 3  -JALEN 1  -CH    AM-PAC 6 Clicks Score (PT) 15  -JALEN 6  -CH    Highest level of mobility 4 --> Transferred to chair/commode  -JALEN 2 --> Bed activities/dependent transfer  -    Row Name 04/14/23 1126 04/14/23 1119       Functional Assessment    Outcome Measure Options AM-PAC 6 Clicks Basic Mobility (PT)  -JALEN AM-PAC 6 Clicks Daily Activity (OT)  -           User Key  (r) = Recorded By, (t) = Taken By, (c) = Cosigned By    Initials Name Provider Type    JALEN Lisseth Browning, PT Physical Therapist    Nalini Ivey, RN Registered Nurse     Triny Mcdonald, OT Occupational Therapist                Occupational Therapy Education     Title: PT OT SLP Therapies (In Progress)     Topic: Occupational Therapy (In Progress)     Point: ADL training (In Progress)     Description:   Instruct learner(s) on proper safety adaptation and remediation techniques during self care or transfers.   Instruct in proper use of assistive devices.              Learning Progress Summary           Patient Acceptance, E,TB, NL,NR by  at 4/14/2023 1313    Comment: POC, role of OT, hip precautions                   Point: Home exercise program (Not Started)     Description:   Instruct learner(s) on appropriate technique for monitoring, assisting and/or progressing therapeutic exercises/activities.              Learner Progress:  Not documented in this visit.          Point: Precautions (Not Started)     Description:   Instruct learner(s) on prescribed precautions during self-care and functional transfers.              Learner Progress:  Not documented in this visit.          Point: Body mechanics (Not Started)     Description:   Instruct learner(s) on proper positioning and spine alignment during self-care, functional mobility activities and/or exercises.              Learner Progress:  Not documented in this visit.                      User Key     Initials Effective Dates Name Provider Type Discipline     06/14/21 -  Triny Mcdonald OT Occupational Therapist OT              OT Recommendation and Plan  Planned Therapy Interventions (OT): activity tolerance training, IADL retraining, adaptive equipment training, BADL retraining, cognitive/visual perception retraining, edema control/reduction, manual therapy/joint mobilization, functional balance retraining, occupation/activity based  interventions, ROM/therapeutic exercise, strengthening exercise, transfer/mobility retraining, passive ROM/stretching, patient/caregiver education/training, neuromuscular control/coordination retraining  Therapy Frequency (OT): daily  Plan of Care Review  Outcome Evaluation: OT eval complete, co-eval with PT, alert x 4, plesant and agreeable for evaluation. Patient reports she twisted her knee and buckled while trying to get to the commode without daughters help. Patient reports usually her daughter assists for all transfers. 3/28/23 had a post right hip bipolar anterior approach, after a fall. 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 Per Dr. Jesus, dislocation precautions are in place, and abduction pillow while in bed for approximately 6 weeks (4/14/23 day 1). Supine<>sit with min A. Sit to stand, toliet t/f (BSC over toilet), and functional mobility with min A and RW. LE dressing dependent. Reviewed hip precautions multiple times with patient, however, patient unable to retain precautions. Handout provided and placed on wall for visual reminder and cueing. Returned to bed all needs in reach. Patient with hx of falls, decreased standing balance, hip precautions, and decreased safety with ADLs and transfers. Cont inpatient OT. Would benefit from cont therapy at d/c, as patient will need one person assist for all transfers and to maintain hip precautions.     Time Calculation:    Time Calculation- OT     Row Name 04/14/23 1316             Time Calculation- OT    OT Start Time 1119  -SJ      OT Stop Time 1215  -SJ      OT Time Calculation (min) 56 min  -SJ      OT Received On 04/14/23  -      OT Goal Re-Cert Due Date 04/27/23  -SJ         Untimed Charges    OT Eval/Re-eval Minutes 56  -SJ         Total Minutes    Untimed Charges Total Minutes 56  -SJ       Total Minutes 56  -SJ            User Key  (r) = Recorded By, (t) = Taken By, (c) = Cosigned By    Initials Name  Provider Type    SJ Triny Mcdonald, [place] Occupational Therapist              Therapy Charges for Today     Code Description Service Date Service Provider Modifiers Qty    75505877017 HC OT EVAL MOD COMPLEXITY 4 4/14/2023 Triny Mcdonald OT GO 1               Triny Mcdonald OT  4/14/2023

## 2023-04-14 NOTE — DISCHARGE PLACEMENT REQUEST
"Rosaura Salgado (82 y.o. Female)     Date of Birth   1940    Social Security Number       Address   [address] [address]    Home Phone   [phone]    MRN   [id number]       Anglican   None    Marital Status                               Admission Date   4/13/23    Admission Type   Emergency    Admitting Provider   Gte Samano MD    Attending Provider   Get Samano MD    Department, Room/Bed   [address], 332/1       Discharge Date       Discharge Disposition       Discharge Destination                               Attending Provider: Get Samano MD    Allergies: No Known Allergies    Isolation: None   Infection: None   Code Status: CPR    Ht: 167.6 cm (66\")   Wt: 58 kg (127 lb 12.8 oz)    Admission Cmt: None   Principal Problem: Dislocation of right hip, initial encounter [S73.004A]                 Active Insurance as of 4/13/2023     Primary Coverage     Payor Plan Insurance Group Employer/Plan Group    HUMANA MEDICARE REPLACEMENT HU[employer]NA MEDICARE REPLACEMENT [id number]     Payor Plan Address Payor Plan Phone Number Payor Plan Fax Number Effective Dates    [address] [address] [phone]  6/1/2018 - None Entered    [facility] [id number]       Subscriber Name Subscriber Birth Date Member ID       WANDERROSAURA ANN 1940 [id number]                 Emergency Contacts      (Rel.) Home Phone Work Phone Mobile Phone    Radhika Mace (Daughter) [phone] -- [phone]            Insurance Information                [employer] MEDICARE REPLACEMENT/HU[employer]NA MEDICARE REPLACEMENT Phone: [phone]    Subscriber: Rosaura Salgado Subscriber#: [id number]    Group#: [id number] Precert#: --          "

## 2023-04-14 NOTE — PLAN OF CARE
Problem: Fall Injury Risk  Goal: Absence of Fall and Fall-Related Injury  Outcome: Ongoing, Progressing  Intervention: Promote Injury-Free Environment  Recent Flowsheet Documentation  Taken 4/13/2023 2130 by Sara Pelletier RN  Safety Promotion/Fall Prevention: patient off unit  Taken 4/13/2023 2052 by Sara Pelletier RN  Safety Promotion/Fall Prevention: safety round/check completed     Problem: Hypertension Comorbidity  Goal: Blood Pressure in Desired Range  Outcome: Ongoing, Progressing     Problem: Electrolyte Imbalance  Goal: Electrolyte Balance  Outcome: Ongoing, Progressing     Problem: Skin Injury Risk Increased  Goal: Skin Health and Integrity  Outcome: Ongoing, Progressing   Goal Outcome Evaluation:

## 2023-04-14 NOTE — PLAN OF CARE
Problem: Adult Inpatient Plan of Care  Goal: Plan of Care Review  Recent Flowsheet Documentation  Taken 4/14/2023 1126 by Lisseth Browning PT  Plan of Care Reviewed With: patient  Outcome Evaluation: PT evaluation complete, co-eval with OT. Patient awake, alert, and agreeable to therapy. Pt reports she twisted her knee and knee buckled while trying to get to the BSC without daughter's help. Pt transferred supine to sit to supine with min assistance and sit to stand to sit with FWW and min assistance. Patient ambulated with fWW 5ftx2 to/from bathroom with verbal and tactile cues for safe use of FWW and to maintain R posterior hip precautions. Function limited by decreased strength, range, balance, and tolerance for functional mobility and activities s/p reduction of bipolar endoprosthesis dislocation. Patient will benefit from PT to improve strength, range, balance, and tolerance to improve bedmobility/transfer and gait ability, and to decrease fall risk. Anticipate continued therapy at discharge with assist with transfers and to maintain hip precautions.   Goal Outcome Evaluation:  Plan of Care Reviewed With: patient           Outcome Evaluation: PT evaluation complete, co-eval with OT. Patient awake, alert, and agreeable to therapy. Pt reports she twisted her knee and knee buckled while trying to get to the BSC without daughter's help. Pt transferred supine to sit to supine with min assistance and sit to stand to sit with FWW and min assistance. Patient ambulated with fWW 5ftx2 to/from bathroom with verbal and tactile cues for safe use of FWW and to maintain R posterior hip precautions. Function limited by decreased strength, range, balance, and tolerance for functional mobility and activities s/p reduction of bipolar endoprosthesis dislocation. Patient will benefit from PT to improve strength, range, balance, and tolerance to improve bedmobility/transfer and gait ability, and to decrease fall risk. Anticipate  continued therapy at discharge with assist with transfers and to maintain hip precautions.

## 2023-04-14 NOTE — PLAN OF CARE
Goal Outcome Evaluation:              Outcome Evaluation: OT eval complete, co-eval with PT, alert x 4, plesant and agreeable for evaluation. Patient reports she twisted her knee and buckled while trying to get to the commode without daughters help. Patient reports usually her daughter assists for all transfers. 3/28/23 had a post right hip bipolar anterior approach, after a fall. 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 Per Dr. Jesus, dislocation precautions are in place, and abduction pillow while in bed for approximately 6 weeks (4/14/23 day 1). Supine<>sit with min A. Sit to stand, toliet t/f (BSC over toilet), and functional mobility with min A and RW. LE dressing dependent. Reviewed hip precautions multiple times with patient, however, patient unable to retain precautions. Handout provided and placed on wall for visual reminder and cueing. Returned to bed all needs in reach. Patient with hx of falls, decreased standing balance, hip precautions, and decreased safety with ADLs and transfers. Cont inpatient OT. Would benefit from cont therapy at d/c, as patient will need one person assist for all transfers and to maintain hip precautions.

## 2023-04-14 NOTE — PLAN OF CARE
Problem: Fall Injury Risk  Goal: Absence of Fall and Fall-Related Injury  Outcome: Ongoing, Progressing  Intervention: Promote Injury-Free Environment  Description: Provide a safe, barrier-free environment that encourages independent activity.  Keep care area uncluttered and well-lighted.  Determine need for increased observation or monitoring.  Avoid use of devices that minimize mobility, such as restraints or indwelling urinary catheter.  Recent Flowsheet Documentation  Taken 4/14/2023 1500 by Nalini Berrios RN  Safety Promotion/Fall Prevention: safety round/check completed  Taken 4/14/2023 1300 by Nalini Berrios RN  Safety Promotion/Fall Prevention: safety round/check completed  Taken 4/14/2023 1100 by Nalini Berrios RN  Safety Promotion/Fall Prevention: safety round/check completed  Taken 4/14/2023 0900 by Nalini Berrios RN  Safety Promotion/Fall Prevention: safety round/check completed  Taken 4/14/2023 0700 by Nalini Berrios RN  Safety Promotion/Fall Prevention: safety round/check completed     Problem: Skin Injury Risk Increased  Goal: Skin Health and Integrity  Outcome: Ongoing, Progressing  Intervention: Optimize Skin Protection  Description: Perform a full pressure injury risk assessment, as indicated by screening, upon admission to care unit.  Reassess skin (injury risk, full inspection) frequently (e.g., scheduled interval, with change in condition) to provide optimal early detection and prevention.  Maintain adequate tissue perfusion (e.g., encourage fluid balance; avoid crossing legs, constrictive clothing or devices) to promote tissue oxygenation.  Maintain head of bed at lowest degree of elevation tolerated, considering medical condition and other restrictions.  Avoid positioning onto an area that remains reddened.  Minimize incontinence and moisture (e.g., toileting schedule; moisture-wicking pad, diaper or incontinence collection device; skin moisture barrier).  Cleanse skin  promptly and gently when soiled utilizing a pH-balanced cleanser.  Relieve and redistribute pressure (e.g., scheduled position changes, weight shifts, use of support surface, medical device repositioning, protective dressing application, use of positioning device, microclimate control, use of pressure-injury-monitor  Encourage increased activity, such as sitting in a chair at the bedside or early mobilization, when able to tolerate.  Recent Flowsheet Documentation  Taken 4/14/2023 0900 by Nalini Berrios RN  Head of Bed (HOB) Positioning: HOB elevated  Taken 4/14/2023 0700 by Nalini Berrios RN  Head of Bed (HOB) Positioning: HOB elevated     Problem: Adult Inpatient Plan of Care  Goal: Optimal Comfort and Wellbeing  Outcome: Ongoing, Progressing     Problem: Adult Inpatient Plan of Care  Goal: Absence of Hospital-Acquired Illness or Injury  Intervention: Identify and Manage Fall Risk  Description: Perform standard risk assessment on admission using a validated tool or comprehensive approach appropriate to the patient; reassess fall risk frequently, with change in status or transfer to another level of care.  Communicate fall injury risk to interprofessional healthcare team.  Determine need for increased observation, equipment and environmental modification, such as low bed, signage and supportive, nonskid footwear.  Adjust safety measures to individual developmental age, stage and identified risk factors.  Reinforce the importance of safety and physical activity with patient and family.  Perform regular intentional rounding to assess need for position change, pain assessment and personal needs, including assistance with toileting.  Recent Flowsheet Documentation  Taken 4/14/2023 1500 by Nalini Berrios RN  Safety Promotion/Fall Prevention: safety round/check completed  Taken 4/14/2023 1300 by Nalini Berrios RN  Safety Promotion/Fall Prevention: safety round/check completed  Taken 4/14/2023 1100 by Agustina  Nalini FLEMING RN  Safety Promotion/Fall Prevention: safety round/check completed  Taken 4/14/2023 0900 by Nalini Berrios RN  Safety Promotion/Fall Prevention: safety round/check completed  Taken 4/14/2023 0700 by Nalini Berrios RN  Safety Promotion/Fall Prevention: safety round/check completed  Intervention: Prevent Skin Injury  Description: Perform a screening for skin injury risk, such as pressure or moisture associated skin damage on admission and at regular intervals throughout hospital stay.  Keep all areas of skin (especially folds) clean and dry.  Maintain adequate skin hydration.  Relieve and redistribute pressure and protect bony prominences; implement measures based on patient-specific risk factors.  Match turning and repositioning schedule to clinical condition.  Encourage weight shift frequently; assist with reposition if unable to complete independently.  Float heels off bed; avoid pressure on the Achilles tendon.  Keep skin free from extended contact with medical devices.  Encourage functional activity and mobility, as early as tolerated.  Use aids (e.g., slide boards, mechanical lift) during transfer.  Recent Flowsheet Documentation  Taken 4/14/2023 1500 by Nalini Berrios RN  Body Position: sitting up in bed  Taken 4/14/2023 1300 by Nalini Berrios RN  Body Position: patient/family refused  Taken 4/14/2023 1100 by Nalini Berrios RN  Body Position: patient/family refused  Taken 4/14/2023 0900 by Nalini Berrios RN  Body Position:   left   turned  Taken 4/14/2023 0700 by Nalini Berrios RN  Body Position:   turned   right  Intervention: Prevent and Manage VTE (Venous Thromboembolism) Risk  Description: Assess for VTE (venous thromboembolism) risk.  Encourage and assist with early ambulation.  Initiate and maintain compression or other therapy, as indicated, based on identified risk in accordance with organizational protocol and provider order.  Encourage both active and passive leg  exercises while in bed, if unable to ambulate.  Recent Flowsheet Documentation  Taken 4/14/2023 1500 by Nalini Berrios, RN  Activity Management: activity encouraged  Taken 4/14/2023 1300 by Nalini Berrios, RN  Activity Management: activity encouraged  Taken 4/14/2023 1100 by Nalini Berrios, RN  Activity Management: activity encouraged  Taken 4/14/2023 0905 by Nalini Berrios, RN  VTE Prevention/Management: (see MAR) other (see comments)  Taken 4/14/2023 0900 by Nalini Berrios, RN  Activity Management: bedrest  Taken 4/14/2023 0700 by Nalini Berrios, RN  Activity Management: bedrest   Goal Outcome Evaluation:

## 2023-04-14 NOTE — OUTREACH NOTE
Total Joint Month 1 Survey    Flowsheet Row Responses   [facility] facility patient discharged from? [facility]   Does the patient have one of the following disease processes/diagnoses(primary or secondary)? Total Joint Replacement   Joint surgery performed? Hip   Month 1 attempt successful? No   Unsuccessful attempts Attempt 1   Revoke Readmitted          MUNA BUTLER - Registered Nurse

## 2023-04-14 NOTE — PROGRESS NOTES
[facility] Medicine Services  INPATIENT PROGRESS NOTE      Length of Stay: 0  Date of Admission: 4/13/2023  Primary Care Physician: Jin Dumont MD    Subjective   Chief Complaint: No new complaints  HPI: Patient denies any new complaints.  She is wondering when she will be able to get out of the hospital.  Hemoglobin improved this morning to 10.5 posttransfusion.    Review of Systems   All pertinent negatives and positives are as above. All other systems have been reviewed and are negative unless otherwise stated.     Objective    As of today 04/14/23  Temp:  [97.3 °F (36.3 °C)-98.6 °F (37 °C)] 98.6 °F (37 °C)  Heart Rate:  [55-85] 72  Resp:  [14-20] 18  BP: (120-189)/(51-78) 148/67    Physical Exam  Constitutional:       General: She is not in acute distress.     Appearance: She is not toxic-appearing.   HENT:      Head: Normocephalic and atraumatic.      Right Ear: External ear normal.      Left Ear: External ear normal.      Nose: Nose normal.      Mouth/Throat:      Pharynx: Oropharynx is clear.   Eyes:      Conjunctiva/sclera: Conjunctivae normal.   Cardiovascular:      Rate and Rhythm: Normal rate and regular rhythm.      Heart sounds: Normal heart sounds.   Pulmonary:      Effort: Pulmonary effort is normal. No respiratory distress.      Breath sounds: Normal breath sounds.   Abdominal:      General: Bowel sounds are normal.      Palpations: Abdomen is soft.      Tenderness: There is no abdominal tenderness.   Musculoskeletal:         General: No deformity.   Skin:     General: Skin is warm and dry.      Capillary Refill: Capillary refill takes less than 2 seconds.   Neurological:      General: No focal deficit present.      Mental Status: She is alert and oriented to person, place, and time. Mental status is at baseline.   Psychiatric:         Behavior: Behavior normal.           Results Review:  I have reviewed the labs, radiology results,  and diagnostic studies.    Laboratory Data:   Results from last 7 days   Lab Units 04/14/23  0816 04/13/23  1933 04/13/23  1802   SODIUM mmol/L 137  --  138   SODIUM, VENOUS mmol/L  --  138  --    POTASSIUM mmol/L 3.3*  --  3.0*   POTASSIUM, VENOUS mmol/L  --  3.0*  --    CHLORIDE mmol/L 107  --  107   CO2 mmol/L 20.0*  --  21.0*   BUN mg/dL 20  --  18   CREATININE mg/dL 1.59*  --  1.67*   GLUCOSE mg/dL 168*  --  148*   CALCIUM mg/dL 8.0*  --  7.6*   BILIRUBIN mg/dL 2.0*  --  0.6   ALK PHOS U/L 437*  --  393*   ALT (SGPT) U/L 28  --  27   AST (SGOT) U/L 30  --  35*   ANION GAP mmol/L 10.0  --  10.0     Estimated Creatinine Clearance: 25.4 mL/min (A) (by C-G formula based on SCr of 1.59 mg/dL (H)).  Results from last 7 days   Lab Units 04/13/23  1802   MAGNESIUM mg/dL 1.8         Results from last 7 days   Lab Units 04/14/23  1040 04/14/23  0816 04/13/23  1857   WBC 10*3/mm3  --  8.16 4.25   HEMOGLOBIN g/dL 10.5* 10.5* 5.8*   HEMATOCRIT % 32.1* 32.0* 18.9*   PLATELETS 10*3/mm3  --  126* 99*           Culture Data:   No results found for: BLOODCX  No results found for: URINECX  No results found for: RESPCX  No results found for: WOUNDCX  No results found for: STOOLCX  No components found for: BODYFLD    Radiology Data:   Imaging Results (Last 24 Hours)     Procedure Component Value Units Date/Time    FL C Arm During Surgery [[id number]] Resulted: 04/14/23 0726     Updated: 04/14/23 0726    XR Hip With or Without Pelvis 2 - 3 View Right [[id number]] Collected: 04/13/23 1847     Updated: 04/13/23 1946    Narrative:      XR HIP RIGHT W-OR-WO PELVIS 2-3 VIEWS    HISTORY:  Hip dislocation.    FINDINGS:  Right hip hemiarthroplasty which is dislocated and proximally retracted by  approximately 8 cm.  Nonobstructive bowel gas pattern partially obscuring the  sacrum. No clear acute fracture periprosthetic or otherwise.        XR Femur 2 View Right [[id number]] Collected: 04/13/23 1847     Updated: 04/13/23 1946    Narrative:       XR FEMUR 2 VW RIGHT    HISTORY:  Hip pain.    FINDINGS:  No acute osseous fracture.  Soft tissues are grossly unremarkable.  Right hip  dislocation better delineated on dedicated hip radiograph.        XR Chest 1 View [[id number]] Collected: 04/13/23 1847     Updated: 04/13/23 1905    Narrative:      FINDINGS:  Low lung volumes. There is slight elevation of the left hemidiaphragm with left  basilar atelectasis versus infiltrate.   There is mild vascular congestion.      Impression:        1.  Slight elevation of the left hemidiaphragm.  Mild left basilar atelectasis  versus infiltrate.    2.  The heart size is normal.  There is slight central vascular congestion.          I have reviewed the patient's current medications.     Assessment/Plan     Principal Problem:    Dislocation of right hip, initial encounter  Active Problems:    Coronary atherosclerosis of native coronary artery    Essential hypertension    Carotid stenosis, asymptomatic, bilateral    Iron deficiency anemia due to chronic blood loss    Anemia    CKD (chronic kidney disease) stage 3, GFR 30-59 ml/min    Hypokalemia    Hypocalcemia  Primary biliary cirrhosis    Plan:  - Continue to monitor H&H for any signs of dropping, presently there does not appear to be any signs of active bleeding  - Risk versus benefits of blood transfusion have been previously discussed.  - Hold off on any further anticoagulation  - Discussed with orthopedic surgery and we will place her on baby aspirin 81 mg twice daily moving forward.  Appreciate orthopedic surgery's help.  - Discussed with patient's gastroenterologist and we will arrange for close outpatient follow-up in the next 1 to 2 weeks as given her advanced age we will attempt to manage with conservative therapy and lieu of the risk of placing the patient under anesthesia again for further procedures.  - Continue home medications as appropriate  - PT and OT have been ordered  - DVT prophylaxis with SCDs  - CODE  STATUS: Full    Medical Decision Making  Number and Complexity of problems: 4 high complexity problems    Conditions and Status:        Condition is improving.     MDM Data  External documents reviewed: Previous records  Tests considered but not ordered: None     Decision rules/scores evaluated (example [id number], [place], etc): None     Discussed with: None     Treatment Plan  As above    Care Planning  Shared decision making: Patient  Code status and discussions: Full    Disposition  Social Determinants of Health that impact treatment or disposition: none  I expect the patient to be discharged to home w/ HH in 1-2 days.       I have utilized all available immediate resources to obtain, update, or review the patient's current medications (including all prescriptions, over-the-counter products, herbals, cannabis/cannabidiol products, and vitamin/mineral/dietary (nutritional) supplements).   I confirmed that the patient's Advance Care Plan is present, code status is documented, or surrogate decision maker is listed in the patient's medical record.      Marty Gloria MD

## 2023-04-14 NOTE — ANESTHESIA POSTPROCEDURE EVALUATION
Patient: Rosaura Salgado    Procedure Summary     Date: 04/13/23 Room / Location: [facility] OR  / [facility] OR    Anesthesia Start: 2147 Anesthesia Stop: 2219    Procedure: CLOSED REDUCTION right hip (Right: Hip) Diagnosis:       Dislocation of right hip, initial encounter      Anemia, unspecified type      Essential hypertension      Carotid stenosis, asymptomatic, bilateral      Atherosclerosis of native coronary artery of native heart without angina pectoris      (Dislocation of right hip, initial encounter [S73.004A])      (Anemia, unspecified type [D64.9])      (Essential hypertension [I10])      (Carotid stenosis, asymptomatic, bilateral [I65.23])      (Atherosclerosis of native coronary artery of native heart without angina pectoris [I25.10])    Surgeons: Juan Carlos Jesus MD Provider: Robinson Wolff CRNA    Anesthesia Type: Not recorded ASA Status: 4 - Emergent          Anesthesia Type: No value filed.    Vitals  No vitals data found for the desired time range.          Post Anesthesia Care and Evaluation    Patient location during evaluation: PACU  Patient participation: complete - patient participated  Level of consciousness: awake and alert  Pain score: 0  Pain management: adequate    Airway patency: patent  Anesthetic complications: No anesthetic complications    Cardiovascular status: acceptable  Respiratory status: acceptable and nasal cannula  Hydration status: acceptable    Comments: ---------------------------               04/13/23 2217         ---------------------------   BP:          157/74         Pulse:         61           Resp:          16           Temp:   97.5 °F   SpO2:          92%         ---------------------------

## 2023-04-14 NOTE — ANESTHESIA PREPROCEDURE EVALUATION
Anesthesia Evaluation     Patient summary reviewed and Nursing notes reviewed   no history of anesthetic complications:  NPO Solid Status: > 8 hours  NPO Liquid Status: > 2 hours           Airway   Mallampati: II  TM distance: >3 FB  Neck ROM: full  possible difficult intubation  Dental    (+) poor dentation and upper dentures    Pulmonary    (+) pulmonary embolism, a smoker Former, COPD moderate, decreased breath sounds,   (-) shortness of breath    PE comment: Albuterol treatment ordered pre-op.  Cardiovascular     ECG reviewed  Rhythm: regular  Rate: normal    (+) hypertension, CAD, cardiac stents (Coronary one stent 2014. Reports no problems since.) PVD, DVT, hyperlipidemia,  carotid artery disease carotid bilateral  (-) angina, MIRELES, murmur, carotid bruits    ROS comment: n-specific st t changes  Abnormal ECG  When compared with ECG of 13-DEC-2018 18:43,  Vent. rate has decreased BY  31 BPM  Nonspecific T wave abnormality, improved in Anterior leads     Referred By:            Confirmed By: GOGO HAAS    Neuro/Psych  (+) psychiatric history Depression,    GI/Hepatic/Renal/Endo    (+)  GERD,      Musculoskeletal     Abdominal    Substance History - negative use     OB/GYN negative ob/gyn ROS         Other   arthritis,      ROS/Med Hx Other: Venous blood gas Hgb 6.3  K+ 3.0                    Anesthesia Plan    ASA 4 - emergent       total IV anesthesia  intravenous induction     Anesthetic plan, risks, benefits, and alternatives have been provided, discussed and informed consent has been obtained with: patient.  Pre-procedure education provided  Use of blood products discussed with patient  Consented to blood products.   Plan discussed with CRNA.        CODE STATUS:        done

## 2023-04-14 NOTE — THERAPY EVALUATION
Patient Name: Rosaura Salgado  : 1940    MRN: [id number]                              Today's Date: 2023       Admit Date: 2023    Visit Dx:     ICD-10-CM ICD-9-CM   1. Dislocation of right hip, initial encounter  S73.004A 835.00   2. Hypokalemia  E87.6 276.8   3. Anemia, unspecified type  D64.9 285.9   4. BOB (acute kidney injury)  N17.9 584.9   5. Essential hypertension  I10 401.9   6. Carotid stenosis, asymptomatic, bilateral  I65.23 433.10     433.30   7. Atherosclerosis of native coronary artery of native heart without angina pectoris  I25.10 414.01   8. Impaired mobility and ADLs  Z74.09 V49.89    Z78.9    9. Impaired functional mobility, balance, gait, and endurance  Z74.09 V49.89     Patient Active Problem List   Diagnosis   • Coronary atherosclerosis of native coronary artery   • Pulmonary embolus   • Pneumonia   • Fibromyalgia   • Mixed hyperlipidemia   • Essential hypertension   • Primary biliary cirrhosis   • Carotid stenosis, asymptomatic, bilateral   • Atherosclerosis of artery of extremity with intermittent claudication   • Traumatic closed displaced fracture of neck of right femur, initial encounter   • Thrombocytopenia   • Closed fracture of neck of right femur, initial encounter   • Moderate malnutrition   • Dislocation of right hip, initial encounter   • Iron deficiency anemia due to chronic blood loss   • Anemia   • CKD (chronic kidney disease) stage 3, GFR 30-59 ml/min   • Hypokalemia   • Hypocalcemia     Past Medical History:   Diagnosis Date   • Allergic rhinitis    • Coronary atherosclerosis of native coronary artery    • Depression    • Fibromyalgia    • GERD (gastroesophageal reflux disease)    • Hyperlipidemia    • Hypertension    • On anticoagulant therapy    • Peptic ulcer    • Peripheral vascular disease, unspecified    • Pneumonia    • Pulmonary embolus      Past Surgical History:   Procedure Laterality Date   • CAROTID ENDARTERECTOMY Left    • COLONOSCOPY N/A  2/1/2019    Procedure: COLONOSCOPY;  Surgeon: Maurice Carmona DO;  Location: [facility] ENDOSCOPY;  Service: Gastroenterology   • ENDOSCOPY N/A 2/1/2019    Procedure: ESOPHAGOGASTRODUODENOSCOPY;  Surgeon: Maurice Carmona DO;  Location: [facility] ENDOSCOPY;  Service: Gastroenterology   • HIP BIPOLAR REPLACEMENT Right 3/28/2023    Procedure: RIGHT HIP BIPOLAR ANTERIOR APPROACH;  Surgeon: Juan Carlos Jesus MD;  Location: [facility] OR;  Service: Orthopedics;  Laterality: Right;   • KNEE SURGERY        General Information     Row Name 04/14/23 1126          Physical Therapy Time and Intention    Document Type evaluation  -     Mode of Treatment occupational therapy;physical therapy  -     Row Name 04/14/23 1126          General Information    Patient Profile Reviewed yes  -JALEN     Existing Precautions/Restrictions fall;right;hip, posterior;oxygen therapy device and L/min  -     Barriers to Rehab medically complex  -     Row Name 04/14/23 1126          Living Environment    People in Home child(jazmin), adult  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 Was using RW for transfers and ambulation. Pt was sleeping in a recliner;has BSC  -JALEN     Number of Stairs, Within Home, Primary none  -JALEN     Row Name 04/14/23 1126          Cognition    Orientation Status (Cognition) oriented x 4  -JALEN     Row Name 04/14/23 1126          Safety Issues, Functional Mobility    Safety Issues Affecting Function (Mobility) insight into deficits/self-awareness;safety precaution awareness;awareness of need for assistance  -JALEN     Impairments Affecting Function (Mobility) balance;endurance/activity  tolerance;strength  -           User Key  (r) = Recorded By, (t) = Taken By, (c) = Cosigned By    Initials Name Provider Type    Lisseth Strong PT Physical Therapist               Mobility     Row Name 04/14/23 1126          Bed Mobility    Bed Mobility supine-sit;sit-supine  -     Supine-Sit [place] (Bed Mobility) minimum assist (75% patient effort)  -     Sit-Supine [place] (Bed Mobility) minimum assist (75% patient effort)  -     Assistive Device (Bed Mobility) bed rails;head of bed elevated  -     Row Name 04/14/23 1126          Bed-Chair Transfer    Bed-Chair [place] (Transfers) minimum assist (75% patient effort)  -     Assistive Device (Bed-Chair Transfers) walker, front-wheeled  -     Row Name 04/14/23 1126          Sit-Stand Transfer    Sit-Stand [place] (Transfers) minimum assist (75% patient effort)  -     Assistive Device (Sit-Stand Transfers) walker, front-wheeled  -     Row Name 04/14/23 1126          Gait/Stairs (Locomotion)    [place] Level (Gait) minimum assist (75% patient effort);verbal cues;nonverbal cues (demo/gesture)  -     Assistive Device (Gait) walker, front-wheeled  -     Distance in Feet (Gait) 5ftx2 to/from bathroom  -           User Key  (r) = Recorded By, (t) = Taken By, (c) = Cosigned By    Initials Name Provider Type    Lisseth Strong PT Physical Therapist               Obj/Interventions     Row Name 04/14/23 1126          Range of Motion Comprehensive    Comment, General Range of Motion BLE: RLE: AROM WFL ankle/foot, AAROM WFL within hip precaution limits knee, hipLLE: AROM WFL  -     Row Name 04/14/23 1126          Strength Comprehensive (MMT)    Comment, General Manual Muscle Testing (MMT) Assessment BLE: RLE: 3+/5 ankle/foot, 2/5 knee, hip; LLE: 4-/5 grossly  -[facility] Name 04/14/23 1126          Balance    Balance Assessment sitting static balance;sit to stand dynamic balance;sitting dynamic balance;standing static  balance;standing dynamic balance  -     Static Sitting Balance standby assist  -     Dynamic Sitting Balance standby assist  -     Position, Sitting Balance sitting edge of bed  -     Sit to Stand Dynamic Balance minimal assist;verbal cues;non-verbal cues (demo/gesture)  -     Static Standing Balance minimal assist  -     Dynamic Standing Balance minimal assist  -     Position/Device Used, Standing Balance walker, front-wheeled  -     Row Name 04/14/23 1126          Sensory Assessment (Somatosensory)    Sensory Assessment (Somatosensory) LE sensation intact  -           User Key  (r) = Recorded By, (t) = Taken By, (c) = Cosigned By    Initials Name Provider Type    JALEN Lisseth Browning, PT Physical Therapist               Goals/Plan     Row Name 04/14/23 1126          Bed Mobility Goal 1 (PT)    Activity/Assistive Device (Bed Mobility Goal 1, PT) bed mobility activities, all  -     [place] Level/Cues Needed (Bed Mobility Goal 1, PT) contact guard required;standby assist  -     Time Frame (Bed Mobility Goal 1, PT) by discharge  -JALEN     Strategies/Barriers (Bed Mobility Goal 1, PT) maintain R hip precautions  -     Row Name 04/14/23 1126          Transfer Goal 1 (PT)    Activity/Assistive Device (Transfer Goal 1, PT) sit-to-stand/stand-to-sit;bed-to-chair/chair-to-bed  -     [place] Level/Cues Needed (Transfer Goal 1, PT) contact guard required;standby assist;modified independence  -     Time Frame (Transfer Goal 1, PT) by discharge  -     Strategies/Barriers (Transfers Goal 1, PT) will maintain R hip dislocation precautions  -     Progress/Outcome (Transfer Goal 1, PT) goal not met  -     Row Name 04/14/23 1126          Gait Training Goal 1 (PT)    Activity/Assistive Device (Gait Training Goal 1, PT) gait (walking locomotion);assistive device use;decrease fall risk;increase endurance/gait distance  -     [place] Level (Gait Training Goal 1, PT) standby assist;modified  independence  -JALEN     Distance (Gait Training Goal 1, PT) 150ft  -JALEN     Time Frame (Gait Training Goal 1, PT) 5 days  -JALEN     Strategies/Barriers (Gait Training Goal 1, PT) will maintain R hip dislocation precautions  -     Row Name 04/14/23 1126          Stairs Goal 1 (PT)    Activity/Assistive Device (Stairs Goal 1, PT) ascending stairs;descending stairs;using handrail, left;using handrail, right  -JALEN     [place] Level/Cues Needed (Stairs Goal 1, PT) standby assist;modified independence  -JALEN     Number of Stairs (Stairs Goal 1, PT) 2  -JALEN     Time Frame (Stairs Goal 1, PT) by discharge  -JALEN     Progress/Outcome (Stairs Goal 1, PT) goal not met  -     Row Name 04/14/23 1126          Therapy Assessment/Plan (PT)    Planned Therapy Interventions (PT) balance training;bed mobility training;gait training;patient/family education;home exercise program;ROM (range of motion);stair training;strengthening;transfer training  -           User Key  (r) = Recorded By, (t) = Taken By, (c) = Cosigned By    Initials Name Provider Type    JALEN Lisseth Browning, PT Physical Therapist               Clinical Impression     Row Name 04/14/23 1126          Pain    Pretreatment Pain Rating 2/10  -JALEN     Posttreatment Pain Rating 0/10 - no pain  -JALEN     Pain Location - Side/Orientation Right  -JALEN     Pain Location - hip  -JALEN     Additional Documentation Pain Scale: Numbers Pre/Post-Treatment (Group)  -     Row Name 04/14/23 1126          Plan of Care Review    Plan of Care Reviewed With patient  -JALEN     Outcome Evaluation PT evaluation complete, co-eval with OT. Patient awake, alert, and agreeable to therapy. Pt reports she twisted her knee and knee buckled while trying to get [place] [facility] [place]'s [place]. Pt transferred supine to sit to supine with min assistance and sit to stand to sit with FWW and min assistance. Patient ambulated with fWW 5ftx2 to/from bathroom with verbal and tactile cues for safe use of FWW and to  maintain R posterior hip precautions. Function limited by decreased strength, range, balance, and tolerance for functional mobility and activities s/p reduction of bipolar endoprosthesis dislocation. Patient will benefit from PT to improve strength, range, balance, and tolerance to improve bedmobility/transfer and gait ability, and to decrease fall risk. Anticipate continued therapy at discharge with assist with transfers and to maintain hip precautions.  -     Row Name 04/14/23 1126          Therapy Assessment/Plan (PT)    Patient/Family Therapy Goals Statement (PT) be stronger; improve mobility  -     Rehab Potential (PT) good, to achieve stated therapy goals  -     Criteria for Skilled Interventions Met (PT) meets criteria;yes;skilled treatment is necessary  -     Therapy Frequency (PT) daily  x2  -JALEN     Predicted Duration of Therapy Intervention (PT) until discharge or goals met  -     Row Name 04/14/23 1126          Vital Signs    Pre Systolic BP Rehab 148  -JALEN     Pre Treatment Diastolic BP 67  -JALEN     Post Systolic BP Rehab 147  -JALEN     Post Treatment Diastolic BP 68  -JALEN     Pretreatment Heart Rate (beats/min) 70  -JALEN     Posttreatment Heart Rate (beats/min) 74  -JALEN     Pre SpO2 (%) 99  -JALEN     O2 Delivery Pre Treatment nasal cannula  2lpm  -JALEN     Post SpO2 (%) 95  -JALEN     O2 Delivery Post Treatment nasal cannula  -JALEN     Pre Patient Position Supine  -JALEN     Post Patient Position Supine  -     Row Name 04/14/23 1126          Positioning and Restraints    Pre-Treatment Position in bed  -JALEN     Post Treatment Position bed  -JALEN     In Bed supine;call light within reach;encouraged to call for assist;exit alarm on;ABD pillow;side rails up x2  -JALEN           User Key  (r) = Recorded By, (t) = Taken By, (c) = Cosigned By    Initials Name Provider Type    Lisseth Strong, PT Physical Therapist               Outcome Measures     Row Name 04/14/23 1126 04/14/23 0905       How much help from another  person do you currently need...    Turning from your back to your side while in flat bed without using bedrails? 2  -JALEN 1  -CH    Moving from lying on back to sitting on the side of a flat bed without bedrails? 2  -JALEN 1  -CH    Moving to and from a bed to a chair (including a wheelchair)? 3  -JALEN 1  -CH    Standing up from a chair using your arms (e.g., wheelchair, bedside chair)? 3  -JALEN 1  -CH    Climbing 3-5 steps with a railing? 2  -JALEN 1  -CH    To walk in hospital room? 3  -JALEN 1  -CH    AM-PAC 6 Clicks Score (PT) 15  - 6  -    Highest level of mobility 4 --> Transferred to chair/commode  - 2 --> Bed activities/dependent transfer  -    Row Name 04/14/23 1126 04/14/23 1119       Functional Assessment    Outcome Measure Options AM-PAC 6 Clicks Basic Mobility (PT)  - AM-PAC 6 Clicks Daily Activity (OT)  -          User Key  (r) = Recorded By, (t) = Taken By, (c) = Cosigned By    Initials Name Provider Type     Lisseth Browning, PT Physical Therapist    Nalini Ivey, RN Registered Nurse     Tirny Mcdonald, OT Occupational Therapist                             Physical Therapy Education     Title: PT OT SLP Therapies (In Progress)     Topic: Physical Therapy (In Progress)     Point: Mobility training (In Progress)     Learning Progress Summary           Patient Acceptance, E, NR by  at 4/14/2023 1421                   Point: Home exercise program (Not Started)     Learner Progress:  Not documented in this visit.          Point: Body mechanics (In Progress)     Learning Progress Summary           Patient Acceptance, E, NR by  at 4/14/2023 1421                   Point: Precautions (In Progress)     Learning Progress Summary           Patient Acceptance, E, NR by  at 4/14/2023 1421                               User Key     Initials Effective Dates Name Provider Type Discipline     06/16/21 -  Lisseth Browning, PT Physical Therapist PT              PT Recommendation and Plan  Planned Therapy  Interventions (PT): balance training, bed mobility training, gait training, patient/family education, home exercise program, ROM (range of motion), stair training, strengthening, transfer training  Plan of Care Reviewed With: patient  Outcome Evaluation: PT evaluation complete, co-eval with OT. Patient awake, alert, and agreeable to therapy. Pt reports she twisted her knee and knee buckled [place] [facility] [place]. Pt transferred supine to sit to supine with min assistance and sit to stand to sit with FWW and min assistance. Patient ambulated with fWW 5ftx2 to/from bathroom with verbal and tactile cues for safe use of FWW and to maintain R posterior hip precautions. Function limited by decreased strength, range, balance, and tolerance for functional mobility and activities s/p reduction of bipolar endoprosthesis dislocation. Patient will benefit from PT to improve strength, range, balance, and tolerance to improve bedmobility/transfer and gait ability, and to decrease fall risk. Anticipate continued therapy at discharge with assist with transfers and to maintain hip precautions.     Time Calculation:    PT Charges     Row Name 04/14/23 1422             Time Calculation    Start Time 1126  -JALEN      Stop Time 1219  -JALEN      Time Calculation (min) 53 min  -JALEN      PT Received On 04/14/23  -      PT Goal Re-Cert Due Date 04/27/23  -         Time Calculation- PT    Total Timed Code Minutes- PT 0 minute(s)  -JALEN         Untimed Charges    PT Eval/Re-eval Minutes 53  -JALEN         Total Minutes    Untimed Charges Total Minutes 53  -JALEN       Total Minutes 53  -JALEN            User Key  (r) = Recorded By, (t) = Taken By, (c) = Cosigned By    Initials Name Provider Type    Lisseth Strong, PT Physical Therapist              Therapy Charges for Today     Code Description Service Date Service Provider Modifiers Qty    [id number] HC PT EVAL MOD COMPLEXITY 4 4/14/2023 Lisseth Browning, PT GP 1           PT G-Codes  Outcome Measure Options: AM-PAC 6 Clicks Basic Mobility (PT)  AM-PAC 6 Clicks Score (PT): 15  AM-PAC 6 Clicks Score (OT): 16  PT Discharge Summary  Anticipated Discharge Disposition (PT): home with assist, home with home health, skilled nursing facility    Lisseth Browning, PT  4/14/2023

## 2023-04-14 NOTE — H&P
[facility] Medicine Admission      Date of Admission: 4/13/2023      Primary Care Physician: Jin Dumont MD    Chief Complaint:   Right hip pain    HPI:  Patient was doing exercises at home yesterday when she fell and immediately had right hip pain.  The patient had her leg in a flexed and externally rotated position and on x-rays performed in the emergency department she is been found to have a right hip dislocation.  Patient recently had a number of falls either due to tripping or her right knee giving way.  She finds mobility aids are not always as helpful as she hopes for.  Patient had no cardiorespiratory or neurological deficits/symptoms either prior or subsequent to the fall.    Concurrent Medical History:  has a past medical history of Allergic rhinitis, Coronary atherosclerosis of native coronary artery, Depression, Fibromyalgia, GERD (gastroesophageal reflux disease), Hyperlipidemia, Hypertension, On anticoagulant therapy, Peptic ulcer, Peripheral vascular disease, unspecified, Pneumonia, and Pulmonary embolus.    Past Surgical History:  has a past surgical history that includes Knee surgery; Carotid endarterectomy (Left); Esophagogastroduodenoscopy (N/A, 2/1/2019); Colonoscopy (N/A, 2/1/2019); and Hip Bipolar (Right, 3/28/2023).    Family History: family history includes COPD in her mother; Cancer in an other family member; Other in her father; Thyroid disease in an other family member.     Social History:  reports that she has quit smoking. She has never used smokeless tobacco. She reports that she does not drink alcohol and does not use drugs.    Allergies: No Known Allergies    Medications:   Prior to Admission medications    Medication Sig Start Date End Date Taking? Authorizing Provider   amLODIPine (NORVASC) 5 MG tablet Take 1 tablet by mouth Daily for 30 days. 4/1/23 5/1/23  Marty Gloria MD   aspirin 81 MG EC tablet Take 1 tablet by mouth  Daily.    Shweta Marr MD   carvedilol (COREG) 6.25 MG tablet  1/11/23   Shweta Marr MD   Cholecalciferol (VITAMIN D PO) Take 1,000 Units by mouth Daily. Indications: VITAMIN D DEFICIENCY    Shweta Marr MD   Enoxaparin Sodium (LOVENOX) 30 MG/0.3ML solution prefilled syringe syringe Inject 0.3 mL (1 syringe) under the skin into the appropriate area as directed Daily for 28 days. 3/31/23 4/28/23  Marty Gloria MD   HYDROcodone-acetaminophen (NORCO) 7.5-325 MG per tablet Take 1 tablet by mouth Every 6 (Six) Hours As Needed for Moderate Pain or Severe Pain. 4/4/23   Shweta Marr MD   losartan (COZAAR) 25 MG tablet Take 1 tablet by mouth Daily for 30 days. 4/1/23 5/1/23  Marty Gloria MD   Multiple Vitamins-Minerals (MULTIVITAL PO) Take 1 tablet by mouth Daily. Indications: VITAMIN DEFICIENCY    Shweta Marr MD   pantoprazole (PROTONIX) 40 MG EC tablet Take 1 tablet by mouth Daily. 12/13/18   Marvin Ramirez MD   potassium gluconate 595 (99 K) MG tablet tablet Take 595 mg by mouth Daily.    Shweta Marr MD   tiZANidine (ZANAFLEX) 4 MG tablet Take 1 tablet by mouth At Night As Needed for Muscle Spasms. Indications: Muscle Spasticity, Musculoskeletal Pain    Shweta Marr MD   tiZANidine (ZANAFLEX) 4 MG tablet Take 1 tablet by mouth Every 8 (Eight) Hours As Needed. Indications: Muscle Spasticity, Musculoskeletal Pain 1/10/23   Shweta Marr MD   traMADol (ULTRAM) 50 MG tablet Take 1 tablet by mouth Every 8 (Eight) Hours As Needed for Moderate Pain. Indications: Pain    ProviderShweta MD       Review of Systems:  Review of Systems   Constitutional: Negative for appetite change, chills, diaphoresis, fever and unexpected weight change.   HENT: Negative for congestion, ear pain, rhinorrhea, sinus pressure, sneezing and sore throat.    Eyes: Negative for photophobia and visual disturbance.   Respiratory: Negative for cough, shortness of  breath, wheezing and stridor.    Cardiovascular: Negative for chest pain, palpitations and leg swelling.   Gastrointestinal: Positive for blood in stool (intermittent melena). Negative for abdominal distention, abdominal pain, constipation, diarrhea, nausea and vomiting.   Genitourinary: Negative for difficulty urinating, dysuria, frequency, hematuria and urgency.   Musculoskeletal: Negative for arthralgias, joint swelling and myalgias.   Skin: Negative for color change, rash and wound.   Neurological: Negative for dizziness, syncope, light-headedness and headaches.   Psychiatric/Behavioral: Negative for agitation, confusion and hallucinations.      Otherwise complete ROS is negative except as mentioned above.    Physical Exam:   Temp:  [97.3 °F (36.3 °C)-98.4 °F (36.9 °C)] 97.3 °F (36.3 °C)  Heart Rate:  [56-62] 61  Resp:  [16-20] 16  BP: (120-138)/(51-63) 138/62  Physical Exam  Vitals and nursing note reviewed.   Constitutional:       General: She is awake. She is not in acute distress.     Appearance: She is underweight. She is not toxic-appearing or diaphoretic.   HENT:      Head: Normocephalic and atraumatic.      Nose: Nose normal. No congestion or rhinorrhea.      Mouth/Throat:      Mouth: Mucous membranes are moist.      Pharynx: Oropharynx is clear. No oropharyngeal exudate or posterior oropharyngeal erythema.   Eyes:      General:         Right eye: No discharge.         Left eye: No discharge.      Extraocular Movements: Extraocular movements intact.      Conjunctiva/sclera: Conjunctivae normal.      Pupils: Pupils are equal, round, and reactive to light.   Cardiovascular:      Rate and Rhythm: Normal rate and regular rhythm.      Pulses: Normal pulses.      Heart sounds: Normal heart sounds. No murmur heard.    No friction rub. No gallop.   Pulmonary:      Effort: Pulmonary effort is normal. No respiratory distress.      Breath sounds: Normal breath sounds. No stridor. No wheezing, rhonchi or rales.    Abdominal:      General: Abdomen is flat. There is no distension.      Palpations: Abdomen is soft. There is no mass.      Tenderness: There is no abdominal tenderness.      Hernia: No hernia is present.   Musculoskeletal:         General: No swelling.      Right lower leg: No edema.      Left lower leg: No edema.   Skin:     Capillary Refill: Capillary refill takes less than 2 seconds.      Coloration: Skin is not jaundiced.      Findings: No bruising, erythema or rash.   Neurological:      General: No focal deficit present.      Mental Status: She is alert and oriented to person, place, and time. Mental status is at baseline.      Cranial Nerves: No cranial nerve deficit.   Psychiatric:         Behavior: Behavior normal. Behavior is cooperative.         Thought Content: Thought content normal.         Judgment: Judgment normal.         Results Reviewed:  I have personally reviewed current lab, radiology, and data and agree with results.  Lab Results (last 24 hours)     Procedure Component Value Units Date/Time    Manual Differential [[id number]]  (Abnormal) Collected: 04/13/23 1857    Specimen: Blood from Arm, Right Updated: 04/13/23 1957     Neutrophil % 71.0 %      Lymphocyte % 15.0 %      Monocyte % 5.0 %      Eosinophil % 1.0 %      Bands %  8.0 %      Neutrophils Absolute 3.36 10*3/mm3      Lymphocytes Absolute 0.64 10*3/mm3      Monocytes Absolute 0.21 10*3/mm3      Eosinophils Absolute 0.04 10*3/mm3      Anisocytosis Mod/2+     Hypochromia Mod/2+     WBC Morphology Normal     Platelet Estimate Decreased    Magnesium [[id number]]  (Normal) Collected: 04/13/23 1802    Specimen: Blood Updated: 04/13/23 1937     Magnesium 1.8 mg/dL     Blood Gas, Venous With Co-Ox [[id number]]  (Abnormal) Collected: 04/13/23 1933    Specimen: Venous Blood Updated: 04/13/23 1934     Site OTHER     pH, Venous 7.383 pH Units      pCO2, Venous 38.5 mm Hg      Comment: 84 Value below reference range        pO2, Venous 123.0 mm Hg       Comment: 83 Value above reference range        HCO3, Venous 22.9 mmol/L      Base Excess, Venous -2.0 mmol/L      Comment: 84 Value below reference range        O2 Saturation, Venous 99.8 %      Comment: 83 Value above reference range        Hemoglobin, Blood Gas 6.3 g/dL      Comment: 85 Value below critical limit        Oxyhemoglobin Venous 90.6 %      Comment: 83 Value above reference range        Methemoglobin Venous 0.9 %      Carboxyhemoglobin Venous 8.3 %      Comment: 83 Value above reference range        Sodium, Venous 138 mmol/L      Potassium, Venous 3.0 mmol/L      Comment: 84 Value below reference range        Barometric Pressure for Blood Gas 742 mmHg      Modality Room Air     Ventilator Mode NA     Comment: Meter: [id number]     :  [id number]        Note --    Extra Tubes [[id number]] Collected: 04/13/23 1815    Specimen: Blood, Venous Line Updated: 04/13/23 1930    Narrative:      The following orders were created for panel order Extra Tubes.  Procedure                               Abnormality         Status                     ---------                               -----------         ------                     Gold Top - SST[[id number]]                                   Final result               Light Blue Top[[id number]]                                   Final result                 Please view results for these tests on the individual orders.    Gold Top - SST [[id number]] Collected: 04/13/23 1815    Specimen: Blood Updated: 04/13/23 1930     Extra Tube Hold for add-ons.     Comment: Auto resulted.       Light Blue Top [[id number]] Collected: 04/13/23 1815    Specimen: Blood Updated: 04/13/23 1930     Extra Tube Hold for add-ons.     Comment: Auto resulted       CBC & Differential [[id number]]  (Abnormal) Collected: 04/13/23 1857    Specimen: Blood from Arm, Right Updated: 04/13/23 1907    Narrative:      The following orders were created for panel order CBC & Differential.  Procedure                                Abnormality         Status                     ---------                               -----------         ------                     CBC Auto Differential[[id number]]        Abnormal            Final result               Scan Slide[[id number]]                                                                    Please view results for these tests on the individual orders.    CBC Auto Differential [[id number]]  (Abnormal) Collected: 04/13/23 1857    Specimen: Blood from Arm, Right Updated: 04/13/23 1907     WBC 4.25 10*3/mm3      RBC 2.08 10*6/mm3      Hemoglobin 5.8 g/dL      Hematocrit 18.9 %      MCV 90.9 fL      MCH 27.9 pg      MCHC 30.7 g/dL      RDW 16.2 %      RDW-SD 54.4 fl      MPV 10.5 fL      Platelets 99 10*3/mm3     Comprehensive Metabolic Panel [[id number]]  (Abnormal) Collected: 04/13/23 1802    Specimen: Blood Updated: 04/13/23 1835     Glucose 148 mg/dL      BUN 18 mg/dL      Creatinine 1.67 mg/dL      Sodium 138 mmol/L      Potassium 3.0 mmol/L      Comment: Slight hemolysis detected by analyzer. Results may be affected.        Chloride 107 mmol/L      CO2 21.0 mmol/L      Calcium 7.6 mg/dL      Total Protein 5.2 g/dL      Albumin 2.2 g/dL      ALT (SGPT) 27 U/L      AST (SGOT) 35 U/L      Alkaline Phosphatase 393 U/L      Total Bilirubin 0.6 mg/dL      Globulin 3.0 gm/dL      A/G Ratio 0.7 g/dL      BUN/Creatinine Ratio 10.8     Anion Gap 10.0 mmol/L      eGFR 30.7 mL/min/1.73     Narrative:      GFR Normal >60  Chronic Kidney Disease <60  Kidney Failure <15    The GFR formula is only valid for adults with stable renal function between ages 18 and 70.        Imaging Results (Last 24 Hours)     Procedure Component Value Units Date/Time    XR Hip With or Without Pelvis 2 - 3 View Right [[id number]] Collected: 04/13/23 1847     Updated: 04/13/23 1946    Narrative:      XR HIP RIGHT W-OR-WO PELVIS 2-3 VIEWS    HISTORY:  Hip dislocation.    FINDINGS:  Right hip hemiarthroplasty  which is dislocated and proximally retracted by  approximately 8 cm.  Nonobstructive bowel gas pattern partially obscuring the  sacrum. No clear acute fracture periprosthetic or otherwise.        XR Femur 2 View Right [[id number]] Collected: 04/13/23 1847     Updated: 04/13/23 1946    Narrative:      XR FEMUR 2 VW RIGHT    HISTORY:  Hip pain.    FINDINGS:  No acute osseous fracture.  Soft tissues are grossly unremarkable.  Right hip  dislocation better delineated on dedicated hip radiograph.        XR Chest 1 View [[id number]] Collected: 04/13/23 1847     Updated: 04/13/23 1905    Narrative:      FINDINGS:  Low lung volumes. There is slight elevation of the left hemidiaphragm with left  basilar atelectasis versus infiltrate.   There is mild vascular congestion.      Impression:        1.  Slight elevation of the left hemidiaphragm.  Mild left basilar atelectasis  versus infiltrate.    2.  The heart size is normal.  There is slight central vascular congestion.            Assessment:    Active Hospital Problems    Diagnosis    • **Dislocation of right hip, initial encounter    • Iron deficiency anemia due to chronic blood loss    • Anemia    • CKD (chronic kidney disease) stage 3, GFR 30-59 ml/min    • Hypokalemia    • Hypocalcemia    • Carotid stenosis, asymptomatic, bilateral    • Essential hypertension    • Coronary atherosclerosis of native coronary artery          Medical Decision Making  Number and Complexity of problems: 7 - High    Differential Diagnosis:   As above    Conditions and Status:        Condition is improving.  Condition is at treatment goal.     [facility] Data  External documents reviewed: Chart Review  My EKG interpretation: Normal sinus rhythm  My plain film interpretation: Right prosthetic hip dislocation  Tests considered but not ordered: CT hip     Decision rules/scores evaluated (example [id number], Wells, etc): Nil     Discussed with: The patient     Treatment Plan  1. No blood thinners until  further investigation as the patient describes episodes of melena. However, there is a significant clinical consideration to be made of benefits v risks because the patient has had a PE in the past (?  Remote or distant)  2.  The orthopedic surgeon Dr. Jesus was present at the same time as my assessment and will be taking the patient states that this evening  3. Blood transfusion x2 units PRBCs  4.  We will contact gastroenterology for further work-up of the patient's melena and severe anemia  5.  Serial H&H's  6.  Electrolyte replacement protocol  7.  FOBT  8.  I will obtain a bone specific alkaline phosphatase level and we can further investigate the elevated total alkaline phosphatase as appropriate with further information provided by this (which we will give us a clear direction of how to determine diagnosis of this abnormality)      Care Planning  Shared decision making: With the patient  Code status and discussions: Full code    Disposition  Social Determinants of Health that impact treatment or disposition: Elderly, lives alone  I expect the patient to be discharged in 2-5 days but will require significant rehab.       Geovani Guy MD    Electronically signed by Geovani Guy MD, 04/14/23, 7:29 AM CDT.

## 2023-04-14 NOTE — ED NOTES
Risks and benefits of blood transfusion discussed with pt by MD. Pt denies questions. Written consent obtained and on chart.

## 2023-04-14 NOTE — CONSULTS
Adult Nutrition  Assessment/PES    Patient Name:  Rosaura Salgado  YOB: 1940  MRN: [id number]  Admit Date:  4/13/2023    Assessment Date:  4/14/2023    Comments:  Pt admitted d/t dislocation of right hip. PMH of CAD, cirrhosis, HTN, and PVD. RDN seeing pt r/t PI and BMI. Denies food allergies, difficulty chewing/swallowing, n/v, and changes to normal BM. Pt states she was eating good pta. Pt lives with her daughter who prepares food for her at home. Pt states she believes she has gained a few pounds since recent admit in march. Per epic, wt 3/27 109#, 4/4 114#, # likely d/t medical equipment in bed? Pt met ASPEN criteria for malnutrition on last admission. NFPE reveals muscle wasting and fat loss. Continues to meet ASPEN criteria for chronic moderate malnutrition r/t physical findings. Pt has increase needs due to pressure injuries. Pt agrees to whole milk TID and chocolate ice cream BID to optimize nutritional intake. Will monitor PO intake. RDN staff will follow hospital course.        Reason for Assessment     Row Name 04/14/23 1041          Reason for Assessment    Reason For Assessment identified at risk by screening criteria (P)      Identified At Risk by Screening Criteria large or nonhealing wound, burn or pressure injury (P)                 Nutrition/Diet History     Row Name 04/14/23 1041          Nutrition/Diet History    Typical Intake (Food/Fluid/EN/PN) Pt states that she has been eating well at home. Lives with her daughter who prepares all meals for her. Eats two meals and sometimes a snack a day. (P)      Food Preferences Whole milk (P)                 Labs/Tests/Procedures/Meds     Row Name 04/14/23 1050          Labs/Procedures/Meds    Lab Results Reviewed reviewed, pertinent (P)      Lab Results Comments K 3.3, glu 168, Cr 1.59, Alb 2.6 (P)         Medications    Pertinent Medications Reviewed reviewed, pertinent (P)                   Estimated/Assessed Needs -  Anthropometrics     Row Name 04/14/23 1051 04/14/23 0500       Anthropometrics    Weight -- 58 kg (127 lb 12.8 oz)  pt has abductor pillow between legs    Weight for Calculation 57.6 kg (127 lb) (P)  --       Estimated/Assessed Needs    Additional Documentation Protein Requirements (Group);KCAL/KG (Group);Fluid Requirements (Group) (P)  --       KCAL/KG    KCAL/KG 25 Kcal/Kg (kcal);30 Kcal/Kg (kcal) (P)  --    25 Kcal/Kg (kcal) 1440.175 (P)  --    30 Kcal/Kg (kcal) 1728.21 (P)  --       Protein Requirements    Weight Used For Protein Calculations 57.6 kg (127 lb) (P)  --    Est Protein Requirement Amount (gms/kg) 1.0 gm protein (P)  --    Estimated Protein Requirements (gms/day) 57.61 (P)  --       Fluid Requirements    Fluid Requirements (mL/day) 1600 (P)  --    RDA Method (mL) 1600 (P)  --               Nutrition Prescription Ordered     Row Name 04/14/23 1052          Nutrition Prescription PO    Current PO Diet Regular (P)      Fluid Consistency Thin (P)                   Malnutrition Severity Assessment     Row Name 04/14/23 1052          Malnutrition Severity Assessment    Malnutrition Type Chronic Disease - Related Malnutrition (P)         Insufficient Energy Intake     Insufficient Energy Intake Findings None (P)         Unintentional Weight Loss     Unintentional Weight Loss Findings None (P)         Muscle Loss    Loss of Muscle Mass Findings Severe (P)      Muslim Region Severe - deep hollowing/scooping, lack of muscle to touch, facial bones well defined (P)      Clavicle Bone Region Severe - protruding prominent bone (P)      Acromion Bone Region Severe - squared shoulders, bones, and acromion process protrusion prominent (P)      Scapular Bone Region Severe - prominent bones, depressions easily visible between ribs, scapula, spine, shoulders (P)      Dorsal Hand Region Moderate - slight depression (P)      Patellar Region -- (P)   mild     Anterior Thigh Region -- (P)   mild     Posterior Calf Region --  (P)   mild        Fat Loss    Subcutaneous Fat Loss Findings Moderate (P)      Orbital Region  Moderate -  somewhat hollowness, slightly dark circles (P)      Upper Arm Region Severe - mostly skin, very little space between folds, fingers touch (P)      Thoracic & Lumbar Region Moderate - ribs visible with mild depressions, iliac crest somewhat prominent (P)         Fluid Accumulation (Edema)    Fluid Acumulation Findings -- (P)   none        Criteria Met (Must meet criteria for severity in at least 2 of these categories: M Wasting, Fat Loss, Fluid, Secondary Signs, Wt. Status, Intake)    Patient meets criteria for  Moderate (non-severe) Malnutrition (P)                  Problem/Interventions:   Problem 1     Row Name 04/14/23 1055          Nutrition Diagnoses Problem 1    Problem 1 Increased Nutrient Needs (P)      Macronutrient Protein;Kcal (P)      Etiology (related to) Medical Diagnosis (P)      Skin Pressure injury (P)      Signs/Symptoms (evidenced by) BMI;Biochemical (P)      BMI 20 - 20.9 (P)      Specific Labs Noted Albumin (P)   Alb 2.2                Problem 2     Row Name 04/14/23 1056          Nutrition Diagnoses Problem 2    Problem 2 Malnutrition (P)      Signs/Symptoms (evidenced by) BMI;Other (comment) (P)   physical findings     BMI 20 - 20.9 (P)                     Intervention Goal     Row Name 04/14/23 1057          Intervention Goal    General Maintain nutrition;Reduce/improve symptoms;Meet nutritional needs for age/condition (P)      PO Increase intake (P)      Weight Maintain weight (P)                 Nutrition Intervention     Row Name 04/14/23 1057          Nutrition Intervention    RD/Tech Action Follow Tx progress;Care plan reviewd;Advise alternate selection;Advise available snack;Interview for preference;Encourage intake;Recommend/ordered (P)      Recommended/Ordered Supplement (P)                 Nutrition Prescription     Row Name 04/14/23 1055          Nutrition Prescription PO    PO  Prescription Begin/change supplement (P)      Supplement Milk;Ice Cream (P)      Supplement Frequency 3 times a day;2 times a day (P)      Common Modifiers Cardiac (P)                 Education/Evaluation     Row Name 04/14/23 1051          Education    Education Provided education regarding;Education topics (P)      Provided education regarding Diet rationale (P)      Education Topics Basic nutrition;Protein (P)         Monitor/Evaluation    Monitor Per protocol;PO intake;Supplement intake;Weight;Skin status;Symptoms (P)                  Electronically signed by:  Sophie Prater  04/14/23 10:59 CDT

## 2023-04-14 NOTE — CONSULTS
[facility]   Orthopedic Consult Note    Patient Name: Rosaura Salgado  : 1940  MRN: [id number]  Primary Care Physician: Jin Dumont MD  Referring Physician: Christian Javier MD  Date of admission: 2023    Inpatient Case Management  Consult  Consult performed by: Juan Carlos Jesus MD  Consult ordered by: Juan Carlos Jesus MD  Reason for consult: right hip dislocation        Subjective   Subjective     Reason for Consult/ Chief Complaint: right hip pain    History of Present Illness   Patient is an 82-year-old female who is approximately 2 weeks status post right femoral neck fracture that was treated with bipolar endoprosthesis.  Patient was doing very well and complained of no pain involving her right hip since surgery.  She was progressing very well with mobility, however, she sat down this evening and crossed her right leg over her left sitting like a tailor when she had a severe pain in her right hip.  She was unable to stand and unable to bear weight.  She was brought to the emergency department where she was evaluated and found to have a prosthetic hip dislocation.    Patient was admitted for further evaluation and reduction of the hip.  She has significant anemia as well as hypokalemia.  She denies any numbness or tingling.  She denies any hemoptysis, hematochezia, melena, or any other bleeding issues.  She has been on Lovenox.    Review of Systems   Constitutional: Negative for chills and fever.   Respiratory: Negative.    Cardiovascular: Negative.    Gastrointestinal: Negative.    Musculoskeletal:        Right hip pain   All other systems reviewed and are negative.       Personal History     Past Medical History:   Diagnosis Date   • Allergic rhinitis    • Coronary atherosclerosis of native coronary artery    • Depression    • Fibromyalgia    • GERD (gastroesophageal reflux disease)    • Hyperlipidemia    • Hypertension    • On anticoagulant  therapy    • Peptic ulcer    • Peripheral vascular disease, unspecified    • Pneumonia    • Pulmonary embolus        Past Surgical History:   Procedure Laterality Date   • CAROTID ENDARTERECTOMY Left    • COLONOSCOPY N/A 2/1/2019    Procedure: COLONOSCOPY;  Surgeon: Maurice Carmona DO;  Location: [facility] ENDOSCOPY;  Service: Gastroenterology   • ENDOSCOPY N/A 2/1/2019    Procedure: ESOPHAGOGASTRODUODENOSCOPY;  Surgeon: Maurice Carmona DO;  Location: [facility] ENDOSCOPY;  Service: Gastroenterology   • HIP BIPOLAR REPLACEMENT Right 3/28/2023    Procedure: RIGHT HIP BIPOLAR ANTERIOR APPROACH;  Surgeon: Juan Carlos Jesus MD;  Location: [facility] OR;  Service: Orthopedics;  Laterality: Right;   • KNEE SURGERY         Family History: family history includes COPD in her mother; Cancer in an other family member; Other in her father; Thyroid disease in an other family member. Otherwise pertinent FHx was reviewed and not pertinent to current issue.    Social History:  reports that she has quit smoking. She has never used smokeless tobacco. She reports that she does not drink alcohol and does not use drugs.    Home Medications:  Enoxaparin Sodium, HYDROcodone-acetaminophen, Vitamin D, amLODIPine, aspirin, carvedilol, losartan, multivitamin with minerals, pantoprazole, potassium gluconate, tiZANidine, and traMADol      Allergies:  No Known Allergies    Objective    Objective   Vitals:  Temp:  [97.3 °F (36.3 °C)-98.4 °F (36.9 °C)] 97.3 °F (36.3 °C)  Heart Rate:  [56-62] 61  Resp:  [16-20] 16  BP: (120-138)/(51-63) 138/62    Physical Exam  Vitals reviewed.   Constitutional:       General: She is not in acute distress.     Appearance: Normal appearance. She is well-developed.   Eyes:      Conjunctiva/sclera: Conjunctivae normal.      Pupils: Pupils are equal, round, and reactive to light.   Neck:      Trachea: No tracheal deviation.   Cardiovascular:      Rate and Rhythm: Normal rate and regular rhythm.      Heart sounds:  Normal heart sounds.   Pulmonary:      Effort: Pulmonary effort is normal.      Breath sounds: Normal breath sounds.   Chest:      Chest wall: No tenderness.   Abdominal:      General: Bowel sounds are normal. There is no distension.      Palpations: Abdomen is soft. There is no mass.      Tenderness: There is no abdominal tenderness.   Musculoskeletal:      Cervical back: Neck supple. No muscular tenderness.      Comments: Right lower extremity is shortened and slightly externally rotated.  She is able to wiggle her toes and she has good distal pulses and sensation.  She has good capillary refill.  She has severe pain with any attempted motion of her knee or her hip.   Lymphadenopathy:      Cervical: No cervical adenopathy.   Skin:     General: Skin is warm.      Findings: No rash.   Neurological:      Mental Status: She is alert and oriented to person, place, and time.   Psychiatric:         Behavior: Behavior normal.         Thought Content: Thought content normal.         Judgment: Judgment normal.         Result Review    Result Review:  I have personally reviewed the results from the time of this admission to 4/13/2023 20:42 CDT and agree with these findings:  [x]  Laboratory  []  Microbiology  [x]  Radiology  []  EKG/Telemetry   []  Cardiology/Vascular   []  Pathology  [x]  Old records  []  Other:  Most notable findings include:     XR Shoulder 2+ View Left    Result Date: 3/27/2023  Narrative: EXAM: XR SHOULDER 2 OR MORE VIEWS LEFT HISTORY: Left shoulder pain. COMPARISON: None FINDINGS: Mineralization: Osseous demineralization Bones: No visualized fracture. Degenerative changes at the acromioclavicular and glenohumeral joint. Other: Nodular density left midlung     Impression: No visualized fracture. Nodular density left midlung. Correlate with dedicated chest x-ray Electronically signed by:  Mo Patton DO  3/27/2023 10:04 AM CDT Workstation: [id number]    XR Humerus Left    Result Date:  3/27/2023  Narrative: PROCEDURE: Left humerus x-rays with 2 views. INDICATION: Trauma. Fall. COMPARISON: None. FINDINGS: No fracture or acute osseous abnormality in the left humerus. No soft tissue abnormality. The left shoulder and left elbow also appear to be intact.     Impression: Negative left humerus. [id number] Electronically signed by:  Jareth Rodríguez MD  3/27/2023 10:07 AM CDT Workstation: [id number]    XR Femur 2 View Right    Result Date: 4/13/2023  Narrative: XR FEMUR 2 VW RIGHT HISTORY: Hip pain. FINDINGS: No acute osseous fracture.  Soft tissues are grossly unremarkable.  Right hip dislocation better delineated on dedicated hip radiograph.     XR Femur 2 View Right    Result Date: 3/30/2023  Narrative: PROCEDURE: Right femur x-rays with 2 views. INDICATION: pain after hip surgery, S72.001A Fracture of unspecified part of neck of right femur, initial encounter for closed fracture D72.819 Decreased white blood cell count, unspecified D69.6 Thrombocytopenia, unspecified E87.6 Hypokalemia R00.1 Bradycardia, unspecified S72.001A Fracture of unspecified part of neck of right femur, initial encounter for closed fracture I65.23 Occlusion and stenosis of bila COMPARISON: 3/27/2023 right femur and hip x-rays. FINDINGS: There has been interval surgery with hip replacement now present. The femoral head component is aligned with the acetabular component. The components normally and seated in respective bony elements. Other than the recent hip surgery no acute osseous abnormalities evident in the femur. There are significant bubbly and linear gas densities in the soft tissues over the lateral mid and distal thigh area. Could be postoperative in nature however cannot exclude infectious etiology.     Impression: Right hip replacement normally aligned and normally seated bony elements. Significant bubbly and linear gas densities in the lateral mid and distal thigh soft tissues of uncertain etiology. Could be postoperative  related surgery however cannot exclude underlying infectious etiology include possibility of necrotizing fasciitis. Findings personally discussed with Dr. Ann Marie Willett assistant at 8:15 AM CDT at time of this dictation. [id number] Electronically signed by:  Jareth Rodríguez MD  3/30/2023 8:18 AM CDT Workstation: [id number]    XR Femur 2 View Right    Result Date: 3/27/2023  Narrative: PROCEDURE: Right femur x-rays with 2 views. INDICATION: Trauma. Fall. COMPARISON: Right hip x-ray same date. FINDINGS: Impacted subcapital fracture right hip with cephalad displacement and varus angulation. Femur distal to this down to the knee is intact with no associated fractures distally.     Impression: Impacted subcapital fracture right hip detailed above. Remainder right femur intact. [id number] Electronically signed by:  Jareth Rodríguez MD  3/27/2023 10:13 AM CDT Workstation: [id number]    XR Knee 1 or 2 View Right    Result Date: 3/30/2023  Narrative: PROCEDURE: Right knee x-rays with 2 views. INDICATION: pain after hip surgery, S72.001A Fracture of unspecified part of neck of right femur, initial encounter for closed fracture D72.819 Decreased white blood cell count, unspecified D69.6 Thrombocytopenia, unspecified E87.6 Hypokalemia R00.1 Bradycardia, unspecified S72.001A Fracture of unspecified part of neck of right femur, initial encounter for closed fracture I65.23 Occlusion and stenosis of bila No fracture or acute osseous abnormality in the right knee. Mild narrowing of the medial lateral joint spaces and small osteophytes along the medial femoral condylar region. No joint effusion. Heterogeneous bubbly and linear gas densities in the soft tissues over the lateral aspect of the mid and distal thigh area. Could be postoperative however infectious etiologies not excluded.     Impression: No acute osseous abnormalities in the knee. Degenerative changes medial and lateral joint space detailed above. Bubbly and linear gas  densities in the soft tissues over the lateral aspect mid and distal thigh. Could be postoperative in etiology however infectious etiology not excluded. [id number] Electronically signed by:  Jareth Rodríguez MD  3/30/2023 8:19 AM CDT Workstation: [id number]    CT Head Without Contrast    Result Date: 3/27/2023  Narrative: EXAM: Head CT without contrast. CLINICAL INDICATION: Head trauma trauma COMPARISON: No relevant priors available TECHNIQUE:  5 mm slice thickness images (vertex to skull base) No intravenous contrast administered. Coronal/sagittal reformations were employed. All CT scans at this facility use dose modulation, iterative reconstruction, and/or weight based dosing when appropriate to reduce radiation dose to as low as reasonably achievable. FINDINGS:  There is mild global parenchymal volume loss. There is no intraparenchymal or intraventricular hemorrhage. There is no intra-axial mass or extra-axial fluid collection. There is no obvious midline shift or mass effect.  There is patchy periventricular hypoattenuation. The pituitary gland appears normal. There is evidence of cataract surgery. There is subperiosteal mucosal thickening of the sphenoid sinuses. Bilateral mastoid air cells our clear. There is no calvarial or scalp soft tissue abnormality.     Impression: CONCLUSION: 1. No acute intracranial abnormality identified. 2. Age-related atrophy and microvascular changes. Electronically signed by:  Robert Sow DO  3/27/2023 11:14 AM CDT Workstation: [id number]    CT Cervical Spine Without Contrast    Result Date: 3/27/2023  Narrative: EXAM: CT cervical spine without contrast CLINICAL INDICATION: Trauma, injury COMPARISON: No relevant priors available TECHNIQUE: 2 mm axial slice thickness images cervical spine No intravenous contrast was utilized for exam All CT scans at this facility use dose modulation, iterative reconstruction, and/or weight based dosing when appropriate to reduce radiation dose to as low  as reasonably achievable. FINDINGS: The visualized brain parenchyma is normal. There is subperiosteal mucosal thickening of the sphenoid sinuses. The bilateral mastoid air cells well pneumatized. The neck soft tissues are symmetric without hematoma, laceration or subcutaneous gas. There are surgical clips in the region of left upper neck. There are paraseptal emphysematous changes and areas of biapical intralobular septal thickening. There is calcific pleural/parenchymal scarring . No pleural effusion within the field-of-view. There is normal cervical lordosis without obvious fracture, subluxation or aggressive/destructive change. There is no evidence of traumatic distraction injury. There is multilevel spondylitic change, endplate sclerosis and multilevel disc space narrowing. Moderate to severe narrowing at C6-C7. There is multilevel facet and uncovertebral joint hypertrophy. There is no prevertebral soft tissue swelling/edema.     Impression: CONCLUSION: 1. Cervical degenerative changes without fracture. 2. Chronic biapical edema along thickening which may represent areas of pulmonary edema or atelectasis. Electronically signed by:  Robert Sow DO  3/27/2023 11:22 AM CDT Workstation: [id number]    XR Chest 1 View    Result Date: 4/13/2023  Narrative: FINDINGS: Low lung volumes. There is slight elevation of the left hemidiaphragm with left basilar atelectasis versus infiltrate.   There is mild vascular congestion.     Impression: 1.  Slight elevation of the left hemidiaphragm.  Mild left basilar atelectasis versus infiltrate. 2.  The heart size is normal.  There is slight central vascular congestion.    XR Hip With or Without Pelvis 2 - 3 View Right    Result Date: 4/13/2023  Narrative: XR HIP RIGHT W-OR-WO PELVIS 2-3 VIEWS HISTORY: Hip dislocation. FINDINGS: Right hip hemiarthroplasty which is dislocated and proximally retracted by approximately 8 cm.  Nonobstructive bowel gas pattern partially obscuring the  sacrum. No clear acute fracture periprosthetic or otherwise.     XR Hip With or Without Pelvis 2 - 3 View Right    Result Date: 3/27/2023  Narrative: PROCEDURE: Pelvis and right hip x-rays with 3 views. INDICATION: Trauma. Fall. COMPARISON: None. FINDINGS: There is a subcapital fracture of the right hip with impaction and cephalad displacement as well as varus angulation. Femoral head normally aligned with the acetabulum. No other fractures evident in the bony pelvis or left hip. Degenerative arthritic changes in the left hip. Incidental finding of a 2.9-1.2 cm laminated gallbladder calculi right mid abdomen.     Impression: Subcapital right hip fracture with impaction and mild cephalad displacement with varus angulation. Couple laminated gallbladder calculi detailed above present on previous CT abdomen 2018. [id number] Electronically signed by:  Jareth Rodríguez MD  3/27/2023 10:11 AM CDT Workstation: [id number]        Assessment & Plan   Assessment / Plan         Active Hospital Problems:  Active Hospital Problems    Diagnosis    • **Dislocation of right hip, initial encounter    • Iron deficiency anemia due to chronic blood loss    • Anemia    • Carotid stenosis, asymptomatic, bilateral    • Essential hypertension    • Coronary atherosclerosis of native coronary artery        Plan:     Rosaura Salgado is a 82 y.o. female who has a dislocation of right bipolar hip prosthesis.  Patient was attempting to sit as a tailor.  She also has anemia and hypokalemia.  Discussion was held with hospitalist and with anesthesia to proceed with surgical intervention for closed reduction of the right hip with possible open reduction.  Discussion was had with the patient and that we would attempt closed reduction at this time but that if she dislocated again or if she develop further difficulties then she would need to be converted to total hip arthroplasty.    The patient voiced understanding of the risks, benefits, and  alternative forms of treatment that were discussed and the patient consents to proceed with surgery.  All risks, benefits and alternatives were discussed.  Risks include, but not exclusive to anesthetic complications, including death, MI, CVA, infection, bleeding DVT, fracture, residual pain and need for future surgery.    This discussion was held with the patient by Juan Carlos Jesus MD and all questions were answered.    Plan closed reduction of right hip dislocation with possible open reduction.    Discussion was held with Dr. Guy regarding DVT prophylaxis postoperatively.  We discussed further work-up to include Hemoccult and possible further evaluation depending on the results of Hemoccult.  Patient will be treated with Blood transfusion and we will determine continue DVT prophylaxis pending further evaluation.    Thank you for this consult and for allowing me to participate in the care of this patient.    Electronically signed by Juan Carlos Jesus MD, 04/13/23, 8:42 PM CDT.

## 2023-04-14 NOTE — PROGRESS NOTES
Discharge Planning Assessment  [facility]     Patient Name: Rosaura Salgado  MRN: [id number]  Today's Date: 4/14/2023    Admit Date: 4/13/2023    Plan: Hip fracture - PERRYV  MACKENZIE Romero RNCM   Discharge Needs Assessment     Row Name 04/14/23 1343       Living Environment    People in Home child(jazmin), adult    Name(s) of People in Home maxwell    Current Living Arrangements home    Primary Care Provided by self    Provides Primary Care For no one    Family Caregiver if Needed child(jazmin), adult    Family Caregiver Names manju    Able to Return to Prior Arrangements yes       Transition Planning    Patient/Family Anticipates Transition to home with help/services    Patient/Family Anticipated Services at Transition home health care       Discharge Needs Assessment    Equipment Currently Used at Home walker, rolling;commode;wheelchair    Concerns to be Addressed denies needs/concerns at this time    Anticipated Changes Related to Illness inability to care for self    Equipment Needed After Discharge oxygen    Outpatient/Agency/Support Group Needs homecare agency    Discharge Facility/Level of Care Needs home with home health    Patient's Choice of Community Agency(s) [facility]    Current Discharge Risk dependent with mobility/activities of daily living    Discharge Coordination/Progress pt lives with daughter who was a nurse.   states no needs for dc.  has a rolling walker less than 5 yrs,   has a commode and an over sized wheelchair,  wanted a new wheelchair, but would not be eligiable due to having a rw .   explained that she could borrow or check on rentals.  pt currently on o2,  ok to usse [employer]grass if o2 is needed at dc.  had used [facility] in the past and would like again.   pt will be discharged on baby asa.               Discharge Plan    No documentation.               Continued Care and Services - Admitted Since 4/13/2023    Coordination has not been started for this encounter.     Selected Continued Care - Prior  Encounters Includes continued care and service providers with selected services from prior encounters from 1/13/2023 to 4/14/2023    Discharged on 3/31/2023 Admission date: 3/27/2023 - Discharge disposition: Home-Health Care c    Home Medical Care     Service Provider Selected Services Address Phone Fax Patient Preferred    [facility] ,  Home Rehabilitation ,  Home Nursing [address] [facility] [id number] [phone] --                    Expected Discharge Date and Time     Expected Discharge Date Expected Discharge Time    Apr 15, 2023          Demographic Summary     Row Name 04/14/23 1342       General Information    Admission Type observation    Arrived From home    Required Notices Provided Observation Status Notice    Reason for Consult discharge planning    Preferred Language English    General Information Comments confirmed face sheet               Functional Status    No documentation.                Psychosocial    No documentation.                Abuse/Neglect    No documentation.                Legal    No documentation.                Substance Abuse    No documentation.                Patient Forms    No documentation.                   Lenore Dobbins RN

## 2023-04-15 ENCOUNTER — READMISSION MANAGEMENT (OUTPATIENT)
Dept: CALL CENTER | Facility: HOSPITAL | Age: 83
End: 2023-04-15
Payer: MEDICARE

## 2023-04-15 VITALS
RESPIRATION RATE: 16 BRPM | HEART RATE: 82 BPM | BODY MASS INDEX: 18.4 KG/M2 | SYSTOLIC BLOOD PRESSURE: 160 MMHG | OXYGEN SATURATION: 100 % | TEMPERATURE: 97.6 F | DIASTOLIC BLOOD PRESSURE: 74 MMHG | WEIGHT: 114.5 LBS | HEIGHT: 66 IN

## 2023-04-15 LAB
BASOPHILS # BLD AUTO: 0.03 10*3/MM3 (ref 0–0.2)
BASOPHILS NFR BLD AUTO: 0.5 % (ref 0–1.5)
BH BB BLOOD EXPIRATION DATE: NORMAL
BH BB BLOOD EXPIRATION DATE: NORMAL
BH BB BLOOD TYPE BARCODE: 6200
BH BB BLOOD TYPE BARCODE: 6200
BH BB DISPENSE STATUS: NORMAL
BH BB DISPENSE STATUS: NORMAL
BH BB PRODUCT CODE: NORMAL
BH BB PRODUCT CODE: NORMAL
BH BB UNIT NUMBER: NORMAL
BH BB UNIT NUMBER: NORMAL
CROSSMATCH INTERPRETATION: NORMAL
CROSSMATCH INTERPRETATION: NORMAL
DEPRECATED RDW RBC AUTO: 52.3 FL (ref 37–54)
EOSINOPHIL # BLD AUTO: 0.06 10*3/MM3 (ref 0–0.4)
EOSINOPHIL NFR BLD AUTO: 1 % (ref 0.3–6.2)
ERYTHROCYTE [DISTWIDTH] IN BLOOD BY AUTOMATED COUNT: 16.1 % (ref 12.3–15.4)
HCT VFR BLD AUTO: 29.1 % (ref 34–46.6)
HGB BLD-MCNC: 9.3 G/DL (ref 12–15.9)
IMM GRANULOCYTES # BLD AUTO: 0.02 10*3/MM3 (ref 0–0.05)
IMM GRANULOCYTES NFR BLD AUTO: 0.3 % (ref 0–0.5)
LYMPHOCYTES # BLD AUTO: 1.34 10*3/MM3 (ref 0.7–3.1)
LYMPHOCYTES NFR BLD AUTO: 23.4 % (ref 19.6–45.3)
MCH RBC QN AUTO: 28.4 PG (ref 26.6–33)
MCHC RBC AUTO-ENTMCNC: 32 G/DL (ref 31.5–35.7)
MCV RBC AUTO: 88.7 FL (ref 79–97)
MONOCYTES # BLD AUTO: 0.39 10*3/MM3 (ref 0.1–0.9)
MONOCYTES NFR BLD AUTO: 6.8 % (ref 5–12)
NEUTROPHILS NFR BLD AUTO: 3.89 10*3/MM3 (ref 1.7–7)
NEUTROPHILS NFR BLD AUTO: 68 % (ref 42.7–76)
NRBC BLD AUTO-RTO: 0 /100 WBC (ref 0–0.2)
PLATELET # BLD AUTO: 105 10*3/MM3 (ref 140–450)
PMV BLD AUTO: 9.9 FL (ref 6–12)
RBC # BLD AUTO: 3.28 10*6/MM3 (ref 3.77–5.28)
UNIT  ABO: NORMAL
UNIT  ABO: NORMAL
UNIT  RH: NORMAL
UNIT  RH: NORMAL
WBC NRBC COR # BLD: 5.73 10*3/MM3 (ref 3.4–10.8)

## 2023-04-15 PROCEDURE — 63710000001 ASPIRIN 81 MG TABLET DELAYED-RELEASE: Performed by: ORTHOPAEDIC SURGERY

## 2023-04-15 PROCEDURE — A9270 NON-COVERED ITEM OR SERVICE: HCPCS | Performed by: ORTHOPAEDIC SURGERY

## 2023-04-15 PROCEDURE — G0378 HOSPITAL OBSERVATION PER HR: HCPCS

## 2023-04-15 PROCEDURE — 97530 THERAPEUTIC ACTIVITIES: CPT

## 2023-04-15 PROCEDURE — 63710000001 AMLODIPINE 5 MG TABLET: Performed by: ORTHOPAEDIC SURGERY

## 2023-04-15 PROCEDURE — 97116 GAIT TRAINING THERAPY: CPT

## 2023-04-15 PROCEDURE — 63710000001 LOSARTAN 25 MG TABLET: Performed by: ORTHOPAEDIC SURGERY

## 2023-04-15 PROCEDURE — 63710000001 CHOLECALCIFEROL 25 MCG (1000 UT) TABLET: Performed by: ORTHOPAEDIC SURGERY

## 2023-04-15 PROCEDURE — 99024 POSTOP FOLLOW-UP VISIT: CPT | Performed by: ORTHOPAEDIC SURGERY

## 2023-04-15 PROCEDURE — 63710000001 PANTOPRAZOLE 40 MG TABLET DELAYED-RELEASE: Performed by: ORTHOPAEDIC SURGERY

## 2023-04-15 PROCEDURE — 97110 THERAPEUTIC EXERCISES: CPT

## 2023-04-15 PROCEDURE — 85025 COMPLETE CBC W/AUTO DIFF WBC: CPT | Performed by: FAMILY MEDICINE

## 2023-04-15 PROCEDURE — 63710000001 HYDROCODONE-ACETAMINOPHEN 7.5-325 MG TABLET: Performed by: ORTHOPAEDIC SURGERY

## 2023-04-15 PROCEDURE — 63710000001 CARVEDILOL 3.125 MG TABLET: Performed by: ORTHOPAEDIC SURGERY

## 2023-04-15 PROCEDURE — 97535 SELF CARE MNGMENT TRAINING: CPT

## 2023-04-15 RX ORDER — ASPIRIN 81 MG/1
81 TABLET ORAL 2 TIMES DAILY
Qty: 58 TABLET | Refills: 0 | Status: SHIPPED | OUTPATIENT
Start: 2023-04-15 | End: 2023-05-14

## 2023-04-15 RX ADMIN — LOSARTAN POTASSIUM 25 MG: 25 TABLET, FILM COATED ORAL at 08:33

## 2023-04-15 RX ADMIN — HYDROCODONE BITARTRATE AND ACETAMINOPHEN 1 TABLET: 7.5; 325 TABLET ORAL at 06:50

## 2023-04-15 RX ADMIN — CARVEDILOL 3.12 MG: 3.12 TABLET, FILM COATED ORAL at 08:33

## 2023-04-15 RX ADMIN — Medication 10 ML: at 08:33

## 2023-04-15 RX ADMIN — AMLODIPINE BESYLATE 5 MG: 5 TABLET ORAL at 08:33

## 2023-04-15 RX ADMIN — Medication 1000 UNITS: at 08:33

## 2023-04-15 RX ADMIN — ASPIRIN 81 MG: 81 TABLET, COATED ORAL at 08:32

## 2023-04-15 RX ADMIN — PANTOPRAZOLE SODIUM 40 MG: 40 TABLET, DELAYED RELEASE ORAL at 08:32

## 2023-04-15 NOTE — THERAPY TREATMENT NOTE
Acute Care - Physical Therapy Treatment Note  [facility]     Patient Name: Rosaura Salgado  : 1940  MRN: [id number]  Today's Date: 4/15/2023      Visit Dx:     ICD-10-CM ICD-9-CM   1. Dislocation of right hip, initial encounter  S73.004A 835.00   2. Hypokalemia  E87.6 276.8   3. Anemia, unspecified type  D64.9 285.9   4. BOB (acute kidney injury)  N17.9 584.9   5. Essential hypertension  I10 401.9   6. Carotid stenosis, asymptomatic, bilateral  I65.23 433.10     433.30   7. Atherosclerosis of native coronary artery of native heart without angina pectoris  I25.10 414.01   8. Impaired mobility and ADLs  Z74.09 V49.89    Z78.9    9. Impaired functional mobility, balance, gait, and endurance  Z74.09 V49.89     Patient Active Problem List   Diagnosis   • Coronary atherosclerosis of native coronary artery   • Pulmonary embolus   • Pneumonia   • Fibromyalgia   • Mixed hyperlipidemia   • Essential hypertension   • Primary biliary cirrhosis   • Carotid stenosis, asymptomatic, bilateral   • Atherosclerosis of artery of extremity with intermittent claudication   • Traumatic closed displaced fracture of neck of right femur, initial encounter   • Thrombocytopenia   • Closed fracture of neck of right femur, initial encounter   • Moderate malnutrition   • Dislocation of right hip, initial encounter   • Iron deficiency anemia due to chronic blood loss   • Anemia   • CKD (chronic kidney disease) stage 3, GFR 30-59 ml/min   • Hypokalemia   • Hypocalcemia     Past Medical History:   Diagnosis Date   • Allergic rhinitis    • Coronary atherosclerosis of native coronary artery    • Depression    • Fibromyalgia    • GERD (gastroesophageal reflux disease)    • Hyperlipidemia    • Hypertension    • On anticoagulant therapy    • Peptic ulcer    • Peripheral vascular disease, unspecified    • Pneumonia    • Pulmonary embolus      Past Surgical History:   Procedure Laterality Date   • CAROTID ENDARTERECTOMY Left    •  COLONOSCOPY N/A 2/1/2019    Procedure: COLONOSCOPY;  Surgeon: Maurice Carmona DO;  Location: [facility] ENDOSCOPY;  Service: Gastroenterology   • ENDOSCOPY N/A 2/1/2019    Procedure: ESOPHAGOGASTRODUODENOSCOPY;  Surgeon: Maurice Carmona DO;  Location: [facility] ENDOSCOPY;  Service: Gastroenterology   • HIP BIPOLAR REPLACEMENT Right 3/28/2023    Procedure: RIGHT HIP BIPOLAR ANTERIOR APPROACH;  Surgeon: Juan Carlos Jesus MD;  Location: [facility] OR;  Service: Orthopedics;  Laterality: Right;   • KNEE SURGERY       PT Assessment (last 12 hours)     PT Evaluation and Treatment     Row Name 04/15/23 0934          Physical Therapy Time and Intention    Subjective Information no complaints  -TA     Document Type therapy note (daily note)  -TA     Mode of Treatment physical therapy  -TA     Row Name 04/15/23 0934          General Information    Patient Profile Reviewed yes  -TA     Existing Precautions/Restrictions fall;right;hip, posterior;oxygen therapy device and L/min  -TA     Row Name 04/15/23 0934          Pain    Pretreatment Pain Rating 0/10 - no pain  -TA     Posttreatment Pain Rating 0/10 - no pain  -TA     Row Name 04/15/23 0934          Cognition    Orientation Status (Cognition) oriented x 4  -TA     Personal Safety Interventions fall prevention program maintained;gait belt;muscle strengthening facilitated;nonskid shoes/slippers when out of bed;supervised activity  -TA     Row Name 04/15/23 0934          Bed Mobility    Bed Mobility supine-sit;sit-supine  -TA     Supine-Sit [place] (Bed Mobility) contact guard  -TA     Sit-Supine [place] (Bed Mobility) minimum assist (75% patient effort)  -TA     Assistive Device (Bed Mobility) bed rails;head of bed elevated  -TA     Row Name 04/15/23 0934          Transfers    Transfers sit-stand transfer;stand-sit transfer  -TA     Row Name 04/15/23 0934          Sit-Stand Transfer    Sit-Stand [place] (Transfers) contact guard;verbal cues  -TA     Assistive  Device (Sit-Stand Transfers) walker, front-wheeled  -TA     Row Name 04/15/23 0934          Stand-Sit Transfer    Stand-Sit Odd (Transfers) contact guard  -TA     Assistive Device (Stand-Sit Transfers) walker, front-wheeled  -TA     Row Name 04/15/23 0934          Toilet Transfer    Type (Toilet Transfer) sit-stand;stand-sit  -TA     Odd Level (Toilet Transfer) minimum assist (75% patient effort)  -TA     Assistive Device (Toilet Transfer) walker, front-wheeled  -TA     Row Name 04/15/23 0934          Gait/Stairs (Locomotion)    Odd Level (Gait) contact guard  -TA     Assistive Device (Gait) walker, front-wheeled  -TA     Distance in Feet (Gait) 30` x 2  -TA     Row Name 04/15/23 0934          Safety Issues, Functional Mobility    Impairments Affecting Function (Mobility) balance;endurance/activity tolerance;strength  -TA     Row Name 04/15/23 0934          Hip (Therapeutic Exercise)    Hip (Therapeutic Exercise) isometric exercises  -TA     Hip Isometrics (Therapeutic Exercise) bilateral;gluteal sets;supine;10 repetitions  -TA     Row Name 04/15/23 0934          Knee (Therapeutic Exercise)    Knee (Therapeutic Exercise) isometric exercises  -TA     Knee Isometrics (Therapeutic Exercise) bilateral;supine;quad sets;10 repetitions  -TA     Row Name 04/15/23 0934          Ankle (Therapeutic Exercise)    Ankle (Therapeutic Exercise) AROM (active range of motion)  -TA     Ankle AROM (Therapeutic Exercise) bilateral;dorsiflexion;plantarflexion;sitting;2 sets;15 repititions  -TA     Row Name             Wound 03/28/23 1350 sacral spine Pressure Injury    Wound - Properties Group Placement Date: 03/28/23 -EW Placement Time: 1350 -EW Location: sacral spine  -EW Primary Wound Type: Pressure inj  -EW    Retired Wound - Properties Group Placement Date: 03/28/23 -EW Placement Time: 1350 -EW Location: sacral spine  -EW Primary Wound Type: Pressure inj  -EW    Retired Wound - Properties Group Date  first assessed: 03/28/23 -EW Time first assessed: 1350  -EW Location: sacral spine  -EW Primary Wound Type: Pressure inj  -EW    Row Name             Wound 03/28/23 1351 thoracic spine Pressure Injury    Wound - Properties Group Placement Date: 03/28/23 -EW Placement Time: 1351  -EW Present on Hospital Admission: N  -EW Location: thoracic spine  -EW Primary Wound Type: Pressure inj  -EW    Retired Wound - Properties Group Placement Date: 03/28/23 -EW Placement Time: 1351  -EW Present on Hospital Admission: N  -EW Location: thoracic spine  -EW Primary Wound Type: Pressure inj  -EW    Retired Wound - Properties Group Date first assessed: 03/28/23 -EW Time first assessed: 1351  -EW Present on Hospital Admission: N  -EW Location: thoracic spine  -EW Primary Wound Type: Pressure inj  -EW    Row Name             Wound 04/13/23 2213 Left labia Blisters    Wound - Properties Group Placement Date: 04/13/23  -VW Placement Time: 2213  -VW Side: Left  -VW Location: labia  -VW Primary Wound Type: Blisters  -VW    Retired Wound - Properties Group Placement Date: 04/13/23  -VW Placement Time: 2213  -VW Side: Left  -VW Location: labia  -VW Primary Wound Type: Blisters  -VW    Retired Wound - Properties Group Date first assessed: 04/13/23  -VW Time first assessed: 2213  -VW Side: Left  -VW Location: labia  -VW Primary Wound Type: Blisters  -VW    Row Name 04/15/23 0934          Plan of Care Review    Plan of Care Reviewed With patient  -TA     Progress improving  -TA     Outcome Evaluation pt sup<>sit with CGA-min assist of 1, sit<>stand with CGA, pt ambulated 30` x 2 with RW & CGA. pt will require 24/7 care & HHPT  -TA     Row Name 04/15/23 0934          Vital Signs    Pre Systolic BP Rehab 179  -TA     Pre Treatment Diastolic BP 73  -TA     Post Systolic BP Rehab 157  -TA     Post Treatment Diastolic BP 68  -TA     Pretreatment Heart Rate (beats/min) 75  -TA     Posttreatment Heart Rate (beats/min) 81  -TA     Pre SpO2 (%) 96   -TA     O2 Delivery Pre Treatment supplemental O2  -TA     Post SpO2 (%) 95  -TA     O2 Delivery Post Treatment supplemental O2  -TA     Pre Patient Position Supine  -TA     Post Patient Position Supine  -TA     Row Name 04/15/23 0934          Positioning and Restraints    Pre-Treatment Position in bed  -TA     Post Treatment Position bed  -TA     In Bed supine;call light within reach;exit alarm on  -TA     Row Name 04/15/23 0934          Therapy Assessment/Plan (PT)    Rehab Potential (PT) good, to achieve stated therapy goals  -TA     Criteria for Skilled Interventions Met (PT) meets criteria;yes;skilled treatment is necessary  -TA     Therapy Frequency (PT) daily  x2  -TA     Row Name 04/15/23 0934          Bed Mobility Goal 1 (PT)    Activity/Assistive Device (Bed Mobility Goal 1, PT) bed mobility activities, all  -TA     [place] Level/Cues Needed (Bed Mobility Goal 1, PT) contact guard required;standby assist  -TA     Time Frame (Bed Mobility Goal 1, PT) by discharge  -TA     Strategies/Barriers (Bed Mobility Goal 1, PT) maintain R hip precautions  -TA     Row Name 04/15/23 0934          Transfer Goal 1 (PT)    Activity/Assistive Device (Transfer Goal 1, PT) sit-to-stand/stand-to-sit;bed-to-chair/chair-to-bed  -TA     [place] Level/Cues Needed (Transfer Goal 1, PT) contact guard required;standby assist;modified independence  -TA     Time Frame (Transfer Goal 1, PT) by discharge  -TA     Strategies/Barriers (Transfers Goal 1, PT) will maintain R hip dislocation precautions  -TA     Progress/Outcome (Transfer Goal 1, PT) goal not met  -TA     Row Name 04/15/23 0934          Gait Training Goal 1 (PT)    Activity/Assistive Device (Gait Training Goal 1, PT) gait (walking locomotion);assistive device use;decrease fall risk;increase endurance/gait distance  -TA     [place] Level (Gait Training Goal 1, PT) standby assist;modified independence  -TA     Distance (Gait Training Goal 1, PT) 150ft  -TA      Time Frame (Gait Training Goal 1, PT) 5 days  -TA     Strategies/Barriers (Gait Training Goal 1, PT) will maintain R hip dislocation precautions  -TA     Row Name 04/15/23 0934          Stairs Goal 1 (PT)    Activity/Assistive Device (Stairs Goal 1, PT) ascending stairs;descending stairs;using handrail, left;using handrail, right  -TA     [place] Level/Cues Needed (Stairs Goal 1, PT) standby assist;modified independence  -TA     Number of Stairs (Stairs Goal 1, PT) 2  -TA     Time Frame (Stairs Goal 1, PT) by discharge  -TA     Progress/Outcome (Stairs Goal 1, PT) goal not met  -TA           User Key  (r) = Recorded By, (t) = Taken By, (c) = Cosigned By    Initials Name Provider Type    Sara Hurt, RN Registered Nurse    Haritha Tiwari PTA Physical Therapist Assistant    Margie Kirby RN Registered Nurse                Physical Therapy Education     Title: PT OT SLP Therapies (In Progress)     Topic: Physical Therapy (In Progress)     Point: Mobility training (In Progress)     Learning Progress Summary           Patient Acceptance, E, NR by  at 4/14/2023 1421                   Point: Home exercise program (Not Started)     Learner Progress:  Not documented in this visit.          Point: Body mechanics (In Progress)     Learning Progress Summary           Patient Acceptance, E, NR by  at 4/14/2023 1421                   Point: Precautions (In Progress)     Learning Progress Summary           Patient Acceptance, E, NR by  at 4/14/2023 1421                               User Key     Initials Effective Dates Name Provider Type Discipline     06/16/21 -  Lisseth Browning PT Physical Therapist PT              PT Recommendation and Plan  Anticipated Discharge Disposition (PT): home with assist, home with home health, skilled nursing facility  Therapy Frequency (PT): daily (x2)  Plan of Care Reviewed With: patient  Progress: improving  Outcome Evaluation: pt sup<>sit with CGA-min assist of 1,  sit<>stand with CGA, pt ambulated 30` x 2 with RW & CGA. pt will require 24/7 care & HHPT   Outcome Measures     Row Name 04/15/23 1100             How much help from another person do you currently need...    Turning from your back to your side while in flat bed without using bedrails? 3  -TA      Moving from lying on back to sitting on the side of a flat bed without bedrails? 3  -TA      Moving to and from a bed to a chair (including a wheelchair)? 3  -TA      Standing up from a chair using your arms (e.g., wheelchair, bedside chair)? 3  -TA      Climbing 3-5 steps with a railing? 2  -TA      To walk in hospital room? 3  -TA      AM-PAC 6 Clicks Score (PT) 17  -TA         Functional Assessment    Outcome Measure Options AM-PAC 6 Clicks Basic Mobility (PT)  -TA            User Key  (r) = Recorded By, (t) = Taken By, (c) = Cosigned By    Initials Name Provider Type    Haritha iTwari PTA Physical Therapist Assistant                 Time Calculation:    PT Charges     Row Name 04/15/23 1132             Time Calculation    Start Time 0934  -TA      Stop Time 1023  -TA      Time Calculation (min) 49 min  -TA      PT Received On 04/15/23  -TA         Time Calculation- PT    Total Timed Code Minutes- PT 49 minute(s)  -TA         Timed Charges    91244 - PT Therapeutic Exercise Minutes 15  -TA      91633 - Gait Training Minutes  19  -TA      66104 - PT Therapeutic Activity Minutes 15  -TA         Total Minutes    Timed Charges Total Minutes 49  -TA       Total Minutes 49  -TA            User Key  (r) = Recorded By, (t) = Taken By, (c) = Cosigned By    Initials Name Provider Type    Haritha Tiwari PTA Physical Therapist Assistant              Therapy Charges for Today     Code Description Service Date Service Provider Modifiers Qty    90653107205 HC PT THER PROC EA 15 MIN 4/15/2023 Haritha Lara PTA GP 1    78843220741 HC GAIT TRAINING EA 15 MIN 4/15/2023 Haritha Lara PTA GP 1    54917695505 HC PT  THERAPEUTIC ACT EA 15 MIN 4/15/2023 Haritha Lara, TIM GP 1          PT G-Codes  Outcome Measure Options: AM-PAC 6 Clicks Basic Mobility (PT)  AM-PAC 6 Clicks Score (PT): 17  AM-PAC 6 Clicks Score (OT): 16    Haritha Lara PTA  4/15/2023

## 2023-04-15 NOTE — PLAN OF CARE
Goal Outcome Evaluation:              Outcome Evaluation: Blood pressure ran a little zulma, but got better as the night went on. Pt did not complain of pain. Continue to monior.

## 2023-04-15 NOTE — PLAN OF CARE
Goal Outcome Evaluation:  Plan of Care Reviewed With: patient        Progress: improving  Outcome Evaluation: pt sup<>sit with CGA-min assist of 1, sit<>stand with CGA, pt ambulated 30` x 2 with RW & CGA. pt will require 24/7 care & HHPT

## 2023-04-15 NOTE — PROGRESS NOTES
ORTHOPEDIC PROGRESS NOTE:    Name:  Rosaura Salgado  Date:    4/15/2023  Date of admission:  2023    Post op day:  2 Days Post-Op  Procedure:    Procedure(s) (LRB):  CLOSED REDUCTION right hip (Right)    Subjective:  No new complaints  No fever or chills.  Pain is improved.    Vitals:     Vitals:    04/15/23 0355   BP: 155/68   Pulse: 81   Resp: 16   Temp: 97.6 °F (36.4 °C)   SpO2: 95%      Temp (24hrs), Av.2 °F (36.8 °C), Min:97.6 °F (36.4 °C), Max:98.6 °F (37 °C)      Exam:  Awake and alert, very pleasant  Toes up and down  Calves soft and nontender  Good distal pulses and sensation    ASSESSMENT:  Active Hospital Problems    Diagnosis  POA   • **Dislocation of right hip, initial encounter [S73.004A]  Yes   • Iron deficiency anemia due to chronic blood loss [D50.0]  Yes   • Anemia [D64.9]  Yes     Added automatically from request for surgery [id number]     • CKD (chronic kidney disease) stage 3, GFR 30-59 ml/min [N18.30]  Yes   • Hypokalemia [E87.6]  Yes   • Hypocalcemia [E83.51]  Yes   • Carotid stenosis, asymptomatic, bilateral [I65.23]  Yes   • Essential hypertension [I10]  Yes   • Coronary atherosclerosis of native coronary artery [I25.10]  Yes         PLAN:    Status post closed reduction of bipolar endoprosthesis dislocation.  Abduction pillow is in place.  Continue PT and OT to restart mobility, and teach dislocation precautions.  We will continue with use of the abduction pillow while in bed for approximately 6 weeks.  81mg Aspirin BID for dvt prophylaxis x 30 days.  Anticipate discharge today or tomorrow pending mobility and pain control.  followup in ortho in 4 weeks with repeat xrays    04/15/23 at 07:11 CDT by Juan Carlos Jesus MD

## 2023-04-15 NOTE — PLAN OF CARE
Problem: Adult Inpatient Plan of Care  Goal: Plan of Care Review  Recent Flowsheet Documentation  Taken 4/15/2023 1330 by Melia Roldan COTA  Progress: improving  Plan of Care Reviewed With: patient  Outcome Evaluation: Patient supine in bed. Supine to sit<>sit to supine CGA. Pt sat EOB while completeing Grooming with SBA. UB bathing and dressing with SBA. LB bathing with Min A. LB dressing (slippers) SBA. Sit to stand <>stand to sit CGA. Educated patient on Hip precautions. Pt supine in bed all needs in reach.   Goal Outcome Evaluation:  Plan of Care Reviewed With: patient        Progress: improving  Outcome Evaluation: Patient supine in bed. Supine to sit<>sit to supine CGA. Pt sat EOB while completeing Grooming with SBA. UB bathing and dressing with SBA. LB bathing with Min A. LB dressing (slippers) SBA. Sit to stand <>stand to sit CGA. Educated patient on Hip precautions. Pt supine in bed all needs in reach.

## 2023-04-15 NOTE — DISCHARGE SUMMARY
[facility] Medicine Services  DISCHARGE SUMMARY       Date of Admission: 4/13/2023  Date of Discharge:  4/15/2023  Primary Care Physician: Jin Duomnt MD    Presenting Problem/History of Present Illness:  Hypokalemia [E87.6]  Essential hypertension [I10]  BOB (acute kidney injury) [N17.9]  Dislocation of right hip, initial encounter [S73.004A]  Carotid stenosis, asymptomatic, bilateral [I65.23]  Atherosclerosis of native coronary artery of native heart without angina pectoris [I25.10]  Anemia, unspecified type [D64.9]     Final Discharge Diagnoses:  Active Hospital Problems    Diagnosis    • **Dislocation of right hip, initial encounter    • Iron deficiency anemia due to chronic blood loss    • Anemia    • CKD (chronic kidney disease) stage 3, GFR 30-59 ml/min    • Hypokalemia    • Hypocalcemia    • Carotid stenosis, asymptomatic, bilateral    • Essential hypertension    • Coronary atherosclerosis of native coronary artery        Consults:   Consults     Date and Time Order Name Status Description    4/13/2023  7:25 PM Inpatient Orthopedic Surgery Consult      3/27/2023 11:17 AM Orthopedics (on-call MD unless specified) Completed           Procedures Performed: Procedure(s):  CLOSED REDUCTION right hip                Pertinent Test Results:   Lab Results (most recent)     Procedure Component Value Units Date/Time    CBC & Differential [[id number]]  (Abnormal) Collected: 04/15/23 0547    Specimen: Blood Updated: 04/15/23 0622    Narrative:      The following orders were created for panel order CBC & Differential.  Procedure                               Abnormality         Status                     ---------                               -----------         ------                     CBC Auto Differential[[id number]]        Abnormal            Final result                 Please view results for these tests on the individual orders.    CBC Auto  Differential [[id number]]  (Abnormal) Collected: 04/15/23 0547    Specimen: Blood Updated: 04/15/23 0622     WBC 5.73 10*3/mm3      RBC 3.28 10*6/mm3      Hemoglobin 9.3 g/dL      Hematocrit 29.1 %      MCV 88.7 fL      MCH 28.4 pg      MCHC 32.0 g/dL      RDW 16.1 %      RDW-SD 52.3 fl      MPV 9.9 fL      Platelets 105 10*3/mm3      Neutrophil % 68.0 %      Lymphocyte % 23.4 %      Monocyte % 6.8 %      Eosinophil % 1.0 %      Basophil % 0.5 %      Immature Grans % 0.3 %      Neutrophils, Absolute 3.89 10*3/mm3      Lymphocytes, Absolute 1.34 10*3/mm3      Monocytes, Absolute 0.39 10*3/mm3      Eosinophils, Absolute 0.06 10*3/mm3      Basophils, Absolute 0.03 10*3/mm3      Immature Grans, Absolute 0.02 10*3/mm3      nRBC 0.0 /100 WBC     Potassium [[id number]]  (Normal) Collected: 04/14/23 1906    Specimen: Blood Updated: 04/14/23 1925     Potassium 4.4 mmol/L     Hemoglobin & Hematocrit, Blood [[id number]]  (Abnormal) Collected: 04/14/23 1906    Specimen: Blood Updated: 04/14/23 1921     Hemoglobin 10.0 g/dL      Hematocrit 31.2 %     Extra Tubes [[id number]] Collected: 04/14/23 1040    Specimen: Blood, Venous Line Updated: 04/14/23 1146    Narrative:      The following orders were created for panel order Extra Tubes.  Procedure                               Abnormality         Status                     ---------                               -----------         ------                     Green Top (Gel)[[id number]]                                  Final result                 Please view results for these tests on the individual orders.    Green Top (Gel) [[id number]] Collected: 04/14/23 1040    Specimen: Blood Updated: 04/14/23 1146     Extra Tube Hold for add-ons.     Comment: Auto resulted.       Hemoglobin & Hematocrit, Blood [[id number]]  (Abnormal) Collected: 04/14/23 1040    Specimen: Blood Updated: 04/14/23 1057     Hemoglobin 10.5 g/dL      Hematocrit 32.1 %     Comprehensive Metabolic Panel [[id number]]   (Abnormal) Collected: 04/14/23 0816    Specimen: Blood Updated: 04/14/23 0849     Glucose 168 mg/dL      BUN 20 mg/dL      Creatinine 1.59 mg/dL      Sodium 137 mmol/L      Potassium 3.3 mmol/L      Chloride 107 mmol/L      CO2 20.0 mmol/L      Calcium 8.0 mg/dL      Total Protein 6.0 g/dL      Albumin 2.6 g/dL      ALT (SGPT) 28 U/L      AST (SGOT) 30 U/L      Alkaline Phosphatase 437 U/L      Total Bilirubin 2.0 mg/dL      Globulin 3.4 gm/dL      A/G Ratio 0.8 g/dL      BUN/Creatinine Ratio 12.6     Anion Gap 10.0 mmol/L      eGFR 32.5 mL/min/1.73     Narrative:      GFR Normal >60  Chronic Kidney Disease <60  Kidney Failure <15    The GFR formula is only valid for adults with stable renal function between ages 18 and 70.    CBC & Differential [[id number]]  (Abnormal) Collected: 04/14/23 0816    Specimen: Blood Updated: 04/14/23 0831    Narrative:      The following orders were created for panel order CBC & Differential.  Procedure                               Abnormality         Status                     ---------                               -----------         ------                     CBC Auto Differential[[id number]]        Abnormal            Final result                 Please view results for these tests on the individual orders.    CBC Auto Differential [[id number]]  (Abnormal) Collected: 04/14/23 0816    Specimen: Blood Updated: 04/14/23 0831     WBC 8.16 10*3/mm3      RBC 3.64 10*6/mm3      Hemoglobin 10.5 g/dL      Hematocrit 32.0 %      MCV 87.9 fL      MCH 28.8 pg      MCHC 32.8 g/dL      RDW 15.7 %      RDW-SD 50.2 fl      MPV 9.8 fL      Platelets 126 10*3/mm3      Neutrophil % 79.6 %      Lymphocyte % 13.4 %      Monocyte % 5.1 %      Eosinophil % 1.1 %      Basophil % 0.4 %      Immature Grans % 0.4 %      Neutrophils, Absolute 6.50 10*3/mm3      Lymphocytes, Absolute 1.09 10*3/mm3      Monocytes, Absolute 0.42 10*3/mm3      Eosinophils, Absolute 0.09 10*3/mm3      Basophils, Absolute 0.03  10*3/mm3      Immature Grans, Absolute 0.03 10*3/mm3      nRBC 0.0 /100 WBC     Alkaline Phosphatase, Bone Specific [[id number]] Collected: 04/14/23 0816    Specimen: Blood Updated: 04/14/23 0825    Manual Differential [[id number]]  (Abnormal) Collected: 04/13/23 1857    Specimen: Blood from Arm, Right Updated: 04/13/23 1957     Neutrophil % 71.0 %      Lymphocyte % 15.0 %      Monocyte % 5.0 %      Eosinophil % 1.0 %      Bands %  8.0 %      Neutrophils Absolute 3.36 10*3/mm3      Lymphocytes Absolute 0.64 10*3/mm3      Monocytes Absolute 0.21 10*3/mm3      Eosinophils Absolute 0.04 10*3/mm3      Anisocytosis Mod/2+     Hypochromia Mod/2+     WBC Morphology Normal     Platelet Estimate Decreased    Magnesium [[id number]]  (Normal) Collected: 04/13/23 1802    Specimen: Blood Updated: 04/13/23 1937     Magnesium 1.8 mg/dL     Blood Gas, Venous With Co-Ox [[id number]]  (Abnormal) Collected: 04/13/23 1933    Specimen: Venous Blood Updated: 04/13/23 1934     Site OTHER     pH, Venous 7.383 pH Units      pCO2, Venous 38.5 mm Hg      Comment: 84 Value below reference range        pO2, Venous 123.0 mm Hg      Comment: 83 Value above reference range        HCO3, Venous 22.9 mmol/L      Base Excess, Venous -2.0 mmol/L      Comment: 84 Value below reference range        O2 Saturation, Venous 99.8 %      Comment: 83 Value above reference range        Hemoglobin, Blood Gas 6.3 g/dL      Comment: 85 Value below critical limit        Oxyhemoglobin Venous 90.6 %      Comment: 83 Value above reference range        Methemoglobin Venous 0.9 %      Carboxyhemoglobin Venous 8.3 %      Comment: 83 Value above reference range        Sodium, Venous 138 mmol/L      Potassium, Venous 3.0 mmol/L      Comment: 84 Value below reference range        Barometric Pressure for Blood Gas 742 mmHg      Modality Room Air     Ventilator Mode NA     Comment: Meter: [id number]     :  [id number]        Note --    Extra Tubes [[id number]] Collected:  04/13/23 1815    Specimen: Blood, Venous Line Updated: 04/13/23 1930    Narrative:      The following orders were created for panel order Extra Tubes.  Procedure                               Abnormality         Status                     ---------                               -----------         ------                     Gold Top - SST[[id number]]                                   Final result               Light Blue Top[[id number]]                                   Final result                 Please view results for these tests on the individual orders.    Gold Top - SST [[id number]] Collected: 04/13/23 1815    Specimen: Blood Updated: 04/13/23 1930     Extra Tube Hold for add-ons.     Comment: Auto resulted.       Light Blue Top [[id number]] Collected: 04/13/23 1815    Specimen: Blood Updated: 04/13/23 1930     Extra Tube Hold for add-ons.     Comment: Auto resulted       Comprehensive Metabolic Panel [[id number]]  (Abnormal) Collected: 04/13/23 1802    Specimen: Blood Updated: 04/13/23 1835     Glucose 148 mg/dL      BUN 18 mg/dL      Creatinine 1.67 mg/dL      Sodium 138 mmol/L      Potassium 3.0 mmol/L      Comment: Slight hemolysis detected by analyzer. Results may be affected.        Chloride 107 mmol/L      CO2 21.0 mmol/L      Calcium 7.6 mg/dL      Total Protein 5.2 g/dL      Albumin 2.2 g/dL      ALT (SGPT) 27 U/L      AST (SGOT) 35 U/L      Alkaline Phosphatase 393 U/L      Total Bilirubin 0.6 mg/dL      Globulin 3.0 gm/dL      A/G Ratio 0.7 g/dL      BUN/Creatinine Ratio 10.8     Anion Gap 10.0 mmol/L      eGFR 30.7 mL/min/1.73     Narrative:      GFR Normal >60  Chronic Kidney Disease <60  Kidney Failure <15    The GFR formula is only valid for adults with stable renal function between ages 18 and 70.        Imaging Results (Most Recent)     Procedure Component Value Units Date/Time    FL C Arm During Surgery [[id number]] Resulted: 04/14/23 0726     Updated: 04/14/23 0726    XR Hip With or Without  Pelvis 2 - 3 View Right [[id number]] Collected: 04/13/23 1847     Updated: 04/13/23 1946    Narrative:      XR HIP RIGHT W-OR-WO PELVIS 2-3 VIEWS    HISTORY:  Hip dislocation.    FINDINGS:  Right hip hemiarthroplasty which is dislocated and proximally retracted by  approximately 8 cm.  Nonobstructive bowel gas pattern partially obscuring the  sacrum. No clear acute fracture periprosthetic or otherwise.        XR Femur 2 View Right [[id number]] Collected: 04/13/23 1847     Updated: 04/13/23 1946    Narrative:      XR FEMUR 2 VW RIGHT    HISTORY:  Hip pain.    FINDINGS:  No acute osseous fracture.  Soft tissues are grossly unremarkable.  Right hip  dislocation better delineated on dedicated hip radiograph.        XR Chest 1 View [[id number]] Collected: 04/13/23 1847     Updated: 04/13/23 1905    Narrative:      FINDINGS:  Low lung volumes. There is slight elevation of the left hemidiaphragm with left  basilar atelectasis versus infiltrate.   There is mild vascular congestion.      Impression:        1.  Slight elevation of the left hemidiaphragm.  Mild left basilar atelectasis  versus infiltrate.    2.  The heart size is normal.  There is slight central vascular congestion.          Chief Complaint on Day of Discharge: No new complaints    Hospital Course:  The patient is a 82 y.o. female with past medical history notable for CAD, primary biliary cirrhosis, thrombocytopenia, hypertension, and PVD who presented to [facility] with right hip dislocation.  Patient was also found to have anemia requiring transfusion.  Patient recently been in the hospital due to right hip fracture and underwent surgical correction for that.  She was discharged on anticoagulation for that.  Patient denied any overt obvious bleeding but did have some tarry stools initially that cleared up.  Patient was transfused with good response.  Patient was taken to the OR where her hip was reduced by orthopedic surgery.  Patient was  "monitored for signs of bleeding and her hemoglobin remained stable.  Her gastroenterologist was contacted to was agreeable to close outpatient follow-up.  She was seen by therapy and did well who recommended home with 24/7 assistance and home health.  Patient desired discharge home and will follow-up with her PCP, gastroenterologist, and orthopedic surgeon on outpatient basis.  Patient will continue with twice daily baby aspirin moving forward for DVT prophylaxis given her decreased mobility from her recent fracture and dislocation.  Home health orders will also be updated to account for the dislocation.  Patient voiced understanding and agreement to the plan..      Condition on Discharge: Stable    Physical Exam on Discharge:  /74 (BP Location: Right arm, Patient Position: Sitting)   Pulse 82   Temp 97.6 °F (36.4 °C) (Temporal)   Resp 16   Ht 167.6 cm (66\")   Wt 51.9 kg (114 lb 8 oz)   SpO2 98%   BMI 18.48 kg/m²   Physical Exam  Constitutional:       General: She is not in acute distress.     Appearance: She is not toxic-appearing.   HENT:      Head: Normocephalic and atraumatic.      Right Ear: External ear normal.      Left Ear: External ear normal.      Nose: Nose normal.      Mouth/Throat:      Mouth: Mucous membranes are moist.      Pharynx: Oropharynx is clear.   Eyes:      Conjunctiva/sclera: Conjunctivae normal.   Cardiovascular:      Rate and Rhythm: Normal rate and regular rhythm.      Heart sounds: Normal heart sounds.   Pulmonary:      Effort: Pulmonary effort is normal. No respiratory distress.      Breath sounds: Normal breath sounds.   Abdominal:      General: Bowel sounds are normal.      Palpations: Abdomen is soft.      Tenderness: There is no abdominal tenderness.   Musculoskeletal:         General: No deformity.   Skin:     General: Skin is warm and dry.      Capillary Refill: Capillary refill takes less than 2 seconds.   Neurological:      General: No focal deficit present.      " Mental Status: She is alert and oriented to person, place, and time. Mental status is at baseline.   Psychiatric:         Behavior: Behavior normal.       Discharge Disposition:  Home-Health Care [facility] [place]    Discharge Medications:     Discharge Medications      Changes to Medications      Instructions Start Date   aspirin 81 MG EC tablet  What changed: when to take this   81 mg, Oral, 2 Times Daily         Continue These Medications      Instructions Start Date   amLODIPine 5 MG tablet  Commonly known as: NORVASC   5 mg, Oral, Every 24 Hours Scheduled      carvedilol 6.25 MG tablet  Commonly known as: COREG   No dose, route, or frequency recorded.      Enoxaparin Sodium 30 MG/0.3ML solution prefilled syringe syringe  Commonly known as: LOVENOX   Inject 0.3 mL (1 syringe) under the skin into the appropriate area as directed Daily for 28 days.      HYDROcodone-acetaminophen 7.5-325 MG per tablet  Commonly known as: NORCO   1 tablet, Oral, Every 6 Hours PRN      losartan 25 MG tablet  Commonly known as: COZAAR   25 mg, Oral, Every 24 Hours Scheduled      multivitamin with minerals tablet tablet   1 tablet, Oral, Daily      pantoprazole 40 MG EC tablet  Commonly known as: PROTONIX   40 mg, Oral, Daily      potassium gluconate 595 (99 K) MG tablet tablet   595 mg, Oral, Daily      tiZANidine 4 MG tablet  Commonly known as: ZANAFLEX   1 tablet, Oral, Every 8 Hours PRN      tiZANidine 4 MG tablet  Commonly known as: ZANAFLEX   1 tablet, Oral, Nightly PRN      traMADol 50 MG tablet  Commonly known as: ULTRAM   1 tablet, Oral, Every 8 Hours PRN      VITAMIN D PO   1,000 Units, Oral, Daily             Discharge Diet:   Diet Instructions     Diet: Regular/House Diet; Regular Texture (IDDSI 7); Thin (IDDSI 0)      Discharge Diet: Regular/House Diet    Texture: Regular Texture (IDDSI 7)    Fluid Consistency: Thin (IDDSI 0)          Activity at Discharge:   Activity Instructions     Gradually Increase Activity Until at  Pre-Hospitalization Level            Discharge Care Plan/Instructions: Take medications as prescribed, follow-up with PCP, GI, and orthopedic surgery.  Return for worsening symptoms.    Follow-up Appointments:   Future Appointments   Date Time Provider Department Center   4/18/2023 To Be Determined Kendall Pelletier PTA [facility]   4/19/2023 To Be Determined Martina Dyer RN [facility]   4/20/2023  2:20 PM Tabatha Vallecillo APRN [facility]   4/21/2023 To Be Determined Kendall Pelletier PTA [facility]   4/24/2023 To Be Determined Analia Kay, PT [facility]   4/24/2023 To Be Determined Analia Kay, PT [facility]   4/25/2023 To Be Determined Martina Dyer RN [facility]   5/2/2023 To Be Determined Patricia Oconnor RN [facility]       Test Results Pending at Discharge:   Pending Labs     Order Current Status    Alkaline Phosphatase, Bone Specific In process          Marty Gloria MD    Time: 30 minutes

## 2023-04-15 NOTE — PLAN OF CARE
Problem: Fall Injury Risk  Goal: Absence of Fall and Fall-Related Injury  Outcome: Met  Intervention: Promote Injury-Free Environment  Description: Provide a safe, barrier-free environment that encourages independent activity.  Keep care area uncluttered and well-lighted.  Determine need for increased observation or monitoring.  Avoid use of devices that minimize mobility, such as restraints or indwelling urinary catheter.  Recent Flowsheet Documentation  Taken 4/15/2023 1100 by Nalini Berrios RN  Safety Promotion/Fall Prevention: safety round/check completed  Taken 4/15/2023 0900 by Nalini Berrios RN  Safety Promotion/Fall Prevention: safety round/check completed  Taken 4/15/2023 0700 by Nalini Berrios RN  Safety Promotion/Fall Prevention: safety round/check completed     Problem: Hypertension Comorbidity  Goal: Blood Pressure in Desired Range  Outcome: Met     Problem: Electrolyte Imbalance  Goal: Electrolyte Balance  Outcome: Met     Problem: Skin Injury Risk Increased  Goal: Skin Health and Integrity  Outcome: Met  Intervention: Optimize Skin Protection  Description: Perform a full pressure injury risk assessment, as indicated by screening, upon admission to care unit.  Reassess skin (injury risk, full inspection) frequently (e.g., scheduled interval, with change in condition) to provide optimal early detection and prevention.  Maintain adequate tissue perfusion (e.g., encourage fluid balance; avoid crossing legs, constrictive clothing or devices) to promote tissue oxygenation.  Maintain head of bed at lowest degree of elevation tolerated, considering medical condition and other restrictions.  Avoid positioning onto an area that remains reddened.  Minimize incontinence and moisture (e.g., toileting schedule; moisture-wicking pad, diaper or incontinence collection device; skin moisture barrier).  Cleanse skin promptly and gently when soiled utilizing a pH-balanced cleanser.  Relieve and redistribute  pressure (e.g., scheduled position changes, weight shifts, use of support surface, medical device repositioning, protective dressing application, use of positioning device, microclimate control, use of pressure-injury-monitor  Encourage increased activity, such as sitting in a chair at the bedside or early mobilization, when able to tolerate.  Recent Flowsheet Documentation  Taken 4/15/2023 0700 by Nalini Berrios RN  Head of Bed (HOB) Positioning: HOB elevated     Problem: Adult Inpatient Plan of Care  Goal: Plan of Care Review  Outcome: Met  Goal: Patient-Specific Goal (Individualized)  Outcome: Met  Goal: Absence of Hospital-Acquired Illness or Injury  Outcome: Met  Intervention: Identify and Manage Fall Risk  Description: Perform standard risk assessment on admission using a validated tool or comprehensive approach appropriate to the patient; reassess fall risk frequently, with change in status or transfer to another level of care.  Communicate fall injury risk to interprofessional healthcare team.  Determine need for increased observation, equipment and environmental modification, such as low bed, signage and supportive, nonskid footwear.  Adjust safety measures to individual developmental age, stage and identified risk factors.  Reinforce the importance of safety and physical activity with patient and family.  Perform regular intentional rounding to assess need for position change, pain assessment and personal needs, including assistance with toileting.  Recent Flowsheet Documentation  Taken 4/15/2023 1100 by Nalini Berrios RN  Safety Promotion/Fall Prevention: safety round/check completed  Taken 4/15/2023 0900 by Nalini Berrios RN  Safety Promotion/Fall Prevention: safety round/check completed  Taken 4/15/2023 0700 by Nalini Berrios RN  Safety Promotion/Fall Prevention: safety round/check completed  Intervention: Prevent Skin Injury  Description: Perform a screening for skin injury risk, such as  pressure or moisture associated skin damage on admission and at regular intervals throughout hospital stay.  Keep all areas of skin (especially folds) clean and dry.  Maintain adequate skin hydration.  Relieve and redistribute pressure and protect bony prominences; implement measures based on patient-specific risk factors.  Match turning and repositioning schedule to clinical condition.  Encourage weight shift frequently; assist with reposition if unable to complete independently.  Float heels off bed; avoid pressure on the Achilles tendon.  Keep skin free from extended contact with medical devices.  Encourage functional activity and mobility, as early as tolerated.  Use aids (e.g., slide boards, mechanical lift) during transfer.  Recent Flowsheet Documentation  Taken 4/15/2023 1100 by Nalini Berrios RN  Body Position: patient/family refused  Taken 4/15/2023 0900 by Nalini Berrios RN  Body Position: patient/family refused  Taken 4/15/2023 0700 by Nalini Berrios RN  Body Position: supine, legs elevated  Intervention: Prevent and Manage VTE (Venous Thromboembolism) Risk  Description: Assess for VTE (venous thromboembolism) risk.  Encourage and assist with early ambulation.  Initiate and maintain compression or other therapy, as indicated, based on identified risk in accordance with organizational protocol and provider order.  Encourage both active and passive leg exercises while in bed, if unable to ambulate.  Recent Flowsheet Documentation  Taken 4/15/2023 1100 by Nalini Berrios RN  Activity Management: activity encouraged  Taken 4/15/2023 0900 by Nalini Berrios RN  Activity Management: activity encouraged  Taken 4/15/2023 0715 by Nalini Berrios RN  VTE Prevention/Management: (see MAR) other (see comments)  Goal: Optimal Comfort and Wellbeing  Outcome: Met  Goal: Readiness for Transition of Care  Outcome: Met   Goal Outcome Evaluation:

## 2023-04-15 NOTE — THERAPY TREATMENT NOTE
Patient Name: Rosaura Salgado  : 1940    MRN: [id number]                              Today's Date: 4/15/2023       Admit Date: 2023    Visit Dx:     ICD-10-CM ICD-9-CM   1. Dislocation of right hip, initial encounter  S73.004A 835.00   2. Hypokalemia  E87.6 276.8   3. Anemia, unspecified type  D64.9 285.9   4. BOB (acute kidney injury)  N17.9 584.9   5. Essential hypertension  I10 401.9   6. Carotid stenosis, asymptomatic, bilateral  I65.23 433.10     433.30   7. Atherosclerosis of native coronary artery of native heart without angina pectoris  I25.10 414.01   8. Impaired mobility and ADLs  Z74.09 V49.89    Z78.9    9. Impaired functional mobility, balance, gait, and endurance  Z74.09 V49.89     Patient Active Problem List   Diagnosis   • Coronary atherosclerosis of native coronary artery   • Pulmonary embolus   • Pneumonia   • Fibromyalgia   • Mixed hyperlipidemia   • Essential hypertension   • Primary biliary cirrhosis   • Carotid stenosis, asymptomatic, bilateral   • Atherosclerosis of artery of extremity with intermittent claudication   • Traumatic closed displaced fracture of neck of right femur, initial encounter   • Thrombocytopenia   • Closed fracture of neck of right femur, initial encounter   • Moderate malnutrition   • Dislocation of right hip, initial encounter   • Iron deficiency anemia due to chronic blood loss   • Anemia   • CKD (chronic kidney disease) stage 3, GFR 30-59 ml/min   • Hypokalemia   • Hypocalcemia     Past Medical History:   Diagnosis Date   • Allergic rhinitis    • Coronary atherosclerosis of native coronary artery    • Depression    • Fibromyalgia    • GERD (gastroesophageal reflux disease)    • Hyperlipidemia    • Hypertension    • On anticoagulant therapy    • Peptic ulcer    • Peripheral vascular disease, unspecified    • Pneumonia    • Pulmonary embolus      Past Surgical History:   Procedure Laterality Date   • CAROTID ENDARTERECTOMY Left    • COLONOSCOPY N/A  2/1/2019    Procedure: COLONOSCOPY;  Surgeon: Maurice Carmona DO;  Location: [facility] ENDOSCOPY;  Service: Gastroenterology   • ENDOSCOPY N/A 2/1/2019    Procedure: ESOPHAGOGASTRODUODENOSCOPY;  Surgeon: Maurice Carmona DO;  Location: [facility] ENDOSCOPY;  Service: Gastroenterology   • HIP BIPOLAR REPLACEMENT Right 3/28/2023    Procedure: RIGHT HIP BIPOLAR ANTERIOR APPROACH;  Surgeon: Juan Carlos Jesus MD;  Location: [facility] OR;  Service: Orthopedics;  Laterality: Right;   • KNEE SURGERY        General Information     Row Name 04/15/23 1326          OT Time and Intention    Document Type therapy note (daily note)  -     Mode of Treatment individual therapy;occupational therapy  -     Row Name 04/15/23 1326          General Information    Patient Profile Reviewed yes  -LW     Existing Precautions/Restrictions fall;right;hip, posterior;oxygen therapy device and L/min  -     Row Name 04/15/23 1326          Cognition    Orientation Status (Cognition) oriented x 4  -     Row Name 04/15/23 1326          Safety Issues, Functional Mobility    Impairments Affecting Function (Mobility) balance;endurance/activity tolerance;strength  -           User Key  (r) = Recorded By, (t) = Taken By, (c) = Cosigned By    Initials Name Provider Type    LW Melia Roldan COTA Occupational Therapist Assistant                 Mobility/ADL's     Row Name 04/15/23 1327          Bed Mobility    Bed Mobility supine-sit;sit-supine  -LW     Supine-Sit [place] (Bed Mobility) contact guard  -LW     Sit-Supine [place] (Bed Mobility) contact guard  -LW     Assistive Device (Bed Mobility) bed rails;head of bed elevated  -     Row Name 04/15/23 1327          Transfers    Transfers sit-stand transfer;stand-sit transfer  -     Row Name 04/15/23 1327          Sit-Stand Transfer    Sit-Stand [place] (Transfers) contact guard  -     Assistive Device (Sit-Stand Transfers) walker, front-wheeled  -     Row Name 04/15/23  1327          Stand-Sit Transfer    Stand-Sit [place] (Transfers) contact guard  -LW     Assistive Device (Stand-Sit Transfers) walker, front-wheeled  -LW     Row Name 04/15/23 1327          Activities of Daily Living    BADL Assessment/Intervention bathing;upper body dressing;lower body dressing;grooming  -LW     Row Name 04/15/23 1327          Lower Body Dressing Assessment/Training    [place] Level (Lower Body Dressing) lower body dressing skills;doff;don;shoes/slippers;minimum assist (75% patient effort)  -LW     Position (Lower Body Dressing) edge of bed sitting  -LW     Row Name 04/15/23 1327          Bathing Assessment/Intervention    [place] Level (Bathing) bathing skills;lower body;minimum assist (75% patient effort);upper body;standby assist  -LW     Position (Bathing) edge of bed sitting  -LW     Row Name 04/15/23 1327          Upper Body Dressing Assessment/Training    [place] Level (Upper Body Dressing) upper body dressing skills;doff;don;standby assist  Hospital gown  -LW     Position (Upper Body Dressing) edge of bed sitting  -LW     Row Name 04/15/23 1327          Grooming Assessment/Training    [place] Level (Grooming) grooming skills;hair care, combing/brushing;oral care regimen;wash face, hands;standby assist  -LW     Position (Grooming) edge of bed sitting  -LW     Comment, (Grooming) Pan washed hair.  -LW           User Key  (r) = Recorded By, (t) = Taken By, (c) = Cosigned By    Initials Name Provider Type     Melia Roldan COTA Occupational Therapist Assistant               Obj/Interventions    No documentation.                Goals/Plan     Row Name 04/15/23 1050          Transfer Goal 1 (OT)    Activity/Assistive Device (Transfer Goal 1, OT) toilet  -LW     [place] Level/Cues Needed (Transfer Goal 1, OT) standby assist  -LW     Time Frame (Transfer Goal 1, OT) long term goal (LTG);by discharge  -LW     Progress/Outcome (Transfer Goal 1, OT) goal not met   -     Row Name 04/15/23 1050          Bathing Goal 1 (OT)    Activity/Device (Bathing Goal 1, OT) lower body bathing  -LW     [place] Level/Cues Needed (Bathing Goal 1, OT) standby assist  -LW     Time Frame (Bathing Goal 1, OT) long term goal (LTG);by discharge  -LW     Progress/Outcomes (Bathing Goal 1, OT) goal not met  -     Row Name 04/15/23 1050          Dressing Goal 1 (OT)    Activity/Device (Dressing Goal 1, OT) lower body dressing;long-handled shoe horn;reacher;sock-aid  -LW     [place]/Cues Needed (Dressing Goal 1, OT) standby assist  -LW     Time Frame (Dressing Goal 1, OT) long term goal (LTG);by discharge  -LW     Progress/Outcome (Dressing Goal 1, OT) goal not met  -     Row Name 04/15/23 1050          Toileting Goal 1 (OT)    Activity/Device (Toileting Goal 1, OT) toileting skills, all  -LW     [place] Level/Cues Needed (Toileting Goal 1, OT) standby assist  -LW     Time Frame (Toileting Goal 1, OT) long term goal (LTG);by discharge  -LW     Progress/Outcome (Toileting Goal 1, OT) goal not met  -LW           User Key  (r) = Recorded By, (t) = Taken By, (c) = Cosigned By    Initials Name Provider Type    LW Melia Roldan COTA Occupational Therapist Assistant               Clinical Impression     Row Name 04/15/23 1330          Pain Assessment    Pretreatment Pain Rating 0/10 - no pain  -LW     Posttreatment Pain Rating 0/10 - no pain  -LW     Pain Location - Side/Orientation Right  -     Pain Location - hip  -LW     Row Name 04/15/23 1330          Plan of Care Review    Plan of Care Reviewed With patient  -LW     Progress improving  -LW     Outcome Evaluation Patient supine in bed. Supine to sit<>sit to supine CGA. Pt sat EOB while completeing Grooming with SBA. UB bathing and dressing with SBA. LB bathing with Min A. LB dressing (slippers) SBA. Sit to stand <>stand to sit CGA. Educated patient on Hip precautions. Pt supine in bed all needs in reach.  -     Row Name  04/15/23 1330          Positioning and Restraints    Pre-Treatment Position in bed  -LW     Post Treatment Position bed  -LW     In Bed fowlers;call light within reach;encouraged to call for assist;exit alarm on  -LW           User Key  (r) = Recorded By, (t) = Taken By, (c) = Cosigned By    Initials Name Provider Type    Melia Heath COTA Occupational Therapist Assistant               Outcome Measures     Row Name 04/15/23 1335          How much help from another is currently needed...    Putting on and taking off regular lower body clothing? 3  -LW     Bathing (including washing, rinsing, and drying) 3  -LW     Toileting (which includes using toilet bed pan or urinal) 3  -LW     Putting on and taking off regular upper body clothing 3  -LW     Taking care of personal grooming (such as brushing teeth) 3  -LW     Eating meals 4  -LW     AM-PAC 6 Clicks Score (OT) 19  -LW     Row Name 04/15/23 1100 04/15/23 0715       How much help from another person do you currently need...    Turning from your back to your side while in flat bed without using bedrails? 3  -TA 2  -CH    Moving from lying on back to sitting on the side of a flat bed without bedrails? 3  -TA 2  -CH    Moving to and from a bed to a chair (including a wheelchair)? 3  -TA 3  -CH    Standing up from a chair using your arms (e.g., wheelchair, bedside chair)? 3  -TA 3  -CH    Climbing 3-5 steps with a railing? 2  -TA 2  -CH    To walk in hospital room? 3  -TA 3  -CH    AM-PAC 6 Clicks Score (PT) 17  -TA 15  -CH    Highest level of mobility 5 --> Static standing  -TA 4 --> Transferred to chair/commode  -CH    Row Name 04/15/23 1100          Functional Assessment    Outcome Measure Options AM-PAC 6 Clicks Basic Mobility (PT)  -TA           User Key  (r) = Recorded By, (t) = Taken By, (c) = Cosigned By    Initials Name Provider Type    Haritha Tiwari, TIM Physical Therapist Assistant    Melia Heath COTA Occupational Therapist Assistant     Nalini Ivey, RN Registered Nurse                Occupational Therapy Education     Title: PT OT SLP Therapies (Done)     Topic: Occupational Therapy (Done)     Point: ADL training (Done)     Description:   Instruct learner(s) on proper safety adaptation and remediation techniques during self care or transfers.   Instruct in proper use of assistive devices.              Learning Progress Summary           Patient Acceptance, E,TB,D,H, VU by  at 4/15/2023 1337    Comment: Educated patient on Hip precautions.    Acceptance, E,TB, NL,NR by  at 4/14/2023 1313    Comment: POC, role of OT, hip precautions                   Point: Home exercise program (Done)     Description:   Instruct learner(s) on appropriate technique for monitoring, assisting and/or progressing therapeutic exercises/activities.              Learning Progress Summary           Patient Acceptance, E,TB,D,H, VU by  at 4/15/2023 1337    Comment: Educated patient on Hip precautions.                   Point: Precautions (Done)     Description:   Instruct learner(s) on prescribed precautions during self-care and functional transfers.              Learning Progress Summary           Patient Acceptance, E,TB,D,H, VU by  at 4/15/2023 1337    Comment: Educated patient on Hip precautions.                   Point: Body mechanics (Done)     Description:   Instruct learner(s) on proper positioning and spine alignment during self-care, functional mobility activities and/or exercises.              Learning Progress Summary           Patient Acceptance, E,TB,D,H, VU by  at 4/15/2023 1337    Comment: Educated patient on Hip precautions.                               User Key     Initials Effective Dates Name Provider Type Discipline     06/16/21 -  Melia Roldan COTA Occupational Therapist Assistant OT     06/14/21 -  Triny Mcdonald OT Occupational Therapist OT              OT Recommendation and Plan     Plan of Care Review  Plan of Care  Reviewed With: patient  Progress: improving  Outcome Evaluation: Patient supine in bed. Supine to sit<>sit to supine CGA. Pt sat EOB while completeing Grooming with SBA. UB bathing and dressing with SBA. LB bathing with Min A. LB dressing (slippers) SBA. Sit to stand <>stand to sit CGA. Educated patient on Hip precautions. Pt supine in bed all needs in reach.     Time Calculation:    Time Calculation- OT     Row Name 04/15/23 1337 04/15/23 1132          Time Calculation- OT    OT Start Time 1050  -LW --     OT Stop Time 1200  -LW --     OT Time Calculation (min) 70 min  -LW --     Total Timed Code Minutes- OT 70 minute(s)  -LW --     OT Received On 04/15/23  -LW --        Timed Charges    73270 - Gait Training Minutes  -- 19  -TA     32738 - OT Self Care/Mgmt Minutes 70  -LW --        Total Minutes    Timed Charges Total Minutes 70  -LW 19  -TA      Total Minutes 70  -LW 19  -TA           User Key  (r) = Recorded By, (t) = Taken By, (c) = Cosigned By    Initials Name Provider Type    TA Haritha Lara, PTA Physical Therapist Assistant    LW Melia Roldan COTA Occupational Therapist Assistant              Therapy Charges for Today     Code Description Service Date Service Provider Modifiers Qty    [id number] HC OT SELF CARE/MGMT/TRAIN EA 15 MIN 4/15/2023 Melia Roldan COTA GO 5               ROSA Finch  4/15/2023

## 2023-04-16 NOTE — OUTREACH NOTE
Prep Survey    Flowsheet Row Responses   [facility] patient discharged from? [place]   Is LACE score < 7 ? No   Eligibility Readm Mgmt   Discharge diagnosis CLOSED REDUCTION right hip   Does the patient have one of the following disease processes/diagnoses(primary or secondary)? General Surgery   Does the patient have Home health ordered? Yes   What is the Home health agency?  [facility]   Is there a DME ordered? No   Prep survey completed? Yes          Seda SANDOVAL - Registered Nurse

## 2023-04-17 ENCOUNTER — HOME CARE VISIT (OUTPATIENT)
Dept: HOME HEALTH SERVICES | Facility: CLINIC | Age: 83
End: 2023-04-17
Payer: MEDICARE

## 2023-04-17 VITALS
RESPIRATION RATE: 16 BRPM | SYSTOLIC BLOOD PRESSURE: 106 MMHG | OXYGEN SATURATION: 97 % | HEART RATE: 60 BPM | TEMPERATURE: 97.4 F | DIASTOLIC BLOOD PRESSURE: 64 MMHG

## 2023-04-17 LAB — ALP BONE SERPL-MCNC: 49.3 UG/L

## 2023-04-17 PROCEDURE — G0299 HHS/HOSPICE OF RN EA 15 MIN: HCPCS

## 2023-04-19 ENCOUNTER — READMISSION MANAGEMENT (OUTPATIENT)
Dept: CALL CENTER | Facility: HOSPITAL | Age: 83
End: 2023-04-19
Payer: MEDICARE

## 2023-04-19 NOTE — OUTREACH NOTE
General Surgery Week 1 Survey    Flowsheet Row Responses   [facility] patient discharged from? [place]   Does the patient have one of the following disease processes/diagnoses(primary or secondary)? General Surgery   Week 1 attempt successful? Yes   Call start time [phone]   Call end time [phone]   Discharge diagnosis CLOSED REDUCTION right hip   Person spoke with today (if not patient) and relationship Daughter   Meds reviewed with patient/caregiver? Yes   Is the patient having any side effects they believe may be caused by any medication additions or changes? No   Does the patient have all medications related to this admission filled (includes all antibiotics, pain medications, etc.) N/A   Is the patient taking all medications as directed (includes completed medication regime)? Yes   Does the patient have a follow up appointment scheduled with their surgeon? Yes   Has the patient kept scheduled appointments due by today? N/A   Comments 4/25 with surgeon   What is the Home health agency?  [facility]   Has home health visited the patient within 72 hours of discharge? Yes   What DME was ordered? [facility] per [facility]    Has all DME been delivered? Yes   DME comments Daughter is wanting a hospital bed for pt.   Advised to contact her PCP to get orders for this and could send to a supply company.    Psychosocial issues? No   Did the patient receive a copy of their discharge instructions? Yes   Nursing interventions Reviewed instructions with patient   What is the patient's perception of their health status since discharge? Improving   Nursing interventions Nurse provided patient education   Is the patient /caregiver able to teach back basic post-op care? Continue use of incentive spirometry at least 1 week post discharge, Practice 'cough and deep breath', No tub bath, swimming, or hot tub until instructed by MD, Keep incision areas clean,dry and protected, Lifting as instructed by MD in discharge instructions    Is the patient/caregiver able to teach back signs and symptoms of incisional infection? Increased redness, swelling or pain at the incisonal site, Increased drainage or bleeding, Incisional warmth, Pus or odor from incision, Fever   Is the patient/caregiver able to teach back steps to recovery at home? Rest and rebuild strength, gradually increase activity, Set small, achievable goals for return to baseline health, Eat a well-balance diet, Make a list of questions for surgeon's appointment   If the patient is a current smoker, are they able to teach back resources for cessation? Not a smoker   Is the patient/caregiver able to teach back the hierarchy of who to call/visit for symptoms/problems? PCP, Specialist, Home health nurse, Urgent Care, ED, 911 Yes   Additional teach back comments States she has some leg and feet swelling.  She is having her keep them elevated.  She is encouraging her to eat.     Week 1 call completed? Yes   Wrap up additional comments Daughter will contact PCP for orders for a hospital bed.            Argenis SANDOVAL - Licensed Nurse

## 2023-04-20 ENCOUNTER — TELEPHONE (OUTPATIENT)
Dept: ORTHOPEDIC SURGERY | Facility: CLINIC | Age: 83
End: 2023-04-20
Payer: MEDICARE

## 2023-04-20 ENCOUNTER — HOME CARE VISIT (OUTPATIENT)
Dept: HOME HEALTH SERVICES | Facility: CLINIC | Age: 83
End: 2023-04-20
Payer: MEDICARE

## 2023-04-20 ENCOUNTER — HOME CARE VISIT (OUTPATIENT)
Dept: HOME HEALTH SERVICES | Facility: HOME HEALTHCARE | Age: 83
End: 2023-04-20
Payer: MEDICARE

## 2023-04-20 DIAGNOSIS — M25.551 RIGHT HIP PAIN: Primary | ICD-10-CM

## 2023-04-20 DIAGNOSIS — S72.001A CLOSED FRACTURE OF NECK OF RIGHT FEMUR, INITIAL ENCOUNTER: ICD-10-CM

## 2023-04-20 PROCEDURE — G0151 HHCP-SERV OF PT,EA 15 MIN: HCPCS

## 2023-04-20 NOTE — TELEPHONE ENCOUNTER
[facility]    Pt is requesting an order for a hospital bed w/ half rail.    [facility]

## 2023-04-21 VITALS
SYSTOLIC BLOOD PRESSURE: 140 MMHG | DIASTOLIC BLOOD PRESSURE: 74 MMHG | HEART RATE: 68 BPM | OXYGEN SATURATION: 97 % | TEMPERATURE: 97.6 F | RESPIRATION RATE: 18 BRPM

## 2023-04-21 NOTE — HOME HEALTH
PATIENT IS DOING OK. SHE IS HAVING INCREASED SORENESS IN HER HIP. SHE IS REALLY TRYING TO FOLLOW AT THE PRECAUTIONS GIVEN TO HER AND SHE IS TRYING TO WORK ON BEING VERY CAREFUL. SHE WAS ADMITTED BACK TO THE HOSPITAL SECONDARY TO A HIP DISLOCATION AT HOME. SHE FELT LIKE MAYBE SHE CROSSED HER LEGS OR SHE WASN'T TOTALLY SURE AND HER HIP DISLOCATED. SHE WAS TAKEN BY AMBULANCE BACK TO THE HOSPITAL WHERE SHE HAD HER HIP RELOACTED AT THIS TIME. PATIENT THEN WAS DISCHARGED HOME WITH HER DAUGTHER. SHE HAS NOT BEEN UP WALKING AROUND MUCH  SINCE THE HIP RELOCATION AND BASICALLY HAS BEEN USING HER BEDSIDE COMMODE ONLY. PATIENT NOW HAS TOTAL HIP PRECAUTIONS AT THIS TIME     ----TOTAL HIP PRECAUTIONS-----

## 2023-04-24 ENCOUNTER — HOME CARE VISIT (OUTPATIENT)
Dept: HOME HEALTH SERVICES | Facility: CLINIC | Age: 83
End: 2023-04-24
Payer: MEDICARE

## 2023-04-24 PROCEDURE — G0157 HHC PT ASSISTANT EA 15: HCPCS

## 2023-04-25 VITALS
DIASTOLIC BLOOD PRESSURE: 70 MMHG | HEART RATE: 85 BPM | RESPIRATION RATE: 18 BRPM | SYSTOLIC BLOOD PRESSURE: 155 MMHG | TEMPERATURE: 97.2 F | OXYGEN SATURATION: 94 %

## 2023-04-25 NOTE — HOME HEALTH
Patient stated I had a rough night. I'm not eating as well and had some nausea and vomitting. I'm to scared to try some of the exercises.

## 2023-04-26 ENCOUNTER — HOME CARE VISIT (OUTPATIENT)
Dept: HOME HEALTH SERVICES | Facility: CLINIC | Age: 83
End: 2023-04-26
Payer: MEDICARE

## 2023-04-26 VITALS
OXYGEN SATURATION: 98 % | TEMPERATURE: 97.1 F | RESPIRATION RATE: 18 BRPM | SYSTOLIC BLOOD PRESSURE: 122 MMHG | DIASTOLIC BLOOD PRESSURE: 68 MMHG | HEART RATE: 90 BPM

## 2023-04-26 PROCEDURE — G0300 HHS/HOSPICE OF LPN EA 15 MIN: HCPCS

## 2023-04-28 ENCOUNTER — HOME CARE VISIT (OUTPATIENT)
Dept: HOME HEALTH SERVICES | Facility: CLINIC | Age: 83
End: 2023-04-28
Payer: MEDICARE

## 2023-04-29 NOTE — CASE COMMUNICATION
Pt missed a physical therapy appt on 4/28/23 due to patient not feeling well and having an MD appt. Follow-up PT visit scheduled for 5/1/23.

## 2023-05-01 ENCOUNTER — HOME CARE VISIT (OUTPATIENT)
Dept: HOME HEALTH SERVICES | Facility: CLINIC | Age: 83
End: 2023-05-01
Payer: MEDICARE

## 2023-05-02 ENCOUNTER — HOME CARE VISIT (OUTPATIENT)
Dept: HOME HEALTH SERVICES | Facility: CLINIC | Age: 83
End: 2023-05-02
Payer: MEDICARE

## 2023-05-02 ENCOUNTER — HOME CARE VISIT (OUTPATIENT)
Dept: HOME HEALTH SERVICES | Facility: HOME HEALTHCARE | Age: 83
End: 2023-05-02
Payer: MEDICARE

## 2023-05-02 PROCEDURE — G0299 HHS/HOSPICE OF RN EA 15 MIN: HCPCS

## 2023-05-02 NOTE — CASE COMMUNICATION
Pt missed a physical therapy appt on 5/1/23 due to patient refusal. Patient stated she had other obligations this date even after much encouragement.

## 2023-05-03 VITALS
OXYGEN SATURATION: 96 % | DIASTOLIC BLOOD PRESSURE: 88 MMHG | RESPIRATION RATE: 18 BRPM | TEMPERATURE: 98.2 F | SYSTOLIC BLOOD PRESSURE: 140 MMHG | HEART RATE: 82 BPM

## 2023-05-04 ENCOUNTER — HOME CARE VISIT (OUTPATIENT)
Dept: HOME HEALTH SERVICES | Facility: CLINIC | Age: 83
End: 2023-05-04
Payer: MEDICARE

## 2023-05-04 PROCEDURE — G0157 HHC PT ASSISTANT EA 15: HCPCS

## 2023-05-05 VITALS
HEART RATE: 74 BPM | RESPIRATION RATE: 18 BRPM | TEMPERATURE: 45.3 F | SYSTOLIC BLOOD PRESSURE: 132 MMHG | OXYGEN SATURATION: 96 % | DIASTOLIC BLOOD PRESSURE: 70 MMHG

## 2023-05-05 LAB
QT INTERVAL: 464 MS
QTC INTERVAL: 470 MS

## 2023-05-08 ENCOUNTER — HOME CARE VISIT (OUTPATIENT)
Dept: HOME HEALTH SERVICES | Facility: CLINIC | Age: 83
End: 2023-05-08
Payer: MEDICARE

## 2023-05-08 ENCOUNTER — HOME CARE VISIT (OUTPATIENT)
Dept: HOME HEALTH SERVICES | Facility: HOME HEALTHCARE | Age: 83
End: 2023-05-08
Payer: MEDICARE

## 2023-05-08 VITALS
HEART RATE: 68 BPM | DIASTOLIC BLOOD PRESSURE: 70 MMHG | OXYGEN SATURATION: 96 % | RESPIRATION RATE: 16 BRPM | SYSTOLIC BLOOD PRESSURE: 140 MMHG | TEMPERATURE: 98.6 F

## 2023-05-08 PROCEDURE — G0299 HHS/HOSPICE OF RN EA 15 MIN: HCPCS

## 2023-05-11 ENCOUNTER — HOME CARE VISIT (OUTPATIENT)
Dept: HOME HEALTH SERVICES | Facility: CLINIC | Age: 83
End: 2023-05-11
Payer: MEDICARE

## 2023-05-11 PROCEDURE — G0157 HHC PT ASSISTANT EA 15: HCPCS

## 2023-05-11 NOTE — Clinical Note
"Pt has significant increase B LE and abdmoinal edema since last visit. Pt did have a missed visit on Monday/tuesday due to having a stomach bug with vomiting and diarrhea. has not been able to eat more than liqiuds and soft/thick liquids due to throat being sore and feels like has a \"prickly gum ball\" in throat. Reports has been able to urinate regularly, has contiued with diarrhea but only 1 time early this date. Pt has onset of abdominal edema in the past 24 hours and reports was not that big when pregnant. B feet 2+ edema, 1+ edeam B LE past knees into thigh. RN contacted this date, who contacted Sonia Perdomo with Dr. Dumont, pt given the option to see MD at 4:00pm this date, pt declined not having transportation last minute. Pt educated if edema increase, begings to have other S/S or become in any distress to go to ER or urgent care. Pt has appoitment with Yancy Obrien Monday. Pt and her daughter express good understanding and reassure PTA daughter is a RN and able to assess when she needs to go to MD. Pt is in no distress, well alert and oriented. "

## 2023-05-15 VITALS
OXYGEN SATURATION: 97 % | SYSTOLIC BLOOD PRESSURE: 110 MMHG | TEMPERATURE: 97.4 F | DIASTOLIC BLOOD PRESSURE: 56 MMHG | HEART RATE: 88 BPM | RESPIRATION RATE: 18 BRPM

## 2023-05-15 NOTE — HOME HEALTH
HH VISIT TODAY FOR SN DISCIPLINE DISCHARGE DUE TO WOUND HEALING, ALL SN GOALS MET. SN INSTRUCTED PT TO CONTINUE ALL MEDICATIONS AS ORDERED, KEEP ALL MD APPOINTMENTS. PT SERVICES TO CONTINUE WITH THERAPY.

## 2023-05-15 NOTE — HOME HEALTH
"Pt has significant increase B LE and abdmoinal edema since last visit. Pt did have a missed visit on Monday/tuesday due to having a stomach bug with vomiting and diarrhea. has not been able to eat more than liqiuds and soft/thick liquids due to throat being sore and feels like has a \"prickly gum ball\" in throat. Reports has been able to urinate regularly, has contiued with diarrhea but only 1 time early this date. Pt has onset of abdominal edema in the past 24 hours and reports was not that big when pregnant. B feet 2+ edema, 1+ edeam B LE past knees into thigh. RN contacted this date, who contacted Sonia Perdomo with Dr. Dumont, pt given the option to see MD at 4:00pm this date, pt declined not having transportation last minute. Pt educated if edema increase, begings to have other S/S or become in any distress to go to ER or urgent care. Pt has appoitment with Yancy Obrien Monday. Pt and her daughter express good understanding and reassure PTA daughter is a RN and able to assess when she needs to go to MD. Pt is in no distress, well alert and oriented."

## 2023-05-16 ENCOUNTER — HOSPITAL ENCOUNTER (EMERGENCY)
Facility: HOSPITAL | Age: 83
Discharge: HOME | End: 2023-05-16
Attending: EMERGENCY MEDICINE | Admitting: EMERGENCY MEDICINE
Payer: MEDICARE

## 2023-05-16 VITALS
TEMPERATURE: 98 F | OXYGEN SATURATION: 99 % | BODY MASS INDEX: 18.32 KG/M2 | HEART RATE: 79 BPM | HEIGHT: 66 IN | RESPIRATION RATE: 18 BRPM | DIASTOLIC BLOOD PRESSURE: 74 MMHG | WEIGHT: 114 LBS | SYSTOLIC BLOOD PRESSURE: 122 MMHG

## 2023-05-16 DIAGNOSIS — K74.3 PRIMARY BILIARY CIRRHOSIS: ICD-10-CM

## 2023-05-16 DIAGNOSIS — K92.2 GASTROINTESTINAL HEMORRHAGE, UNSPECIFIED GASTROINTESTINAL HEMORRHAGE TYPE: Primary | ICD-10-CM

## 2023-05-16 DIAGNOSIS — D64.9 ANEMIA, UNSPECIFIED TYPE: ICD-10-CM

## 2023-05-16 LAB
ABO GROUP BLD: NORMAL
ALBUMIN SERPL-MCNC: 2.9 G/DL (ref 3.5–5.2)
ALBUMIN/GLOB SERPL: 0.7 G/DL
ALP SERPL-CCNC: 538 U/L (ref 39–117)
ALT SERPL W P-5'-P-CCNC: 34 U/L (ref 1–33)
ANION GAP SERPL CALCULATED.3IONS-SCNC: 13 MMOL/L (ref 5–15)
ANISOCYTOSIS BLD QL: NORMAL
AST SERPL-CCNC: 49 U/L (ref 1–32)
BASOPHILS # BLD AUTO: 0.02 10*3/MM3 (ref 0–0.2)
BASOPHILS NFR BLD AUTO: 0.6 % (ref 0–1.5)
BILIRUB SERPL-MCNC: 0.7 MG/DL (ref 0–1.2)
BILIRUB UR QL STRIP: NEGATIVE
BLD GP AB SCN SERPL QL: NEGATIVE
BUN SERPL-MCNC: 20 MG/DL (ref 8–23)
BUN/CREAT SERPL: 14.9 (ref 7–25)
CALCIUM SPEC-SCNC: 8.4 MG/DL (ref 8.6–10.5)
CHLORIDE SERPL-SCNC: 107 MMOL/L (ref 98–107)
CLARITY UR: CLEAR
CO2 SERPL-SCNC: 19 MMOL/L (ref 22–29)
COLOR UR: YELLOW
CREAT SERPL-MCNC: 1.34 MG/DL (ref 0.57–1)
DEPRECATED RDW RBC AUTO: 50.4 FL (ref 37–54)
EGFRCR SERPLBLD CKD-EPI 2021: 39.9 ML/MIN/1.73
EOSINOPHIL # BLD AUTO: 0.01 10*3/MM3 (ref 0–0.4)
EOSINOPHIL NFR BLD AUTO: 0.3 % (ref 0.3–6.2)
ERYTHROCYTE [DISTWIDTH] IN BLOOD BY AUTOMATED COUNT: 16.2 % (ref 12.3–15.4)
GLOBULIN UR ELPH-MCNC: 4.2 GM/DL
GLUCOSE SERPL-MCNC: 110 MG/DL (ref 65–99)
GLUCOSE UR STRIP-MCNC: NEGATIVE MG/DL
HCT VFR BLD AUTO: 26.8 % (ref 34–46.6)
HGB BLD-MCNC: 8.4 G/DL (ref 12–15.9)
HGB UR QL STRIP.AUTO: NEGATIVE
HYPOCHROMIA BLD QL: NORMAL
IMM GRANULOCYTES # BLD AUTO: 0 10*3/MM3 (ref 0–0.05)
IMM GRANULOCYTES NFR BLD AUTO: 0 % (ref 0–0.5)
INR PPP: 1.14 (ref 0.8–1.2)
KETONES UR QL STRIP: NEGATIVE
LEUKOCYTE ESTERASE UR QL STRIP.AUTO: NEGATIVE
LYMPHOCYTES # BLD AUTO: 1.22 10*3/MM3 (ref 0.7–3.1)
LYMPHOCYTES NFR BLD AUTO: 39.4 % (ref 19.6–45.3)
Lab: NORMAL
MCH RBC QN AUTO: 26.6 PG (ref 26.6–33)
MCHC RBC AUTO-ENTMCNC: 31.3 G/DL (ref 31.5–35.7)
MCV RBC AUTO: 84.8 FL (ref 79–97)
MONOCYTES # BLD AUTO: 0.2 10*3/MM3 (ref 0.1–0.9)
MONOCYTES NFR BLD AUTO: 6.5 % (ref 5–12)
NEUTROPHILS NFR BLD AUTO: 1.65 10*3/MM3 (ref 1.7–7)
NEUTROPHILS NFR BLD AUTO: 53.2 % (ref 42.7–76)
NITRITE UR QL STRIP: NEGATIVE
NRBC BLD AUTO-RTO: 0 /100 WBC (ref 0–0.2)
NT-PROBNP SERPL-MCNC: 831.1 PG/ML (ref 0–1800)
OVALOCYTES BLD QL SMEAR: NORMAL
PH UR STRIP.AUTO: 6 [PH] (ref 5–9)
PLATELET # BLD AUTO: 92 10*3/MM3 (ref 140–450)
PMV BLD AUTO: 9.7 FL (ref 6–12)
POTASSIUM SERPL-SCNC: 3.6 MMOL/L (ref 3.5–5.2)
PROT SERPL-MCNC: 7.1 G/DL (ref 6–8.5)
PROT UR QL STRIP: NEGATIVE
PROTHROMBIN TIME: 14.4 SECONDS (ref 11.1–15.3)
RBC # BLD AUTO: 3.16 10*6/MM3 (ref 3.77–5.28)
RH BLD: POSITIVE
SMALL PLATELETS BLD QL SMEAR: NORMAL
SODIUM SERPL-SCNC: 139 MMOL/L (ref 136–145)
SP GR UR STRIP: 1.01 (ref 1–1.03)
T&S EXPIRATION DATE: NORMAL
UROBILINOGEN UR QL STRIP: NORMAL
WBC MORPH BLD: NORMAL
WBC NRBC COR # BLD: 3.1 10*3/MM3 (ref 3.4–10.8)

## 2023-05-16 PROCEDURE — 85025 COMPLETE CBC W/AUTO DIFF WBC: CPT | Performed by: EMERGENCY MEDICINE

## 2023-05-16 PROCEDURE — 86901 BLOOD TYPING SEROLOGIC RH(D): CPT | Performed by: EMERGENCY MEDICINE

## 2023-05-16 PROCEDURE — 80053 COMPREHEN METABOLIC PANEL: CPT | Performed by: EMERGENCY MEDICINE

## 2023-05-16 PROCEDURE — 99283 EMERGENCY DEPT VISIT LOW MDM: CPT

## 2023-05-16 PROCEDURE — 81003 URINALYSIS AUTO W/O SCOPE: CPT | Performed by: EMERGENCY MEDICINE

## 2023-05-16 PROCEDURE — 96374 THER/PROPH/DIAG INJ IV PUSH: CPT

## 2023-05-16 PROCEDURE — 85007 BL SMEAR W/DIFF WBC COUNT: CPT | Performed by: EMERGENCY MEDICINE

## 2023-05-16 PROCEDURE — 86850 RBC ANTIBODY SCREEN: CPT | Performed by: EMERGENCY MEDICINE

## 2023-05-16 PROCEDURE — 83880 ASSAY OF NATRIURETIC PEPTIDE: CPT | Performed by: EMERGENCY MEDICINE

## 2023-05-16 PROCEDURE — 85610 PROTHROMBIN TIME: CPT | Performed by: EMERGENCY MEDICINE

## 2023-05-16 PROCEDURE — 86900 BLOOD TYPING SEROLOGIC ABO: CPT | Performed by: EMERGENCY MEDICINE

## 2023-05-16 PROCEDURE — 36415 COLL VENOUS BLD VENIPUNCTURE: CPT

## 2023-05-16 RX ORDER — PANTOPRAZOLE SODIUM 40 MG/1
40 TABLET, DELAYED RELEASE ORAL DAILY
Qty: 30 TABLET | Refills: 0 | Status: SHIPPED | OUTPATIENT
Start: 2023-05-16

## 2023-05-16 RX ORDER — SUCRALFATE 1 G/1
1 TABLET ORAL 4 TIMES DAILY
Qty: 120 TABLET | Refills: 0 | Status: SHIPPED | OUTPATIENT
Start: 2023-05-16

## 2023-05-16 RX ORDER — PANTOPRAZOLE SODIUM 40 MG/10ML
40 INJECTION, POWDER, LYOPHILIZED, FOR SOLUTION INTRAVENOUS ONCE
Status: COMPLETED | OUTPATIENT
Start: 2023-05-16 | End: 2023-05-16

## 2023-05-16 RX ADMIN — PANTOPRAZOLE SODIUM 40 MG: 40 INJECTION, POWDER, FOR SOLUTION INTRAVENOUS at 20:04

## 2023-05-16 NOTE — ED PROVIDER NOTES
Subjective   History of Present Illness  This is a 82-year-old female with history of primary biliary cirrhosis and thrombocytopenia who is 2 months status post hip fracture.  The patient states that she is no longer on Lovenox but does take aspirin daily.  For the past several days she has seen dark bowel movements.  Her PCP ordered recent blood work that indicated a hemoglobin in the sixes.  She was referred to the emergency department.  The patient mostly complains of bilateral lower extremity and lower torso swelling.  She denies any bright red blood per rectum.  She denies any hematemesis.  She denies any traumatic injuries.        Review of Systems   All other systems reviewed and are negative.      Past Medical History:   Diagnosis Date   • Allergic rhinitis    • Coronary atherosclerosis of native coronary artery    • Depression    • Fibromyalgia    • GERD (gastroesophageal reflux disease)    • Hyperlipidemia    • Hypertension    • On anticoagulant therapy    • Peptic ulcer    • Peripheral vascular disease, unspecified    • Pneumonia    • Pulmonary embolus        No Known Allergies    Past Surgical History:   Procedure Laterality Date   • CAROTID ENDARTERECTOMY Left    • COLONOSCOPY N/A 2/1/2019    Procedure: COLONOSCOPY;  Surgeon: Maurice Carmona DO;  Location: [facility] ENDOSCOPY;  Service: Gastroenterology   • ENDOSCOPY N/A 2/1/2019    Procedure: ESOPHAGOGASTRODUODENOSCOPY;  Surgeon: Maurice Carmona DO;  Location: [facility] ENDOSCOPY;  Service: Gastroenterology   • HIP BIPOLAR REPLACEMENT Right 3/28/2023    Procedure: RIGHT HIP BIPOLAR ANTERIOR APPROACH;  Surgeon: Juan Carlos Jesus MD;  Location: [facility] OR;  Service: Orthopedics;  Laterality: Right;   • HIP BIPOLAR REPLACEMENT Right 4/13/2023    Procedure: CLOSED REDUCTION right hip;  Surgeon: Juan Carlos Jesus MD;  Location: [facility] OR;  Service: Orthopedics;  Laterality: Right;   • KNEE SURGERY         Family History   Problem Relation Age of  "Onset   • COPD Mother    • Other Father         Dad  from gunshot wound.   • Cancer Other    • Thyroid disease Other        Social History     Socioeconomic History   • Marital status:    Tobacco Use   • Smoking status: Former   • Smokeless tobacco: Never   Vaping Use   • Vaping Use: Never used   Substance and Sexual Activity   • Alcohol use: No   • Drug use: No   • Sexual activity: Defer           Objective   Physical Exam  Vitals and nursing note reviewed.   Constitutional:       Comments: Appears chronically ill   HENT:      Head: Normocephalic and atraumatic.   Eyes:      General: No scleral icterus.  Cardiovascular:      Rate and Rhythm: Normal rate and regular rhythm.      Pulses: Normal pulses.   Pulmonary:      Effort: Pulmonary effort is normal.      Breath sounds: Normal breath sounds.   Genitourinary:     Comments: Dark stool on rectal exam.  Hemoccult positive.  Musculoskeletal:      Right lower leg: Edema present.      Left lower leg: Edema present.   Skin:     General: Skin is warm and dry.      Coloration: Skin is pale.   Neurological:      Mental Status: She is alert and oriented to person, place, and time.   Psychiatric:      Comments: Anxiety and depression         Procedures           ED Course  ED Course as of 05/16/23 2235   Tue May 16, 2023   2046 Comprehensive Metabolic Panel(!)  Abnormal transaminases are chronic [JV]    CBC & Differential(!) [JV]    CBC & Differential(!)  Slowly downtrending hemoglobin [JV]      ED Course User Index  [JV] Jorge Hand, DO                                           Medical Decision Making  The patient's recent past medical charts for this facility as well as outside facilities via the \"care everywhere\" application of epic reviewed.  The patient was admitted and discharged in March of this year after hip fracture.  She was noted to be thrombocytopenic at that time.  History of primary biliary cirrhosis.    The differential diagnosis " includes anemia, thrombocytopenia, liver failure, and GI bleed, among others.    Shared medical decision making with the family and patient and they have initially chosen [facility] as a transfer destination of choice as we do not have GI coverage today at this facility.    On final reevaluation the patient is in stable condition.  We shared medical decision making regarding disposition.  The patient strongly prefers to return home if she does not require a blood transfusion or immediate endoscopy.  We discussed the likelihood that she could continue bleeding.  We discussed discharge instructions and need for follow-up.  We discussed reasons for early return to the emergency department.      Anemia, unspecified type: acute illness or injury  Gastrointestinal hemorrhage, unspecified gastrointestinal hemorrhage type: acute illness or injury  Primary biliary cirrhosis: acute illness or injury  Amount and/or Complexity of Data Reviewed  Independent Historian: caregiver     Details: The patient's daughter provides additional historical details.  Labs: ordered. Decision-making details documented in ED Course.      Risk  OTC drugs.  Prescription drug management.  Decision regarding hospitalization.          Final diagnoses:   Gastrointestinal hemorrhage, unspecified gastrointestinal hemorrhage type   Anemia, unspecified type   Primary biliary cirrhosis       ED Disposition  ED Disposition     ED Disposition   Discharge    Condition   Stable    Comment   --             Jin Dumont MD  [address] [address]  [phone]    Call in 1 day  To make an appointment to be reevaluated for your anemia and blood loss.    Maurice Carmona, DO  [address] [id number] [phone]    Call in 1 day  To make an appointment to be reevaluated after an emergency department visit with positive blood in the stool and anemia    Psychiatric EMERGENCY  DEPARTMENT  [address] [place] [id number] [phone]    As needed, If symptoms worsen at any time or if you are unable to follow-up in the next couple of days.         Medication List      New Prescriptions    sucralfate 1 g tablet  Commonly known as: CARAFATE  Take 1 tablet by mouth 4 (Four) Times a Day.           Where to Get Your Medications      These medications were sent to [facility] [id number] - [address] [place] [address] [place] [address] - [phone]  - [phone] [address] [place], [facility] [address]    Phone: [phone]   · pantoprazole 40 MG EC tablet  · sucralfate 1 g tablet          Jorge Hand DO  05/16/23 9576

## 2023-05-17 ENCOUNTER — HOME CARE VISIT (OUTPATIENT)
Dept: HOME HEALTH SERVICES | Facility: CLINIC | Age: 83
End: 2023-05-17
Payer: MEDICARE

## 2023-05-17 NOTE — DISCHARGE INSTRUCTIONS
Please stop taking all blood thinners.  This includes aspirin and over-the-counter medications such as Motrin.

## 2023-05-18 ENCOUNTER — PREP FOR SURGERY (OUTPATIENT)
Dept: OTHER | Facility: HOSPITAL | Age: 83
End: 2023-05-18
Payer: MEDICARE

## 2023-05-18 DIAGNOSIS — I85.00 ESOPHAGEAL VARICES: Primary | ICD-10-CM

## 2023-05-18 RX ORDER — DEXTROSE AND SODIUM CHLORIDE 5; .45 G/100ML; G/100ML
30 INJECTION, SOLUTION INTRAVENOUS CONTINUOUS PRN
OUTPATIENT
Start: 2023-05-18

## 2023-05-18 RX ORDER — SODIUM CHLORIDE 9 MG/ML
40 INJECTION, SOLUTION INTRAVENOUS AS NEEDED
OUTPATIENT
Start: 2023-05-18

## 2023-05-19 PROBLEM — I85.00 ESOPHAGEAL VARICES: Status: ACTIVE | Noted: 2023-05-19

## 2023-05-23 ENCOUNTER — HOME CARE VISIT (OUTPATIENT)
Dept: HOME HEALTH SERVICES | Facility: CLINIC | Age: 83
End: 2023-05-23
Payer: MEDICARE

## 2023-05-23 VITALS — RESPIRATION RATE: 18 BRPM | DIASTOLIC BLOOD PRESSURE: 42 MMHG | SYSTOLIC BLOOD PRESSURE: 106 MMHG

## 2023-05-23 PROCEDURE — G0151 HHCP-SERV OF PT,EA 15 MIN: HCPCS

## 2023-05-23 NOTE — Clinical Note
PATIENT IS READY FOR DISCHARGE FROM HOME HEALTH SERVICES. SHE REALLY NEEDS MORE THERAPY SERVICES AT THIS TIME BUT SHE IS REQUESTING DISCHARGE AT THIS TIME. SHE FEELS LIKE SHE IS ABLE TO COMPLETE HER EXERCISES ON HER OWN AND WALK AND HER DAUGTHER IS A NURSE AND SHE HELPS HER. SHE DOESN'T WANT ANY FURTHER THERAPY AT THIS TIME OR HOME HEALTH. PATIENT RECEIVED CARE FOR STRENGTH FOR BALANCE, GAIT, TRANSFERS, EDUCATION, PAIN MANAGEMENT AND EDUCATION    PRIOR VS CURRENT LEVEL:  MMT UNABLE TO SLR ON THE RIGHT AT EVAL, [facility]  GAIT: VERY LIMITED WITH A FEW STEPS AT LAST ILIR, MOD I AT DISCHARGE 30 FEET  TRANSERS MIN ASSIST AT EVAL, MOD I DISCHARGE WITH WALKER    DISCAHRGE TO FAMILY CARE  DISCAHRGE REASON PATIENT REQUEST   PATIENT CONDITION GOOD

## 2023-05-24 NOTE — HOME HEALTH
PATIENT STATES THAT THEY WANTED HER TO GO TO THE HOSPITAL AND SHE DID AND THEN THEY SENT HE HOME. THE DAUGTHER STATES THEY WANTED HER TO GO TO [facility] AND SHE REFUSED GOING TO [place] AND SHE THEN SHE CAME HOME. HER DAUGTHER STATES THAT SHE IS DOING BETTER AFTER THEY GAVE HER SOME STUFF FOR HER STOMACH. THE PATIENT SAID SHE WAS THROWING UP BLOOD AND THEY WANTED TO DO A PROCEDURE TO ADDRESS HER THROWING UP BLOOD BUT SHE DIDN'T WANT TO AND NOW IT HAS STOPPED.

## 2023-05-25 ENCOUNTER — ANESTHESIA (OUTPATIENT)
Dept: GASTROENTEROLOGY | Facility: HOSPITAL | Age: 83
End: 2023-05-25
Payer: MEDICARE

## 2023-05-25 ENCOUNTER — HOSPITAL ENCOUNTER (OUTPATIENT)
Facility: HOSPITAL | Age: 83
Setting detail: HOSPITAL OUTPATIENT SURGERY
Discharge: HOME | End: 2023-05-25
Attending: INTERNAL MEDICINE | Admitting: INTERNAL MEDICINE
Payer: MEDICARE

## 2023-05-25 ENCOUNTER — ANESTHESIA EVENT (OUTPATIENT)
Dept: GASTROENTEROLOGY | Facility: HOSPITAL | Age: 83
End: 2023-05-25
Payer: MEDICARE

## 2023-05-25 VITALS
OXYGEN SATURATION: 93 % | SYSTOLIC BLOOD PRESSURE: 90 MMHG | HEIGHT: 65 IN | TEMPERATURE: 97 F | WEIGHT: 114 LBS | DIASTOLIC BLOOD PRESSURE: 43 MMHG | RESPIRATION RATE: 20 BRPM | BODY MASS INDEX: 18.99 KG/M2 | HEART RATE: 74 BPM

## 2023-05-25 DIAGNOSIS — I85.00 ESOPHAGEAL VARICES: ICD-10-CM

## 2023-05-25 PROCEDURE — 25010000002 CEFTRIAXONE PER 250 MG: Performed by: INTERNAL MEDICINE

## 2023-05-25 PROCEDURE — 25010000002 PROPOFOL 10 MG/ML EMULSION: Performed by: NURSE ANESTHETIST, CERTIFIED REGISTERED

## 2023-05-25 RX ORDER — DEXTROSE AND SODIUM CHLORIDE 5; .45 G/100ML; G/100ML
30 INJECTION, SOLUTION INTRAVENOUS CONTINUOUS PRN
Status: DISCONTINUED | OUTPATIENT
Start: 2023-05-25 | End: 2023-05-25 | Stop reason: HOSPADM

## 2023-05-25 RX ORDER — PROPOFOL 10 MG/ML
VIAL (ML) INTRAVENOUS AS NEEDED
Status: DISCONTINUED | OUTPATIENT
Start: 2023-05-25 | End: 2023-05-25 | Stop reason: SURG

## 2023-05-25 RX ORDER — SODIUM CHLORIDE 9 MG/ML
40 INJECTION, SOLUTION INTRAVENOUS AS NEEDED
Status: DISCONTINUED | OUTPATIENT
Start: 2023-05-25 | End: 2023-05-25 | Stop reason: HOSPADM

## 2023-05-25 RX ORDER — LIDOCAINE HYDROCHLORIDE 20 MG/ML
INJECTION, SOLUTION INTRAVENOUS AS NEEDED
Status: DISCONTINUED | OUTPATIENT
Start: 2023-05-25 | End: 2023-05-25 | Stop reason: SURG

## 2023-05-25 RX ORDER — ASPIRIN 81 MG/1
81 TABLET, CHEWABLE ORAL DAILY
COMMUNITY

## 2023-05-25 RX ADMIN — DEXTROSE AND SODIUM CHLORIDE 30 ML/HR: 5; 450 INJECTION, SOLUTION INTRAVENOUS at 15:46

## 2023-05-25 RX ADMIN — PROPOFOL 120 MG: 10 INJECTION, EMULSION INTRAVENOUS at 16:41

## 2023-05-25 RX ADMIN — LIDOCAINE HYDROCHLORIDE 50 MG: 20 INJECTION, SOLUTION INTRAVENOUS at 16:41

## 2023-05-25 NOTE — ANESTHESIA POSTPROCEDURE EVALUATION
Patient: Rosaura Salgado    Procedure Summary     Date: 05/25/23 Room / Location: [facility] ENDOSCOPY 2 / [facility] ENDOSCOPY    Anesthesia Start: 1632 Anesthesia Stop: 1645    Procedure: ESOPHAGOGASTRODUODENOSCOPY 9:00 Diagnosis:       Esophageal varices      (Esophageal varices [I85.00])    Surgeons: Maurice Carmona DO Provider: Joao Crooks CRNA    Anesthesia Type: general ASA Status: 4 - Emergent          Anesthesia Type: general    Vitals  No vitals data found for the desired time range.          Post Anesthesia Care and Evaluation    Patient location during evaluation: PHASE II  Patient participation: waiting for patient participation  Level of consciousness: sleepy but conscious  Pain management: adequate    Airway patency: patent  Anesthetic complications: No anesthetic complications  PONV Status: none  Cardiovascular status: acceptable  Respiratory status: acceptable, spontaneous ventilation and room air  Hydration status: acceptable    Comments: See flow sheet

## 2023-05-25 NOTE — ANESTHESIA PREPROCEDURE EVALUATION
Anesthesia Evaluation     Patient summary reviewed and Nursing notes reviewed   no history of anesthetic complications:  NPO Solid Status: > 8 hours  NPO Liquid Status: > 2 hours           Airway   Mallampati: II  TM distance: >3 FB  Neck ROM: full  possible difficult intubation  Dental    (+) poor dentation and upper dentures    Pulmonary    (+) pulmonary embolism, a smoker Former, COPD moderate, decreased breath sounds,   (-) shortness of breath    PE comment: Albuterol treatment ordered pre-op.  Cardiovascular     ECG reviewed  Rhythm: regular  Rate: normal    (+) hypertension, CAD, cardiac stents (Coronary one stent 2014. Reports no problems since.) PVD, DVT, hyperlipidemia,  carotid artery disease carotid bilateral  (-) angina, MIRELES, murmur, carotid bruits    ROS comment: n-specific st t changes  Abnormal ECG  When compared with ECG of 13-DEC-2018 18:43,  Vent. rate has decreased BY  31 BPM  Nonspecific T wave abnormality, improved in Anterior leads     Referred By:            Confirmed By: GOGO HAAS    Neuro/Psych  (+) psychiatric history Depression,    GI/Hepatic/Renal/Endo    (+)  GERD,      Musculoskeletal     Abdominal    Substance History - negative use     OB/GYN negative ob/gyn ROS         Other   arthritis,      ROS/Med Hx Other: Venous blood gas Hgb 6.3  K+ 3.0                    Anesthesia Plan    ASA 4 - emergent     general   total IV anesthesia  intravenous induction     Anesthetic plan, risks, benefits, and alternatives have been provided, discussed and informed consent has been obtained with: patient.  Pre-procedure education provided  Use of blood products discussed with patient  Consented to blood products.   Plan discussed with CRNA.        CODE STATUS:

## 2023-05-25 NOTE — H&P
Daryn Hanna DO,[facility]  Gastroenterology  Hepatology  Endoscopy  Board Certified in Internal Medicine and gastroenterology  [address], [address]  [place]. [address]  - [phone]   F - [phone]     GASTROENTEROLOGY HISTORY AND PHYSICAL  NOTE   DARYN HANNA DO.         SUBJECTIVE:   5/25/2023    Name: Rosaura Salgado  DOD: 1940        Chief Complaint:     Subjective : Vomiting of blood.  History of varices 2019.  Personal history of primary biliary cirrhosis with ascites    Patient is 82 y.o. female presents with desire for elective EGD with banding of esophageal varices if required.      ROS/HISTORY/ CURRENT MEDICATIONS/OBJECTIVE/VS/PE:   Review of Systems:  All systems unremarkable unless specified below.  Constitutional   HENT  Eyes   Respiratory    Cardiovascular  Gastrointestinal   Endocrine  Genitourinary    Musculoskeletal   Skin  Allergic/Immunologic    Neurological    Hematological  Psychiatric/Behavioral    History:     Past Medical History:   Diagnosis Date   • Allergic rhinitis    • Arthritis    • Coronary atherosclerosis of native coronary artery    • Depression    • Fibromyalgia    • GERD (gastroesophageal reflux disease)    • History of transfusion    • Hyperlipidemia    • Hypertension    • On anticoagulant therapy    • Peptic ulcer    • Peripheral vascular disease, unspecified    • Pneumonia    • Pulmonary embolus      Past Surgical History:   Procedure Laterality Date   • CAROTID ENDARTERECTOMY Left    • COLONOSCOPY N/A 02/01/2019    Procedure: COLONOSCOPY;  Surgeon: Daryn Hanna DO;  Location: [facility] ENDOSCOPY;  Service: Gastroenterology   • ENDOSCOPY N/A 02/01/2019    Procedure: ESOPHAGOGASTRODUODENOSCOPY;  Surgeon: Daryn Hanna DO;  Location: [facility] ENDOSCOPY;  Service: Gastroenterology   • HIP BIPOLAR REPLACEMENT Right 03/28/2023    Procedure: RIGHT HIP BIPOLAR ANTERIOR APPROACH;  Surgeon: Juan Carlos Jesus MD;  Location: [facility] OR;   Service: Orthopedics;  Laterality: Right;   • HIP BIPOLAR REPLACEMENT Right 2023    Procedure: CLOSED REDUCTION right hip;  Surgeon: Juan Carlos Jesus MD;  Location: [facility];  Service: Orthopedics;  Laterality: Right;   • JOINT REPLACEMENT     • KNEE SURGERY       Family History   Problem Relation Age of Onset   • COPD Mother    • Other Father         Dad  from gunshot wound.   • Cancer Other    • Thyroid disease Other      Social History     Tobacco Use   • Smoking status: Every Day     Packs/day: 0.25     Years: 65.00     Pack years: 16.25     Types: Cigarettes   • Smokeless tobacco: Never   • Tobacco comments:     Smokes 1-2 cigarettes per day   Vaping Use   • Vaping Use: Never used   Substance Use Topics   • Alcohol use: No   • Drug use: No     Prior to Admission medications    Medication Sig Start Date End Date Taking? Authorizing Provider   aspirin 81 MG chewable tablet Chew 1 tablet Daily.   Yes Shweta Marr MD   carvedilol (COREG) 6.25 MG tablet Take 1 tablet by mouth 2 (Two) Times a Day With Meals. 23  Yes Shweta Marr MD   Cholecalciferol (VITAMIN D PO) Take 1,000 Units by mouth Daily. Indications: VITAMIN D DEFICIENCY   Yes Shweta Marr MD   furosemide (LASIX) 20 MG tablet Take 1 tablet by mouth 2 (Two) Times a Day. 5/15/23  Yes Shweta Marr MD   HYDROcodone-acetaminophen (NORCO) 7.5-325 MG per tablet Take 1 tablet by mouth Every 6 (Six) Hours As Needed for Moderate Pain or Severe Pain. 23  Yes Shweta Marr MD   Multiple Vitamins-Minerals (MULTIVITAL PO) Take 1 tablet by mouth Daily. Indications: VITAMIN DEFICIENCY   Yes Shweta Marr MD   pantoprazole (PROTONIX) 40 MG EC tablet Take 1 tablet by mouth Daily. 23  Yes Jorge Hand DO   sucralfate (CARAFATE) 1 g tablet Take 1 tablet by mouth 4 (Four) Times a Day. 23  Yes Jorge Hand DO   tiZANidine (ZANAFLEX) 4 MG tablet Take 1 tablet by mouth At Night As Needed  for Muscle Spasms. Indications: Muscle Spasticity, Musculoskeletal Pain   Yes ProviderShweta MD   traMADol (ULTRAM) 50 MG tablet Take 1 tablet by mouth Every 8 (Eight) Hours As Needed for Moderate Pain. Indications: Pain   Yes ProviderShweta MD   losartan (COZAAR) 25 MG tablet Take 1 tablet by mouth Daily for 30 days. 4/1/23 5/1/23  Marty Gloria MD   potassium gluconate 595 (99 K) MG tablet tablet Take 1 tablet by mouth Daily.    Provider, MD Shweta   tiZANidine (ZANAFLEX) 4 MG tablet Take 1 tablet by mouth Every 8 (Eight) Hours As Needed. Indications: Muscle Spasticity, Musculoskeletal Pain 1/10/23   Provider, MD Shweta     Allergies:  Patient has no known allergies.    I have reviewed the patients medical history, surgical history and family history in the available medical record system.     Current Medications:     Current Facility-Administered Medications   Medication Dose Route Frequency Provider Last Rate Last Admin   • cefTRIAXone (ROCEPHIN) 1 g/100 mL 0.9% NS (MBP)  1 g Intravenous Once Maurice Carmona DO       • dextrose 5 % and sodium chloride 0.45 % infusion  30 mL/hr Intravenous Continuous PRN Maurice Carmona DO 30 mL/hr at 05/25/23 1546 30 mL/hr at 05/25/23 1546   • sodium chloride 0.9 % infusion 40 mL  40 mL Intravenous PRN Maurice Carmona DO           Objective     Physical Exam:   Temp:  [97.1 °F (36.2 °C)] 97.1 °F (36.2 °C)  Heart Rate:  [72] 72  Resp:  [18] 18  BP: (114)/(41) 114/41    Physical Exam:  General Appearance:    Alert, cooperative, in no acute distress   Head:    Normocephalic, without obvious abnormality, atraumatic   Eyes:            Lids and lashes normal, conjunctivae and sclerae normal, no icterus, no pallor, corneas clear, PERRLA   Ears:    Ears appear intact with no abnormalities noted   Throat:   No oral lesions, no thrush, oral mucosa moist   Neck:   No adenopathy, supple, trachea midline, no thyromegaly, no  carotid bruit, no JVD   Back:      No kyphosis present, no scoliosis present, no skin lesions,   erythema or scars, no tenderness to percussion or                 palpation,  range of motion normal   Lungs:     Clear to auscultation,respirations regular, even and         unlabored    Heart:    Regular rhythm and normal rate, normal S1 and S2, no  murmur, no gallop, no rub, no click   Breast Exam:    Deferred   Abdomen:     Normal bowel sounds, no masses, no organomegaly, soft  nontender, nondistended, no guarding, no rebound                 tenderness   Genitalia:    Deferred   Extremities:   Moves all extremities well, no edema, no cyanosis, no          redness   Pulses:   Pulses palpable and equal bilaterally   Skin:   No bleeding, bruising or rash   Lymph nodes:   No palpable adenopathy   Neurologic:   Cranial nerves 2 - 12 grossly intact, sensation intact, DTR     present and equal bilaterally      Results Review:     Lab Results   Component Value Date    WBC 3.10 (L) 05/16/2023    WBC 5.73 04/15/2023    WBC 8.16 04/14/2023    HGB 8.4 (L) 05/16/2023    HGB 9.3 (L) 04/15/2023    HGB 10.0 (L) 04/14/2023    HCT 26.8 (L) 05/16/2023    HCT 29.1 (L) 04/15/2023    HCT 31.2 (L) 04/14/2023    PLT 92 (L) 05/16/2023     (L) 04/15/2023     (L) 04/14/2023             No results found for: LIPASE  Lab Results   Component Value Date    INR 1.14 05/16/2023    INR 1.23 (H) 03/27/2023    INR 1.07 11/17/2021     No results found for: RESPCX    Radiology Review:  Imaging Results (Last 72 Hours)     ** No results found for the last 72 hours. **           I reviewed the patient's new clinical results.  I reviewed the patient's new imaging results and agree with the interpretation.     ASSESSMENT/PLAN:   ASSESSMENT:  1.  Esophageal varices with possible bleeding  2.  Cirrhosis secondary to biliary cirrhosis  3.  Ascites, chronic    PLAN:  1.  Esophagogastroduodenoscopy with possible banding of esophageal varices    Risk and benefits associated with the  procedure are reviewed with the patient.  The patient wished to proceed     Maurice Carmona DO  05/25/23  16:25 CDT

## 2023-05-30 LAB — REF LAB TEST METHOD: NORMAL

## 2024-07-31 NOTE — DISCHARGE SUMMARY
[facility] Medicine Services  DISCHARGE SUMMARY       Date of Admission: 3/27/2023  Date of Discharge:  3/31/2023  Primary Care Physician: Jin Dumont MD    Presenting Problem/History of Present Illness:  Hypokalemia [E87.6]  Bradycardia [R00.1]  Thrombocytopenia (HCC) [D69.6]  Closed fracture of neck of right femur, initial encounter ([facility]) [S72.001A]  Leukopenia, unspecified type [D72.819]     Final Discharge Diagnoses:  Active Hospital Problems    Diagnosis    • **Traumatic closed displaced fracture of neck of right femur, initial encounter ([facility])    • Moderate malnutrition (HCC)    • Closed fracture of neck of right femur, initial encounter ([facility])    • Thrombocytopenia (HCC)    • Carotid stenosis, asymptomatic, bilateral    • Essential hypertension    • Primary biliary cirrhosis    • Coronary atherosclerosis of native coronary artery        Consults:   Consults     Date and Time Order Name Status Description    3/27/2023 11:17 AM Orthopedics (on-call MD unless specified) Completed           Procedures Performed: Procedure(s):  RIGHT HIP BIPOLAR ANTERIOR APPROACH                Pertinent Test Results:   Lab Results (most recent)     Procedure Component Value Units Date/Time    Hemoglobin & Hematocrit, Blood [[id number]]  (Abnormal) Collected: 03/31/23 1107    Specimen: Blood Updated: 03/31/23 1132     Hemoglobin 7.0 g/dL      Hematocrit 21.6 %     Extra Tubes [[id number]] Collected: 03/31/23 0845    Specimen: Blood, Venous Line Updated: 03/31/23 1001    Narrative:      The following orders were created for panel order Extra Tubes.  Procedure                               Abnormality         Status                     ---------                               -----------         ------                     Green Top (Gel)[[id number]]                                  Final result                 Please view results for these tests on the individual orders.     Green Top (Gel) [[id number]] Collected: 2345    Specimen: Blood Updated: 23 1001     Extra Tube Hold for add-ons.     Comment: Auto resulted.       CBC & Differential [[id number]]  (Abnormal) Collected: 23    Specimen: Blood Updated: 23    Narrative:      The following orders were created for panel order CBC & Differential.  Procedure                               Abnormality         Status                     ---------                               -----------         ------                     CBC Auto Differential[[id number]]        Abnormal            Final result                 Please view results for these tests on the individual orders.    CBC Auto Differential [[id number]]  (Abnormal) Collected: 23    Specimen: Blood Updated: 23     WBC 9.80 10*3/mm3      RBC 2.36 10*6/mm3      Hemoglobin 7.1 g/dL      Hematocrit 22.3 %      MCV 94.5 fL      MCH 30.1 pg      MCHC 31.8 g/dL      RDW 17.7 %      RDW-SD 59.5 fl      MPV 10.3 fL      Platelets 119 10*3/mm3      Neutrophil % 75.5 %      Lymphocyte % 15.9 %      Monocyte % 7.0 %      Eosinophil % 0.4 %      Basophil % 0.5 %      Immature Grans % 0.7 %      Neutrophils, Absolute 7.39 10*3/mm3      Lymphocytes, Absolute 1.56 10*3/mm3      Monocytes, Absolute 0.69 10*3/mm3      Eosinophils, Absolute 0.04 10*3/mm3      Basophils, Absolute 0.05 10*3/mm3      Immature Grans, Absolute 0.07 10*3/mm3      nRBC 0.0 /100 WBC     TISSUE EXAM, [facility] ([facility]) [[id number]] Collected: 23 1145    Specimen: Bone from Hip, Right Updated: 23 1314     Reference Lab Report --     [facility]  [address] [place] [address]  Phone: [phone] or [phone]  Fax: [phone]  Jareth Rain M.D., Medical Director    PATIENT NAME                           LABORATORY NO.  [id number]  SOPHY VIRAMONTES.                 [id number]  [id number]                         AGE        "       SEX  [facility]           CLIENT REF #  [facility]           82      1940  F    [id number]   [id number]    [place]                       REQUESTING M.D.     ATTENDING M.D.     COPY TO.  [address] [place] [address]                 HEYDI ROLDAN [place],  [place] [address]                                                    PHYLICIA HART DON  DATE COLLECTED      DATE RECEIVED      DATE REPORTED  03/28/2023 03/28/2023 03/30/2023    DIAGNOSIS:  FEMORAL HEAD, FRACTURE, RIGHT:  Fracture site changes  Osteoarthritis  Negative for malignancy    RLL    CLINICAL HISTORY:  Closed fracture of neck of right femur, initial encounter, thrombocytopenia,  carotid stenosis, asymptomatic, bilateral, primary biliary cholangitis, essential  hypertension, atherosclerosis of native coronary artery of native heart without  angina pectoris    SPECIMENS RECEIVED:  FEMORAL HEAD, FRACTURE, RIGHT    MICROSCOPIC DESCRIPTION:  Tissue blocks are prepared and slides are examined microscopically on all  specimens. See diagnosis for details.    Professional interpretation rendered by Jareth Rain M.D., F.C.A.P. at  [facility], [facility], [address] [place] [address].    GROSS DESCRIPTION:  Specimen received in formalin labeled \"right hip femoral head\" and consists of a  4.5 cm in diameter by 3.6 cm in length portion of femoral head having a  roughened hemorrhagic proximal end.  Also received are separate portions of  bone and soft tissue (3.0 x 3.0 x 1.3 cm in aggregate).  Specimen is a total  combined weight of 52 g.  The articular surface is tan and glistening with focal  areas of roughened granularity.  The femoral head is bisected revealing a  hemorrhagic cut surface.  No areas of nodularity or other discrete mass lesions  are grossly identified.  Representative sections are submitted in 2 cassettes after  decalcification.  JLEIGH/RLL    REVIEWED, DIAGNOSED AND " ELECTRONICALLY  SIGNED BY:    Jareth Rain M.D., FMarkCMARIE.  CPT CODES:  55301, 65275      CBC & Differential [[id number]]  (Abnormal) Collected: 03/30/23 0636    Specimen: Blood Updated: 03/30/23 0730    Narrative:      The following orders were created for panel order CBC & Differential.  Procedure                               Abnormality         Status                     ---------                               -----------         ------                     CBC Auto Differential[[id number]]        Abnormal            Final result                 Please view results for these tests on the individual orders.    CBC Auto Differential [[id number]]  (Abnormal) Collected: 03/30/23 0636    Specimen: Blood Updated: 03/30/23 0730     WBC 9.24 10*3/mm3      RBC 3.15 10*6/mm3      Hemoglobin 9.6 g/dL      Hematocrit 28.1 %      MCV 89.2 fL      MCH 30.5 pg      MCHC 34.2 g/dL      RDW 17.2 %      RDW-SD 54.9 fl      MPV 10.2 fL      Platelets 95 10*3/mm3      Neutrophil % 78.1 %      Lymphocyte % 13.3 %      Monocyte % 7.1 %      Eosinophil % 0.2 %      Basophil % 0.3 %      Immature Grans % 1.0 %      Neutrophils, Absolute 7.21 10*3/mm3      Lymphocytes, Absolute 1.23 10*3/mm3      Monocytes, Absolute 0.66 10*3/mm3      Eosinophils, Absolute 0.02 10*3/mm3      Basophils, Absolute 0.03 10*3/mm3      Immature Grans, Absolute 0.09 10*3/mm3      nRBC 0.0 /100 WBC     Comprehensive Metabolic Panel [[id number]]  (Abnormal) Collected: 03/29/23 0747    Specimen: Blood Updated: 03/29/23 0839     Glucose 184 mg/dL      BUN 23 mg/dL      Creatinine 1.28 mg/dL      Sodium 136 mmol/L      Potassium 3.7 mmol/L      Chloride 108 mmol/L      CO2 18.0 mmol/L      Calcium 8.1 mg/dL      Total Protein 6.4 g/dL      Albumin 2.9 g/dL      ALT (SGPT) 18 U/L      AST (SGOT) 36 U/L      Alkaline Phosphatase 311 U/L      Total Bilirubin 1.4 mg/dL      Globulin 3.5 gm/dL      A/G Ratio 0.8 g/dL      BUN/Creatinine Ratio 18.0     Anion Gap 10.0  mmol/L      eGFR 42.2 mL/min/1.73     Narrative:      GFR Normal >60  Chronic Kidney Disease <60  Kidney Failure <15    The GFR formula is only valid for adults with stable renal function between ages 18 and 70.    Comprehensive Metabolic Panel [[id number]]  (Abnormal) Collected: 03/28/23 0548    Specimen: Blood Updated: 03/28/23 0647     Glucose 126 mg/dL      BUN 12 mg/dL      Creatinine 0.98 mg/dL      Sodium 135 mmol/L      Potassium 4.1 mmol/L      Comment: Slight hemolysis detected by analyzer. Results may be affected.        Chloride 107 mmol/L      CO2 15.0 mmol/L      Calcium 8.4 mg/dL      Total Protein 6.8 g/dL      Albumin 2.9 g/dL      ALT (SGPT) 30 U/L      AST (SGOT) 47 U/L      Comment: Slight hemolysis detected by analyzer. Results may be affected.        Alkaline Phosphatase 395 U/L      Total Bilirubin 2.3 mg/dL      Globulin 3.9 gm/dL      A/G Ratio 0.7 g/dL      BUN/Creatinine Ratio 12.2     Anion Gap 13.0 mmol/L      eGFR 58.1 mL/min/1.73     Narrative:      GFR Normal >60  Chronic Kidney Disease <60  Kidney Failure <15    The GFR formula is only valid for adults with stable renal function between ages 18 and 70.    Magnesium [[id number]]  (Abnormal) Collected: 03/28/23 0110    Specimen: Blood Updated: 03/28/23 0129     Magnesium 1.4 mg/dL     Potassium [[id number]]  (Abnormal) Collected: 03/27/23 2113    Specimen: Blood Updated: 03/27/23 2144     Potassium 3.4 mmol/L     Extra Tubes [[id number]] Collected: 03/27/23 1137    Specimen: Blood, Venous Line Updated: 03/27/23 1245    Narrative:      The following orders were created for panel order Extra Tubes.  Procedure                               Abnormality         Status                     ---------                               -----------         ------                     Gold Top - SST[[id number]]                                   Final result               Light Blue Top[[id number]]                                   Final result                  Please view results for these tests on the individual orders.    Gold Top - SST [[id number]] Collected: 03/27/23 1137    Specimen: Blood Updated: 03/27/23 1245     Extra Tube Hold for add-ons.     Comment: Auto resulted.       Light Blue Top [[id number]] Collected: 03/27/23 1137    Specimen: Blood Updated: 03/27/23 1245     Extra Tube Hold for add-ons.     Comment: Auto resulted       Ducor Draw [[id number]] Collected: 03/27/23 0919    Specimen: Blood Updated: 03/27/23 1230    Narrative:      The following orders were created for panel order Ducor Draw.  Procedure                               Abnormality         Status                     ---------                               -----------         ------                     Green Top (Gel)[[id number]]                                  Final result               Lavender Top[[id number]]                                     Final result               Gold Top - SST[[id number]]                                   Final result               Light Blue Top[[id number]]                                   Final result                 Please view results for these tests on the individual orders.    Green Top (Gel) [[id number]] Collected: 03/27/23 1125    Specimen: Blood Updated: 03/27/23 1230     Extra Tube Hold for add-ons.     Comment: Auto resulted.       Ammonia [[id number]]  (Normal) Collected: 03/27/23 1125    Specimen: Blood Updated: 03/27/23 1206     Ammonia 21 umol/L     Single High Sensitivity Troponin T [[id number]]  (Abnormal) Collected: 03/27/23 1125    Specimen: Blood Updated: 03/27/23 1203     HS Troponin T 23 ng/L     Narrative:      High Sensitive Troponin T Reference Range:  <10.0 ng/L- Negative Female for AMI  <15.0 ng/L- Negative Male for AMI  >=10 - Abnormal Female indicating possible myocardial injury.  >=15 - Abnormal Male indicating possible myocardial injury.   Clinicians would have to utilize clinical acumen, EKG, Troponin, and serial changes to determine  if it is an Acute Myocardial Infarction or myocardial injury due to an underlying chronic condition.         Magnesium [[id number]]  (Normal) Collected: 03/27/23 0919    Specimen: Blood Updated: 03/27/23 1034     Magnesium 1.7 mg/dL     Lavender Top [[id number]] Collected: 03/27/23 0919    Specimen: Blood Updated: 03/27/23 1030     Extra Tube hold for add-on     Comment: Auto resulted       Gold Top - SST [[id number]] Collected: 03/27/23 0919    Specimen: Blood Updated: 03/27/23 1030     Extra Tube Hold for add-ons.     Comment: Auto resulted.       Light Blue Top [[id number]] Collected: 03/27/23 0919    Specimen: Blood Updated: 03/27/23 1030     Extra Tube Hold for add-ons.     Comment: Auto resulted       aPTT [[id number]]  (Normal) Collected: 03/27/23 0919    Specimen: Blood Updated: 03/27/23 1028     PTT 32.3 seconds     Narrative:      The recommended Heparin therapeutic range is 68-97 seconds.    Protime-INR [[id number]]  (Abnormal) Collected: 03/27/23 0919    Specimen: Blood Updated: 03/27/23 1028     Protime 15.4 Seconds      INR 1.23    Narrative:      Therapeutic range for most indications is 2.0-3.0 INR,  or 2.5-3.5 for mechanical heart valves.    Scan Slide [[id number]] Collected: 03/27/23 0919    Specimen: Blood Updated: 03/27/23 1026     Anisocytosis Slight/1+     Hypochromia Slight/1+     Ovalocytes Slight/1+     WBC Morphology Normal     Platelet Estimate Decreased    BNP [[id number]]  (Normal) Collected: 03/27/23 0919    Specimen: Blood Updated: 03/27/23 0944     proBNP 1,793.0 pg/mL     Narrative:      Among patients with dyspnea, NT-proBNP is highly sensitive for the detection of acute congestive heart failure. In addition NT-proBNP of <300 pg/ml effectively rules out acute congestive heart failure with 99% negative predictive value.          Imaging Results (Most Recent)     Procedure Component Value Units Date/Time    XR Knee 1 or 2 View Right [[id number]] Collected: 03/30/23 0724     Updated: 03/30/23  0821    Narrative:      PROCEDURE: Right knee x-rays with 2 views.    INDICATION: pain after hip surgery, S72.001A Fracture of  unspecified part of neck of right femur, initial encounter for  closed fracture D72.819 Decreased white blood cell count,  unspecified D69.6 Thrombocytopenia, unspecified E87.6 Hypokalemia  R00.1 Bradycardia, unspecified S72.001A Fracture of unspecified  part of neck of right femur, initial encounter for closed  fracture I65.23 Occlusion and stenosis of bila    No fracture or acute osseous abnormality in the right knee.  Mild narrowing of the medial lateral joint spaces and small  osteophytes along the medial femoral condylar region.    No joint effusion.    Heterogeneous bubbly and linear gas densities in the soft tissues  over the lateral aspect of the mid and distal thigh area. Could  be postoperative however infectious etiologies not excluded.      Impression:      No acute osseous abnormalities in the knee.  Degenerative changes medial and lateral joint space detailed  above.    Bubbly and linear gas densities in the soft tissues over the  lateral aspect mid and distal thigh. Could be postoperative in  etiology however infectious etiology not excluded.    [id number]          Electronically signed by:  Jareth Rodríguez MD  3/30/2023 8:19  AM CDT Workstation: [id number]    XR Femur 2 View Right [[id number]] Collected: 03/30/23 0724     Updated: 03/30/23 0821    Narrative:      PROCEDURE: Right femur x-rays with 2 views.    INDICATION: pain after hip surgery, S72.001A Fracture of  unspecified part of neck of right femur, initial encounter for  closed fracture D72.819 Decreased white blood cell count,  unspecified D69.6 Thrombocytopenia, unspecified E87.6 Hypokalemia  R00.1 Bradycardia, unspecified S72.001A Fracture of unspecified  part of neck of right femur, initial encounter for closed  fracture I65.23 Occlusion and stenosis of bila    COMPARISON: 3/27/2023 right femur and hip  x-rays.    FINDINGS:    There has been interval surgery with hip replacement now present.  The femoral head component is aligned with the acetabular  component. The components normally and seated in respective bony  elements.    Other than the recent hip surgery no acute osseous abnormalities  evident in the femur.    There are significant bubbly and linear gas densities in the soft  tissues over the lateral mid and distal thigh area. Could be  postoperative in nature however cannot exclude infectious  etiology.      Impression:      Right hip replacement normally aligned and normally seated bony  elements.    Significant bubbly and linear gas densities in the lateral mid  and distal thigh soft tissues of uncertain etiology. Could be  postoperative related surgery however cannot exclude underlying  infectious etiology include possibility of necrotizing fasciitis.    Findings personally discussed with Dr. Ann Marie Willett  assistant at 8:15 AM CDT at time of this dictation.      [id number]    Electronically signed by:  Jareth Rodríguez MD  3/30/2023 8:18  AM CDT Workstation: [id number]    FL C Arm During Surgery [[id number]] Resulted: 03/28/23 1337     Updated: 03/28/23 1337    CT Cervical Spine Without Contrast [[id number]] Collected: 03/27/23 1016     Updated: 03/27/23 1124    Narrative:      EXAM: CT cervical spine without contrast  CLINICAL INDICATION: Trauma, injury    COMPARISON: No relevant priors available    TECHNIQUE:    2 mm axial slice thickness images cervical spine  No intravenous contrast was utilized for exam    All CT scans at this facility use dose modulation, iterative  reconstruction, and/or weight based dosing when appropriate to  reduce radiation dose to as low as reasonably achievable.    FINDINGS:    The visualized brain parenchyma is normal.    There is subperiosteal mucosal thickening of the sphenoid  sinuses. The bilateral mastoid air cells well pneumatized.    The neck soft tissues are  symmetric without hematoma, laceration  or subcutaneous gas. There are surgical clips in the region of  left upper neck. There are paraseptal emphysematous changes and  areas of biapical intralobular septal thickening. There is  calcific pleural/parenchymal scarring . No pleural effusion  within the field-of-view.    There is normal cervical lordosis without obvious fracture,  subluxation or aggressive/destructive change. There is no  evidence of traumatic distraction injury. There is multilevel  spondylitic change, endplate sclerosis and multilevel disc space  narrowing. Moderate to severe narrowing at C6-C7.    There is multilevel facet and uncovertebral joint hypertrophy.    There is no prevertebral soft tissue swelling/edema.      Impression:      CONCLUSION:    1. Cervical degenerative changes without fracture.    2. Chronic biapical edema along thickening which may represent  areas of pulmonary edema or atelectasis.    Electronically signed by:  Robert Sow DO  3/27/2023 11:22 AM  CDT Workstation: [id number]    CT Head Without Contrast [[id number]] Collected: 03/27/23 1016     Updated: 03/27/23 1116    Narrative:      EXAM: Head CT without contrast.   CLINICAL INDICATION: Head trauma trauma     COMPARISON: No relevant priors available    TECHNIQUE:      5 mm slice thickness images (vertex to skull base)   No intravenous contrast administered.   Coronal/sagittal reformations were employed.     All CT scans at this facility use dose modulation, iterative  reconstruction, and/or weight based dosing when appropriate to  reduce radiation dose to as low as reasonably achievable.     FINDINGS:      There is mild global parenchymal volume loss.    There is no intraparenchymal or intraventricular hemorrhage.  There is no intra-axial mass or extra-axial fluid collection.   There is no obvious midline shift or mass effect.      There is patchy periventricular hypoattenuation.    The pituitary gland appears normal. There  is evidence of cataract  surgery. There is subperiosteal mucosal thickening of the  sphenoid sinuses. Bilateral mastoid air cells our clear.    There is no calvarial or scalp soft tissue abnormality.      Impression:      CONCLUSION:     1. No acute intracranial abnormality identified.  2. Age-related atrophy and microvascular changes.    Electronically signed by:  Robert Sow DO  3/27/2023 11:14 AM  CDT Workstation: [id number]    XR Femur 2 View Right [[id number]] Collected: 03/27/23 0923     Updated: 03/27/23 1015    Narrative:      PROCEDURE: Right femur x-rays with 2 views.    INDICATION: Trauma. Fall.    COMPARISON: Right hip x-ray same date.    FINDINGS:    Impacted subcapital fracture right hip with cephalad displacement  and varus angulation.    Femur distal to this down to the knee is intact with no  associated fractures distally.      Impression:      Impacted subcapital fracture right hip detailed  above.    Remainder right femur intact.      [id number]    Electronically signed by:  Jareth Rodríguez MD  3/27/2023 10:13  AM CDT Workstation: [id number]    XR Hip With or Without Pelvis 2 - 3 View Right [[id number]] Collected: 03/27/23 0923     Updated: 03/27/23 1013    Narrative:      PROCEDURE: Pelvis and right hip x-rays with 3 views.    INDICATION: Trauma. Fall.    COMPARISON: None.    FINDINGS:    There is a subcapital fracture of the right hip with impaction  and cephalad displacement as well as varus angulation.  Femoral head normally aligned with the acetabulum.    No other fractures evident in the bony pelvis or left hip.  Degenerative arthritic changes in the left hip.    Incidental finding of a 2.9-1.2 cm laminated gallbladder calculi  right mid abdomen.      Impression:      Subcapital right hip fracture with impaction and mild cephalad  displacement with varus angulation.    Couple laminated gallbladder calculi detailed above present on  previous CT abdomen 2018.    [id number]      [id number]    Electronically  signed by:  Jareth Rodríguez MD  3/27/2023 10:11  AM CDT Workstation: [id number]    XR Humerus Left [[id number]] Collected: 03/27/23 0923     Updated: 03/27/23 1009    Narrative:      PROCEDURE: Left humerus x-rays with 2 views.    INDICATION: Trauma. Fall.    COMPARISON: None.    FINDINGS:  No fracture or acute osseous abnormality in the left humerus.  No soft tissue abnormality.  The left shoulder and left elbow also appear to be intact.      Impression:      Negative left humerus.      [id number]    Electronically signed by:  Jareth Rodríguez MD  3/27/2023 10:07  AM CDT Workstation: [id number]    XR Shoulder 2+ View Left [[id number]] Collected: 03/27/23 0922     Updated: 03/27/23 1006    Narrative:      EXAM: XR SHOULDER 2 OR MORE VIEWS LEFT    HISTORY: Left shoulder pain.    COMPARISON: None    FINDINGS:    Mineralization: Osseous demineralization    Bones: No visualized fracture. Degenerative changes at the  acromioclavicular and glenohumeral joint.    Other: Nodular density left midlung      Impression:        No visualized fracture.    Nodular density left midlung. Correlate with dedicated chest  x-ray    Electronically signed by:  Mo Patton DO  3/27/2023 10:04 AM  CDT Workstation: [id number]          Chief Complaint on Day of Discharge: No new complaints    Hospital Course:  The patient is a 82 y.o. female with past medical history notable for CAD, primary biliary cirrhosis, thrombocytopenia, hypertension, and PVD who presented to [facility] with fall resulting in right hip fracture.  Patient was admitted and orthopedic surgery was consulted and she underwent surgical correction.  She did well postoperatively but was noted to have hypertension for which her medications were adjusted.  Due to her biliary cirrhosis she was also noted to have thrombocytopenia but her platelet count improved during her stay.  She was treated for DVT prophylaxis with Lovenox and will continue this as an outpatient.   "She did well with therapy and was cleared for discharge home with home health.  She will follow-up with PCP and orthopedic surgery as an outpatient..      Condition on Discharge: Stable    Physical Exam on Discharge:  /68   Pulse 87   Temp 97.8 °F (36.6 °C) (Temporal)   Resp 18   Ht 165.1 cm (65\")   Wt 54 kg (119 lb)   SpO2 95%   BMI 19.80 kg/m²   Physical Exam  Constitutional:       General: She is not in acute distress.     Appearance: She is not toxic-appearing.   HENT:      Head: Normocephalic and atraumatic.      Right Ear: External ear normal.      Left Ear: External ear normal.      Nose: Nose normal.      Mouth/Throat:      Pharynx: Oropharynx is clear.   Eyes:      Conjunctiva/sclera: Conjunctivae normal.   Cardiovascular:      Rate and Rhythm: Normal rate and regular rhythm.      Heart sounds: Normal heart sounds.   Pulmonary:      Effort: Pulmonary effort is normal. No respiratory distress.      Breath sounds: Normal breath sounds.   Abdominal:      General: Bowel sounds are normal.      Palpations: Abdomen is soft.      Tenderness: There is no abdominal tenderness.   Musculoskeletal:         General: No deformity.   Skin:     General: Skin is warm and dry.      Capillary Refill: Capillary refill takes less than 2 seconds.   Neurological:      General: No focal deficit present.      Mental Status: She is alert and oriented to person, place, and time. Mental status is at baseline.   Psychiatric:         Behavior: Behavior normal.       Discharge Disposition:  Home-Health Care [facility] [place]    Discharge Medications:     Discharge Medications      New Medications      Instructions Start Date   amLODIPine 5 MG tablet  Commonly known as: NORVASC   5 mg, Oral, Every 24 Hours Scheduled   Start Date: April 1, 2023     Enoxaparin Sodium 30 MG/0.3ML solution prefilled syringe syringe  Commonly known as: LOVENOX   Inject 0.3 mL (1 syringe) under the skin into the appropriate area as directed Daily for 28 days.    "   HYDROcodone-acetaminophen 7.5-325 MG per tablet  Commonly known as: NORCO  Replaces: HYDROcodone-acetaminophen 5-325 MG per tablet   1 tablet, Oral, Every 6 Hours PRN      losartan 25 MG tablet  Commonly known as: COZAAR   25 mg, Oral, Every 24 Hours Scheduled   Start Date: April 1, 2023        Continue These Medications      Instructions Start Date   aspirin 81 MG EC tablet   81 mg, Oral, Daily      carvedilol 6.25 MG tablet  Commonly known as: COREG   No dose, route, or frequency recorded.      multivitamin with minerals tablet tablet   1 tablet, Oral, Daily      pantoprazole 40 MG EC tablet  Commonly known as: PROTONIX   40 mg, Oral, Daily      potassium gluconate 595 (99 K) MG tablet tablet   595 mg, Oral, Daily      tiZANidine 4 MG tablet  Commonly known as: ZANAFLEX   1 tablet, Oral, Every 8 Hours PRN      tiZANidine 4 MG tablet  Commonly known as: ZANAFLEX   4 mg, Oral, Nightly PRN      traMADol 50 MG tablet  Commonly known as: ULTRAM   50 mg, Oral, Every 8 Hours PRN      VITAMIN D PO   1,000 Units, Oral, Daily         Stop These Medications    HYDROcodone-acetaminophen 5-325 MG per tablet  Commonly known as: NORCO  Replaced by: HYDROcodone-acetaminophen 7.5-325 MG per tablet            Discharge Diet: Cardiac diet    Activity at Discharge: Gradually increase activity to prehospitalization level    Discharge Care Plan/Instructions: Take medications as prescribed, follow-up with PCP and orthopedic surgery, and return for worsening symptoms.    Follow-up Appointments:   Future Appointments   Date Time Provider Department Center   4/2/2023 To Be Determined Norma Amanda RN [facility] [place]   4/3/2023 To Be Determined Odilia Gonsalez OT [facility] [place]   4/4/2023 To Be Determined Analia Kay, PT [facility] [place]   4/13/2023  9:40 AM Sugey Stone, APRN [facility]       Test Results Pending at Discharge: None    Marty Gloria MD    Time: 32 minutes     [Negative] : Genitourinary

## 2024-08-06 NOTE — ED NOTES
Detail Level: Detailed Nursing report ED to floor  Rosaura Salgado  82 y.o.  female    HPI:   Chief Complaint   Patient presents with    Leg Pain       Admitting doctor:   Get Samano MD    Consulting provider(s):  Consults       Date and Time Order Name Status Description    4/13/2023  7:25 PM Inpatient Orthopedic Surgery Consult      3/27/2023 11:17 AM Orthopedics (on-call MD unless specified) Completed              Admitting diagnosis:   The primary encounter diagnosis was Dislocation of right hip, initial encounter. Diagnoses of Hypokalemia, Anemia, unspecified type, BOB (acute kidney injury), Essential hypertension, Carotid stenosis, asymptomatic, bilateral, and Atherosclerosis of native coronary artery of native heart without angina pectoris were also pertinent to this visit.    Code status:   Current Code Status       Date Active Code Status Order ID Comments User Context       Prior            Allergies:   Patient has no known allergies.    Intake and Output  No intake or output data in the 24 hours ending 04/13/23 1954    Weight:       04/13/23  1716   Weight: 51.7 kg (114 lb)       Most recent vitals:   Vitals:    04/13/23 1841 04/13/23 1902 04/13/23 1905 04/13/23 1951   BP:  129/55     BP Location:       Patient Position:       Pulse: 56 58  58   Resp:   18    Temp:       TempSrc:       SpO2: 90% 95%  94%   Weight:       Height:         Oxygen Therapy: room air    Active LDAs/IV Access:   Lines, Drains & Airways       Active LDAs       Name Placement date Placement time Site Days    Peripheral IV 04/13/23 1802 Right Antecubital 04/13/23 1802  Antecubital  less than 1    Peripheral IV 04/13/23 1945 Left;Posterior Wrist 04/13/23 1945  Wrist  less than 1                    Labs (abnormal labs have a star):   Labs Reviewed   COMPREHENSIVE METABOLIC PANEL - Abnormal; Notable for the following components:       Result Value    Glucose 148 (*)     Creatinine 1.67 (*)     Potassium 3.0 (*)     CO2 21.0 (*)     Calcium 7.6  (*)     Total Protein 5.2 (*)     Albumin 2.2 (*)     AST (SGOT) 35 (*)     Alkaline Phosphatase 393 (*)     eGFR 30.7 (*)     All other components within normal limits    Narrative:     GFR Normal >60  Chronic Kidney Disease <60  Kidney Failure <15    The GFR formula is only valid for adults with stable renal function between ages 18 and 70.   CBC WITH AUTO DIFFERENTIAL - Abnormal; Notable for the following components:    RBC 2.08 (*)     Hemoglobin 5.8 (*)     Hematocrit 18.9 (*)     MCHC 30.7 (*)     RDW 16.2 (*)     RDW-SD 54.4 (*)     Platelets 99 (*)     All other components within normal limits   BLOOD GAS, VENOUS W/CO-OXIMETRY - Abnormal; Notable for the following components:    pH, Venous 7.383 (*)     pCO2, Venous 38.5 (*)     pO2, Venous 123.0 (*)     Base Excess, Venous -2.0 (*)     O2 Saturation, Venous 99.8 (*)     Hemoglobin, Blood Gas 6.3 (*)     Oxyhemoglobin Venous 90.6 (*)     Carboxyhemoglobin Venous 8.3 (*)     Potassium, Venous 3.0 (*)     All other components within normal limits   MAGNESIUM - Normal   MANUAL DIFFERENTIAL   PREPARE RBC   TYPE AND SCREEN   PREVIOUS HISTORY   CBC AND DIFFERENTIAL    Narrative:     The following orders were created for panel order CBC & Differential.  Procedure                               Abnormality         Status                     ---------                               -----------         ------                     CBC Auto Differential[[id number]]        Abnormal            Final result               Scan Slide[[id number]]                                                                    Please view results for these tests on the individual orders.   EXTRA TUBES    Narrative:     The following orders were created for panel order Extra Tubes.  Procedure                               Abnormality         Status                     ---------                               -----------         ------                     Gold Top - SST[[id number]]                    Quality 47: Advance Care Plan: Advance Care Planning discussed and documented; advance care plan or surrogate decision maker documented in the medical record.                 Final result               Light Blue Top[[id number]]                                   Final result                 Please view results for these tests on the individual orders.   GOLD TOP - SST   LIGHT BLUE TOP       Meds given in ED:   Medications   sodium chloride 0.9 % flush 10 mL (has no administration in time range)   sodium chloride 0.9 % infusion (75 mL/hr Intravenous Currently Infusing 4/13/23 1905)   potassium chloride 10 mEq in 100 mL IVPB (10 mEq Intravenous New Bag 4/13/23 1946)   sodium chloride 0.9 % bolus 1,000 mL (1,000 mL Intravenous New Bag 4/13/23 1945)   morphine injection 2 mg (2 mg Intravenous Given 4/13/23 1810)   ondansetron (ZOFRAN) injection 4 mg (4 mg Intravenous Given 4/13/23 1810)     sodium chloride, 75 mL/hr, Last Rate: 75 mL/hr (04/13/23 1905)         NIH Stroke Scale:       Isolation/Infection(s):  No active isolations   No active infections     COVID Testing  Collected no  Resulted no    Nursing report ED to floor:  Mental status: A&O x 4  Ambulatory status: Bedrest  Precautions: Right hip precautions    ED nurse phone [phone]- [phone]    Quality 226: Preventive Care And Screening: Tobacco Use: Screening And Cessation Intervention: Patient screened for tobacco use and is an ex/non-smoker

## (undated) DEVICE — GLV SURG SIGNATURE ESSENTIAL PF LTX SZ8

## (undated) DEVICE — HOOD WITH PEEL AWAY FACE SHIELD: Brand: T7PLUS

## (undated) DEVICE — STERILE POLYISOPRENE POWDER-FREE SURGICAL GLOVES WITH EMOLLIENT COATING: Brand: PROTEXIS

## (undated) DEVICE — CANN SMPL SOFTECH BIFLO ETCO2 A/M 7FT

## (undated) DEVICE — BITEBLOCK ENDO W/STRAP 60F A/ LF DISP

## (undated) DEVICE — GOWN,PREVENTION PLUS,XLONG/XLARGE,STRL: Brand: MEDLINE

## (undated) DEVICE — Device: Brand: DISPOSABLE ELECTROSURGICAL SNARE

## (undated) DEVICE — 6617 IOBAN II PATIENT ISOLATION DRAPE 5/BX,4BX/CS: Brand: STERI-DRAPE™ IOBAN™ 2

## (undated) DEVICE — ADHS SKIN PREMIERPRO EXOFIN TOPICAL HI/VISC .5ML

## (undated) DEVICE — GLV SURG SENSICARE PI ORTHO SZ8 LF STRL

## (undated) DEVICE — BNDG ELAS CO-FLEX SLF ADHR 4IN5YD LF STRL

## (undated) DEVICE — TRAP SXN POLYP QUICKCATCH LF

## (undated) DEVICE — DUAL CUT SAGITTAL BLADE

## (undated) DEVICE — GLV SURG SENSICARE PI LF PF 8 GRN STRL

## (undated) DEVICE — SUT VIC 0 CT1 36IN J946H

## (undated) DEVICE — SINGLE-USE BIOPSY FORCEPS: Brand: RADIAL JAW 4

## (undated) DEVICE — PATIENT RETURN ELECTRODE, SINGLE-USE, CONTACT QUALITY MONITORING, ADULT, WITH 9FT CORD, FOR PATIENTS WEIGING OVER 33LBS. (15KG): Brand: MEGADYNE

## (undated) DEVICE — GLV SURG SIGNATURE ESSENTIAL PF LTX SZ7

## (undated) DEVICE — TBG PENCL TELESCP MEGADYNE SMOKE EVAC 10FT